# Patient Record
Sex: FEMALE | Race: WHITE
[De-identification: names, ages, dates, MRNs, and addresses within clinical notes are randomized per-mention and may not be internally consistent; named-entity substitution may affect disease eponyms.]

---

## 2017-04-12 ENCOUNTER — HOSPITAL ENCOUNTER (EMERGENCY)
Dept: HOSPITAL 83 - ED | Age: 22
Discharge: HOME | End: 2017-04-12
Payer: COMMERCIAL

## 2017-04-12 VITALS — WEIGHT: 207 LBS | HEIGHT: 65 IN | BODY MASS INDEX: 34.49 KG/M2

## 2017-04-12 DIAGNOSIS — D68.51: ICD-10-CM

## 2017-04-12 DIAGNOSIS — Z3A.30: ICD-10-CM

## 2017-04-12 DIAGNOSIS — O99.113: ICD-10-CM

## 2017-04-12 DIAGNOSIS — O23.43: Primary | ICD-10-CM

## 2017-04-12 LAB
ALBUMIN SERPL-MCNC: NEGATIVE G/DL
APPEARANCE UR: (no result)
BACTERIA #/AREA URNS HPF: (no result) /[HPF]
BILIRUB UR QL STRIP: NEGATIVE
COLOR UR: YELLOW
EPI CELLS #/AREA URNS HPF: (no result) /[HPF]
GLUCOSE UR QL: NEGATIVE
HGB UR QL STRIP: (no result)
KETONES UR QL STRIP: NEGATIVE
LEUKOCYTE ESTERASE UR QL STRIP: (no result)
NITRITE UR QL STRIP: NEGATIVE
PH UR STRIP: 8 [PH] (ref 5–9)
RBC #/AREA URNS HPF: (no result) RBC/HPF (ref 0–2)
SP GR UR: 1.01 (ref 1–1.03)
URINE REFLEX COMMENT: YES
UROBILINOGEN UR STRIP-MCNC: 0.2 E.U./DL (ref 0.2–1)
WBC #/AREA URNS HPF: (no result) WBC/HPF (ref 0–5)

## 2018-11-14 ENCOUNTER — HOSPITAL ENCOUNTER (EMERGENCY)
Dept: HOSPITAL 83 - ED | Age: 23
Discharge: HOME | End: 2018-11-14
Payer: COMMERCIAL

## 2018-11-14 VITALS — HEIGHT: 55 IN | WEIGHT: 220 LBS
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
## 2018-11-14 DIAGNOSIS — S93.401A: Primary | ICD-10-CM
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
## 2021-02-23 ENCOUNTER — ROUTINE PRENATAL (OUTPATIENT)
Dept: OBGYN CLINIC | Age: 26
End: 2021-02-23
Payer: COMMERCIAL

## 2021-02-23 VITALS
BODY MASS INDEX: 39.61 KG/M2 | TEMPERATURE: 97.9 F | DIASTOLIC BLOOD PRESSURE: 86 MMHG | HEART RATE: 101 BPM | SYSTOLIC BLOOD PRESSURE: 127 MMHG | HEIGHT: 64 IN | WEIGHT: 232 LBS

## 2021-02-23 DIAGNOSIS — D68.9 BLOOD COAGULATION DISORDER COMPLICATING PREGNANCY, FIRST TRIMESTER (HCC): ICD-10-CM

## 2021-02-23 DIAGNOSIS — Z3A.12 12 WEEKS GESTATION OF PREGNANCY: ICD-10-CM

## 2021-02-23 DIAGNOSIS — O99.111 BLOOD COAGULATION DISORDER COMPLICATING PREGNANCY, FIRST TRIMESTER (HCC): ICD-10-CM

## 2021-02-23 DIAGNOSIS — Z13.79 ENCOUNTER FOR OTHER SCREENING FOR GENETIC AND CHROMOSOMAL ANOMALIES: ICD-10-CM

## 2021-02-23 DIAGNOSIS — O30.041 TWIN PREGNANCY, DICHORIONIC/DIAMNIOTIC, FIRST TRIMESTER: Primary | ICD-10-CM

## 2021-02-23 DIAGNOSIS — O99.210 OBESITY AFFECTING PREGNANCY, ANTEPARTUM: ICD-10-CM

## 2021-02-23 DIAGNOSIS — O35.2XX0 HEREDITARY FAMILIAL DISEASE AFFECTING MANAGEMENT OF MOTHER AND POSSIBLY AFFECTING FETUS, ANTEPARTUM, SINGLE OR UNSPECIFIED FETUS: ICD-10-CM

## 2021-02-23 LAB
GLUCOSE URINE, POC: NORMAL
PROTEIN UA: NEGATIVE

## 2021-02-23 PROCEDURE — G8427 DOCREV CUR MEDS BY ELIG CLIN: HCPCS | Performed by: OBSTETRICS & GYNECOLOGY

## 2021-02-23 PROCEDURE — 99242 OFF/OP CONSLTJ NEW/EST SF 20: CPT | Performed by: OBSTETRICS & GYNECOLOGY

## 2021-02-23 PROCEDURE — G8419 CALC BMI OUT NRM PARAM NOF/U: HCPCS | Performed by: OBSTETRICS & GYNECOLOGY

## 2021-02-23 PROCEDURE — 99203 OFFICE O/P NEW LOW 30 MIN: CPT | Performed by: OBSTETRICS & GYNECOLOGY

## 2021-02-23 PROCEDURE — 81002 URINALYSIS NONAUTO W/O SCOPE: CPT | Performed by: OBSTETRICS & GYNECOLOGY

## 2021-02-23 PROCEDURE — G8484 FLU IMMUNIZE NO ADMIN: HCPCS | Performed by: OBSTETRICS & GYNECOLOGY

## 2021-02-23 NOTE — LETTER
tremors. No history of seizures    FAMILY MEDICAL HISTORY:   Negative for chromosomal anomalies and Mental retardation. Positive for:  ? Skeletal dysplasia (two of her siblings born at term  at birth). ? Thrombophilia (her mother had a DVT and pulmonary embolus following a  delivery. OB Genetic Screening    Patient's Age 35+ at Date of Delivery No     Thalassemia MCV<80 No     Neural Tube Defect No     Congenital Heart Defect No     Down Syndrome No     Tal-Sachs No     Sickle Cell Disease or Trait No     Hemophilia No     Muscular Dystrophy No     Cystic Fibrosis No     Freeman Chorea No     Mental Retardation/Autism No     Was Person Treated for Fragilex? No     Other Inherited Genetic Chromosomal Disorder? Yes MOB sibling was born with short-limbed dwarfism with bilateral pulmonary hypoplasia and renal medullary dysplasia.  Maternal Metabolic Disorder No     Patient or [de-identified] Father Had Other Defects? No     Recurrent Pregnancy Loss or Still Birth? No        OB Infection History    Blood Type A Postivie      High Risk Hepatitis B/Immunized? Yes     Live with Someone with or Exposed to TB? No     Patient or Partner has Hx of Genital Herpes? No     Rash or Viral Illness Since LMP? No     History of STD/GC/Chlamydia/HPV/Syphilis? No        Mrs Santi Fregoso had an uneventful course of pregnancy so far. When seen today in our office she had no complaints. PHYSICAL EXAMINATION:    General Appearance:  Healthy looking, alert, no acute distress. Eyes:     No pallor, no icterus, no photophobia. Ears:     No ear drainage. Nose:     No nasal drainage, no paranasal sinus tenderness. Throat:   Mucosa moist, no oral thrush, no exudate. Neck:     No nuchal rigidity. Back:     No CVA tenderness. Abdomen:    Soft nontender. Extremities:    No pretibial pitting edema, no calf muscle tenderness. Skin:     No rashes, no lesions.      BP: 127/86 Weight: 232 lb (105.2 kg) Height: 5' 4\" (162.6 cm) Pulse: 101     Body mass index is 39.82 kg/m². Urine dipstick:  Glucose : Negative   Albumin:  Negative       An ultrasound evaluation was done in our office today. Please refer to the enclosed copy of the ultrasound report for further information. Prenatal chart and Lab Work Review:                      IMPRESSION:    1. A 12w2d Dichorionic diamniotic intrauterine twin gestation. 2. Maternal obesity. 3. Family history of skeletal dysplasia (2 siblings delivered at term,  neonatally). 4. Negative Personal History of DVT. 5. 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 The benefits and limitations of the first trimester hormonal screening test along with a nuchal translucency measurement: This test can detect up to 90% of cases of fetal chromosomal anomalies. This means that 10% of the cases are going to be missed by this test.    2. The fact that the 1st trimester test does not screen for birth defects and neural tube defects. An anatomical survey of the baby by ultrasound will be necessary between 16 to 20 weeks to check for birth defects. 3. Screening for neural tube defects will involve maternal serum alpha-fetoprotein to be done in the 2nd trimester or a targeted ultrasound evaluation of the intracranial anatomy and spine. 4. The second trimester hormonal screening test (quad screen)  can detect up to 80% of fetal chromosomal anomalies. It carries however high false positive rate and requires confirmation with a genetic amniocentesis. 5. The fact that only a genetic amniocentesis can detect chromosomal anomalies. It carries, however, a risk of causing fetal loss. ( the risk of loss is quoted to be between 1:200 to 1:500).   6. She declined the genetic amniocentesis. I discussed the cell free DNA test ( [place])  with the patient. I advised her that this a screening test not a diagnostic test.The test screens only for trisomy 21, 18 and 13. She understands that the cell free DNA is  a screening test, it does not replace the diagnostic genetic amniocentesis. She declined the NIPT test today. I told her that if she changes her mind she can call your office or our office to request the test.  7. Twin gestation is likely to be delivered prematurely in 50% of the cases. The placentation is dichorionic diamniotic. There is no risk of fetal fetal transfusion. 8. She was already started on treatment with a thromboprophylaxis of Lovenox in your office. She is to continue the thromboprophylactic treatment and continue the treatment with calcium and vitamin D supplementation. 9. She gives history of having two siblings with skeletal dysplasia. The condition in this situation is genetically inherited (not a de Juanpablo mutation). I discussed with the patient the fact that the condition might be detected antenatally on genetic amniocentesis or on ultrasound done in the second trimester. I asked the patient if she is interested in prenatal genetic testing for the skelettal dysplasia. She declined it. She said that she wouldn't consider having termination of pregnancy, if 1 or both of her babies have skeletal dysplasia, chromosomal abnormalities birth defects mental or motor retardation. 10. Obesity is assosiated with an increased risk of developing gestational diabetes, and disturbance in the growth of her baby, such as Large for dates and small for Dates. Gestational diabetes should be ruled out with a two hour Glucose tolerance test. The test was ordered today. If negative it should be repeated at 26-28 weeks of gestation. 11. She is to continue to follow with you in your office for ongoing obstetric care.   12. I recommend a follow-up

## 2021-02-23 NOTE — PATIENT INSTRUCTIONS
Please arrive for your scheduled appointment at least 15 minutes early with your actual insurance card+ a photo ID. Also if you need any refills ordered or have questions, it may take up 48 hours to reply. Please allow ample time for your refills. Call me when you use last refill. Thank you for your cooperation. Any questions contact Julia at [phone]. If you are experiencing an emergency and need immediate help, call 911 or go to go emergency room or labor and delivery. if you are sick, not feeling well or have an infectious process going on please reschedule your appointment by calling [phone]. Also if any family members are not feeling well, please do not bring them to your appointment. We appreciate your cooperation. We are doing this in order to protect our pregnant mothers+ their babies. Call your primary obstetrician with bleeding, leaking of fluid, abdominal tenderness, headache, blurry vision, epigastric pain and increased urinary frequency. Patient Education        Weeks 10 to 14 of Your Pregnancy: Care Instructions  Your Care Instructions     By weeks 10 to 14 of your pregnancy, the placenta has formed inside your uterus. It is possible to hear your baby's heartbeat with a special ultrasound device. Your baby's eyes can and do move. The arms and legs can bend. This is a good time to think about testing for birth defects. There are two types of tests: screening and diagnostic. Screening tests show the chance that a baby has a certain birth defect. They can't tell you for sure that your baby has a problem. Diagnostic tests show if a baby has a certain birth defect. It's your choice whether to have these tests. You and your partner can talk to your doctor or midwife about birth defects tests. Follow-up care is a key part of your treatment and safety. Be sure to make and go to all appointments, and call your doctor if you are having problems.  It's also a good idea to know your test results and keep a list of the medicines you take. How can you care for yourself at home? Decide about tests  · You can have screening tests and diagnostic tests to check for birth defects. The decision to have a test for birth defects is personal. Think about your age, your chance of passing on a family disease, your need to know about any problems, and what you might do after you have the test results. ? Triple or quadruple (quad) blood tests. These screening tests can be done between 15 and 20 weeks of pregnancy. They check the amounts of three or four substances in your blood. The doctor looks at these test results, along with your age and other factors, to find out the chance that your baby may have certain problems. ? Amniocentesis. This diagnostic test is used to look for chromosomal problems in the baby's cells. It can be done between 15 and 20 weeks of pregnancy, usually around week 16.  ? Nuchal translucency test. This test uses ultrasound to measure the thickness of the area at the back of the baby's neck. An increase in the thickness can be an early sign of Down syndrome. ? Chorionic villus sampling (CVS). This is a test that looks for certain genetic problems with your baby. The same genes that are in your baby are in the placenta. A small piece of the placenta is taken out and tested. This test is done when you are 10 to 13 weeks pregnant. Ease discomfort  · Slow down and take naps when you feel tired. · If your emotions swing, talk to someone. Crying, anxiety, and concentration problems are common. · If your gums bleed, try a softer toothbrush. If your gums are puffy and bleed a lot, see your dentist.  · If you feel dizzy:  ? Get up slowly after sitting or lying down. ? Drink plenty of fluids. ? Eat small snacks to keep your blood sugar stable. ? Put your head between your legs as though you were tying your shoelaces. ? Lie down with your legs higher than your head.  Use pillows to prop up your feet.  · If you have a headache:  ? Lie down. ? Ask your partner or a good friend for a neck massage. ? Try cool cloths over your forehead or across the back of your neck. ? Use acetaminophen (Tylenol) for pain relief. Do not use nonsteroidal anti-inflammatory drugs (NSAIDs), such as ibuprofen (Advil, Motrin) or naproxen (Aleve), unless your doctor says it is okay. · If you have a nosebleed, pinch your nose gently, and hold it for a short while. To prevent nosebleeds, try massaging a small dab of petroleum jelly, such as Vaseline, in your nostrils. · If your nose is stuffed up, try saline (saltwater) nose sprays. Do not use decongestant sprays. Care for your breasts  · Wear a bra that gives you good support. · Know that changes in your breasts are normal.  ? Your breasts may get larger and more tender. Tenderness usually gets better by 12 weeks. ? Your nipples may get darker and larger, and small bumps around your nipples may show more. ? The veins in your chest and breasts may show more. · Don't worry about \"toughening'\" your nipples. Breastfeeding will naturally do this. Where can you learn more? Go to https://OncodesignpeWink.[employer]. org and sign in to your Spritz account. Enter C967 in the [facility] box to learn more about \"Weeks 10 to 14 of Your Pregnancy: Care Instructions. \"     If you do not have an account, please click on the \"Sign Up Now\" link. Current as of: February 11, 2020               Content Version: 12.6  © 8884-5764 [employer]. Care instructions adapted under license by [facility] ([facility]). If you have questions about a medical condition or this instruction, always ask your healthcare professional. [address]  any warranty or liability for your use of this information.          Patient Education        Learning About When to Call Your Doctor During Pregnancy (Up to 20 Weeks)  Your Care Instructions     It's common to have concerns about what might be a problem during pregnancy. Although most pregnant women don't have any serious problems, it's important to know when to call your doctor if you have certain symptoms. These are general suggestions. Your doctor may give you some more information about when to call. When to call your doctor (up to 20 weeks)  Call 911 anytime you think you may need emergency care. For example, call if:  · You passed out (lost consciousness). Call your doctor now or seek immediate medical care if:  · You have a fever. · You have vaginal bleeding. · You are dizzy or lightheaded, or you feel like you may faint. · You have symptoms of a urinary tract infection. These may include:  ? Pain or burning when you urinate. ? A frequent need to urinate without being able to pass much urine. ? Pain in the flank, which is just below the rib cage and above the waist on either side of the back. ? Blood in your urine. · You have belly pain. · You think you are having contractions. · You have a sudden release of fluid from your vagina. Watch closely for changes in your health, and be sure to contact your doctor if:  · You have vaginal discharge that smells bad. · You have other concerns about your pregnancy. Follow-up care is a key part of your treatment and safety. Be sure to make and go to all appointments, and call your doctor if you are having problems. It's also a good idea to know your test results and keep a list of the medicines you take. Where can you learn more? Go to https://ángela.[facility]. org and sign in to your Cloud[employer] account. Enter T213 in the [facility] box to learn more about \"Learning About When to Call Your Doctor During Pregnancy (Up to 20 Weeks). \"     If you do not have an account, please click on the \"Sign Up Now\" link. Current as of: February 11, 2020               Content Version: 12.6  © 9545-1937 [employer].    Care instructions adapted under license by [facility] ([facility]). If you have questions about a medical condition or this instruction, always ask your healthcare professional. [place] [address] any warranty or liability for your use of this information.

## 2021-02-23 NOTE — PROGRESS NOTES
tremors. No history of seizures    FAMILY MEDICAL HISTORY:   Negative for chromosomal anomalies and Mental retardation. Positive for:  ? Skeletal dysplasia (two of her siblings born at term  at birth). ? Thrombophilia (her mother had a DVT and pulmonary embolus following a  delivery. OB Genetic Screening    Patient's Age 35+ at Date of Delivery No     Thalassemia MCV<80 No     Neural Tube Defect No     Congenital Heart Defect No     Down Syndrome No     Tal-Sachs No     Sickle Cell Disease or Trait No     Hemophilia No     Muscular Dystrophy No     Cystic Fibrosis No     Thornton Chorea No     Mental Retardation/Autism No     Was Person Treated for Fragilex? No     Other Inherited Genetic Chromosomal Disorder? Yes MOB sibling was born with short-limbed dwarfism with bilateral pulmonary hypoplasia and renal medullary dysplasia.  Maternal Metabolic Disorder No     Patient or [de-identified] Father Had Other Defects? No     Recurrent Pregnancy Loss or Still Birth? No        OB Infection History    Blood Type A Postivie      High Risk Hepatitis B/Immunized? Yes     Live with Someone with or Exposed to TB? No     Patient or Partner has Hx of Genital Herpes? No     Rash or Viral Illness Since LMP? No     History of STD/GC/Chlamydia/HPV/Syphilis? No        Mrs Santa Cardoso had an uneventful course of pregnancy so far. When seen today in our office she had no complaints. PHYSICAL EXAMINATION:    General Appearance:  Healthy looking, alert, no acute distress. Eyes:     No pallor, no icterus, no photophobia. Ears:     No ear drainage. Nose:     No nasal drainage, no paranasal sinus tenderness. Throat:   Mucosa moist, no oral thrush, no exudate. Neck:     No nuchal rigidity. Back:     No CVA tenderness. Abdomen:    Soft nontender. Extremities:    No pretibial pitting edema, no calf muscle tenderness. Skin:     No rashes, no lesions.      BP: 127/86 Weight: 232 lb (105.2 kg) Height: 5' 4\" (162.6 cm) Pulse: 101     Body mass index is 39.82 kg/m². Urine dipstick:  Glucose : Negative   Albumin:  Negative       An ultrasound evaluation was done in our office today. Please refer to the enclosed copy of the ultrasound report for further information. Prenatal chart and Lab Work Review:                      IMPRESSION:    1. A 12w2d Dichorionic diamniotic intrauterine twin gestation. 2. Maternal obesity. 3. Family history of skeletal dysplasia (2 siblings delivered at term,  neonatally). 4. Negative Personal History of DVT. 5. 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 The benefits and limitations of the first trimester hormonal screening test along with a nuchal translucency measurement: This test can detect up to 90% of cases of fetal chromosomal anomalies. This means that 10% of the cases are going to be missed by this test.    2. The fact that the 1st trimester test does not screen for birth defects and neural tube defects. An anatomical survey of the baby by ultrasound will be necessary between 16 to 20 weeks to check for birth defects. 3. Screening for neural tube defects will involve maternal serum alpha-fetoprotein to be done in the 2nd trimester or a targeted ultrasound evaluation of the intracranial anatomy and spine. 4. The second trimester hormonal screening test (quad screen)  can detect up to 80% of fetal chromosomal anomalies. It carries however high false positive rate and requires confirmation with a genetic amniocentesis. 5. The fact that only a genetic amniocentesis can detect chromosomal anomalies. It carries, however, a risk of causing fetal loss.   ( the risk of loss is quoted to be between 1:200 to follow-up ultrasound evaluation in 4 weeks to check on cervical length and on fetal wellbeing and growth. Thank you again, doctor, for allowing us to be of service to your patient. If I can be of further assistance, please do not hesitate to call. Sincerely,        Lorri Yip M.D., [address] [facility]  Time spent on the encounter was over 30 minutes including counseling  coordination of care and direct face-to-face contact with the patient. Current encounter billing:  AR OFFICE CONSULTATION NEW/ESTAB PATIENT 30 MIN [[id number]]  US Fetal Nuchal Translucency [NRB7703 CPT(R)] x2 twins    **This report has been created using voice recognition software.  It may contain minor errors     which are inherent in voice recognition technology**

## 2021-03-03 ENCOUNTER — TELEPHONE (OUTPATIENT)
Dept: OBGYN CLINIC | Age: 26
End: 2021-03-03

## 2021-03-04 ENCOUNTER — TELEPHONE (OUTPATIENT)
Dept: OBGYN CLINIC | Age: 26
End: 2021-03-04

## 2021-03-30 ENCOUNTER — ANCILLARY PROCEDURE (OUTPATIENT)
Dept: OBGYN CLINIC | Age: 26
End: 2021-03-30
Payer: COMMERCIAL

## 2021-03-30 ENCOUNTER — ROUTINE PRENATAL (OUTPATIENT)
Dept: OBGYN CLINIC | Age: 26
End: 2021-03-30
Payer: COMMERCIAL

## 2021-03-30 VITALS
HEIGHT: 64 IN | DIASTOLIC BLOOD PRESSURE: 80 MMHG | SYSTOLIC BLOOD PRESSURE: 112 MMHG | WEIGHT: 232.8 LBS | TEMPERATURE: 97.5 F | HEART RATE: 88 BPM | BODY MASS INDEX: 39.75 KG/M2

## 2021-03-30 DIAGNOSIS — Z36.89 ENCOUNTER FOR FETAL ANATOMIC SURVEY: ICD-10-CM

## 2021-03-30 DIAGNOSIS — D68.9 BLOOD COAGULATION DISORDER COMPLICATING PREGNANCY, SECOND TRIMESTER (HCC): ICD-10-CM

## 2021-03-30 DIAGNOSIS — O30.042 DICHORIONIC DIAMNIOTIC TWIN PREGNANCY IN SECOND TRIMESTER: Primary | ICD-10-CM

## 2021-03-30 DIAGNOSIS — O99.212 MATERNAL OBESITY, ANTEPARTUM, SECOND TRIMESTER: ICD-10-CM

## 2021-03-30 DIAGNOSIS — Z79.01 LONG TERM CURRENT USE OF ANTICOAGULANT THERAPY: ICD-10-CM

## 2021-03-30 DIAGNOSIS — O35.2XX0 HEREDITARY FAMILIAL DISEASE AFFECTING MANAGEMENT OF MOTHER AND POSSIBLY AFFECTING FETUS, ANTEPARTUM, SINGLE OR UNSPECIFIED FETUS: ICD-10-CM

## 2021-03-30 DIAGNOSIS — Z13.79 ENCOUNTER FOR OTHER SCREENING FOR GENETIC AND CHROMOSOMAL ANOMALIES: ICD-10-CM

## 2021-03-30 DIAGNOSIS — Z3A.17 17 WEEKS GESTATION OF PREGNANCY: ICD-10-CM

## 2021-03-30 DIAGNOSIS — O99.112 BLOOD COAGULATION DISORDER COMPLICATING PREGNANCY, SECOND TRIMESTER (HCC): ICD-10-CM

## 2021-03-30 DIAGNOSIS — Z03.75 SUSPECTED SHORTENING OF CERVIX NOT FOUND: ICD-10-CM

## 2021-03-30 LAB
GLUCOSE URINE, POC: NEGATIVE
PROTEIN UA: NEGATIVE

## 2021-03-30 PROCEDURE — G8484 FLU IMMUNIZE NO ADMIN: HCPCS | Performed by: OBSTETRICS & GYNECOLOGY

## 2021-03-30 PROCEDURE — G8419 CALC BMI OUT NRM PARAM NOF/U: HCPCS | Performed by: OBSTETRICS & GYNECOLOGY

## 2021-03-30 PROCEDURE — 81002 URINALYSIS NONAUTO W/O SCOPE: CPT | Performed by: OBSTETRICS & GYNECOLOGY

## 2021-03-30 PROCEDURE — 76817 TRANSVAGINAL US OBSTETRIC: CPT | Performed by: OBSTETRICS & GYNECOLOGY

## 2021-03-30 PROCEDURE — 76811 OB US DETAILED SNGL FETUS: CPT | Performed by: OBSTETRICS & GYNECOLOGY

## 2021-03-30 PROCEDURE — 1036F TOBACCO NON-USER: CPT | Performed by: OBSTETRICS & GYNECOLOGY

## 2021-03-30 PROCEDURE — 99213 OFFICE O/P EST LOW 20 MIN: CPT | Performed by: OBSTETRICS & GYNECOLOGY

## 2021-03-30 PROCEDURE — G8427 DOCREV CUR MEDS BY ELIG CLIN: HCPCS | Performed by: OBSTETRICS & GYNECOLOGY

## 2021-03-30 PROCEDURE — 76812 OB US DETAILED ADDL FETUS: CPT | Performed by: OBSTETRICS & GYNECOLOGY

## 2021-03-30 RX ORDER — ASPIRIN 81 MG/1
81 TABLET, CHEWABLE ORAL DAILY
Qty: 30 TABLET | Refills: 3 | Status: SHIPPED | OUTPATIENT
Start: 2021-03-30

## 2021-03-30 NOTE — PROGRESS NOTES
3/30/21     RE:  Kayce Tate   : 1995   AGE: 31 y.o. REFERRING PHYSICIAN:                  Jimmy Baumann MD    Dear     Mrs. Kayce Tate a 31 y.o.  Silvia Khan  is seen today on follow up in our office. REASON FOR APPOINTMENT:  · Follow-up on a pregnant patient with dichorionic diamniotic twin gestation and thrombophilia. MEDICATIONS:    Prenatal Vitamins once per day   Lovenox 40 mg subcutaneous once per day. Vitamin D and calcium supplement (over-the-counter medication, does not know the dosage or name). INTERVAL HISTORY:  Mrs Kayce Tate had an uneventful course of pregnancy since her last visit to our office. When seen today in our office she had no complaints. PHYSICAL EXAMINATION:  General Appearance:  Healthy looking, alert, no acute distress. Eyes:     No pallor, no icterus, no photophobia. Ears:     No ear drainage. Nose:     No nasal drainage, no paranasal sinus tenderness. Throat:   Mucosa moist, no oral thrush, no exudate. Neck:     No nuchal rigidity. Back:     No CVA tenderness. Abdomen:    Soft nontender. Extremities:    No pretibial pitting edema, no calf muscle tenderness. Skin:     No rashes, no lesions. BP: 112/80 Weight: 232 lb 12.8 oz (105.6 kg) Height: 5' 4\" (162.6 cm) Pulse: 88     Body mass index is 39.96 kg/m². Urine dipstick:  Glucose : Negative   Albumin:  Negative       An ultrasound evaluation was done in our office today. Please refer to the enclosed copy of the ultrasound report for further information. Chart and Lab Work Review:  I reviewed with the patient the result of the:  · Two hour GTT collected on 3/10/2021 that ruled out gestational diabetes. IMPRESSION:  1. A  17w2d Dichorionic diamniotic intrauterine twin gestation. 2. Maternal obesity. 3. Family history of skeletal dysplasia (2 siblings delivered at term,  neonatally). 4. Negative Personal History of DVT.   5. Tested because of family history of DVT and transfusion. Twin gestation is associated with an increased risk of:  · Premature delivery. (Cervical length today was reassuring against risk of  delivery.)  · Disturbance in the growth of her babies (size is appropriate for gestational age on each baby and concordant growth is noted) . Premature delivery. (Cervical length today was reassuring against risk of  delivery.)  · Disturbance in the growth of her babies (size is appropriate for gestational age on each baby and concordant growth is noted). 7. Obesity is assosiated with an increased risk of developing gestational diabetes, and disturbance in the growth of her baby, such as Large for dates and small for Dates. Gestational diabetes should was ruled out with a negative two hour Glucose tolerance test, collected in your office on 3/10/2021. The test should be repeated at 26 to 28 weeks of gestation. 8. She is to continue the thromboprophylactic treatment and continue the treatment with calcium and vitamin D supplementation. 9. I also recommend initiation of treatment with baby aspirin 81 mg once per day to delay onset and decrease likelihood of developing preeclampsia. 10. She gives history of having two siblings with skeletal dysplasia. The condition in this situation is genetically inherited (not a de Juanpablo mutation). I discussed with the patient the fact that the condition might be detected antenatally on genetic amniocentesis or on ultrasound done in the second trimester. I asked the patient if she is interested in prenatal genetic testing for the skelettal dysplasia. She declined it. She said that she wouldn't consider having termination of pregnancy, if 1 or both of her babies have skeletal dysplasia, chromosomal abnormalities birth defects mental or motor retardation. 11. She is to continue to follow with you in your office for ongoing obstetric care.   12. I recommend a follow-up ultrasound evaluation in 3 weeks to check on cervical length and on fetal wellbeing and growth. Thank you again, doctor, for allowing us to be of service to your patient. If I can be of further assistance, please do not hesitate to call. Sincerely,        Nikki Melgar M.D., [address] [facility]    Time spent on the encounter was over 25 minutes including counseling  coordination of care and direct face-to-face contact with the patient. Current encounter billing:  [place] OFFICE OUTPATIENT VISIT LOW MDM 20-30 MINUTES [[id number]]  US OB Detail Fetal Anatomy Single or 1st [[id number] Custom]  US OB DETAIL FETAL ANATOMY EACH ADDITIONAL GESTATION   US OB Transvaginal [id number] Custom]    **This report has been created using voice recognition software.  It may contain minor errors     which are inherent in voice recognition technology**

## 2021-03-30 NOTE — PATIENT INSTRUCTIONS
Call your primary obstetrician with bleeding, leaking of fluid, abdominal tenderness, headache, blurry vision, epigastric pain and increased urinary frequency. Any questions contact Julia at [phone]. If you are experiencing an emergency and need immediate help, call 911 or go to go emergency room or labor and delivery. Please arrive for your scheduled appointment at least 15 minutes early with your actual insurance card+ a photo ID. Also if you need any refills ordered or have questions, it may take up 48 hours to reply. Please allow ample time for your refills. Call me when you use last refill. Thank you for your cooperation. if you are sick, not feeling well or have an infectious process going on please reschedule your appointment by calling [phone]. Also if any family members are not feeling well, please do not bring them to your appointment. We appreciate your cooperation. We are doing this in order to protect our pregnant mothers+ their babies. Patient Education        Weeks 14 to 25 of Your Pregnancy: Care Instructions  Your Care Instructions     During this time, you may start to \"show,\" so that you look pregnant to people around you. You may also notice some changes in your skin, such as itchy spots on your palms or acne on your face. Your baby is now able to pass urine, and your baby's first stool (meconium) is starting to collect in his or her intestines. Hair is also beginning to grow on your baby's head. At your next visit, between weeks 18 and 20, your doctor may do an ultrasound test. The test allows your doctor to check for certain problems. Your doctor can also tell the sex of your baby. This is a good time to think about whether you want to know whether your baby is a boy or a girl. Talk to your doctor about getting a flu shot to help keep you healthy during your pregnancy. As your pregnancy moves along, it is common to worry or feel anxious. Your body is changing a lot.  And you are thinking about giving birth, the health of your baby, and becoming a parent. You can learn to cope with any anxiety and stress you feel. Follow-up care is a key part of your treatment and safety. Be sure to make and go to all appointments, and call your doctor if you are having problems. It's also a good idea to know your test results and keep a list of the medicines you take. How can you care for yourself at home? Reduce stress    · Ask for help with cooking and housekeeping.     · Figure out who or what causes your stress. Avoid these people or situations as much as possible.     · Relax every day. Taking 10- to 15-minute breaks can make a big difference. Take a walk, listen to music, or take a warm bath.     · Learn relaxation techniques at prenatal or yoga class. Or buy a relaxation tape.     · List your fears about having a baby and becoming a parent. Share the list with someone you trust. Decide which worries are really small, and try to let them go. Exercise    · If you did not exercise much before pregnancy, start slowly. Walking is best. [employer], and do a little more every day.     · Brisk walking, easy jogging, low-impact aerobics, water aerobics, and yoga are good choices. Some sports, such as scuba diving, horseback riding, downhill skiing, gymnastics, and water skiing, are not a good idea.     · Try to do at least 2½ hours a week of moderate exercise, such as a fast walk. One way to do this is to be active 30 minutes a day, at least 5 days a week.     · Wear loose clothing. And wear shoes and a bra that provide good support.     · Warm up and cool down to start and finish your exercise.     · If you want to use weights, be sure to use light weights. They reduce stress on your joints.    Stay at the best weight for you    · Experts recommend that you gain about 1 pound a month during the first 3 months of your pregnancy.     · Experts recommend that you gain about 1 pound a week during your last 6 months of pregnancy, for a total weight gain of 25 to 35 pounds.     · If you are underweight, you will need to gain more weight (about 28 to 40 pounds).     · If you are overweight, you may not need to gain as much weight (about 15 to 25 pounds).     · If you are gaining weight too fast, use common sense. Exercise every day, and limit sweets, fast foods, and fats. Choose lean meats, fruits, and vegetables.     · If you are having twins or more, your doctor may refer you to a dietitian. Where can you learn more? Go to https://[email or web address] and sign in to your [employer] account. Enter M693 in the WebRadar box to learn more about \"Weeks 14 to 18 of Your Pregnancy: Care Instructions. \"     If you do not have an account, please click on the \"Sign Up Now\" link. Current as of: October 8, 2020               Content Version: 12.8  © 2006-2021 "[employer]". Care instructions adapted under license by [facility] ([facility]). If you have questions about a medical condition or this instruction, always ask your healthcare professional. [place] [address] any warranty or liability for your use of this information. Patient Education        Learning About When to Call Your Doctor During Pregnancy (Up to 20 Weeks)  Your Care Instructions     It's common to have concerns about what might be a problem during pregnancy. Although most pregnant women don't have any serious problems, it's important to know when to call your doctor if you have certain symptoms. These are general suggestions. Your doctor may give you some more information about when to call. When to call your doctor (up to 20 weeks)  Call 911 anytime you think you may need emergency care. For example, call if:  · You passed out (lost consciousness). Call your doctor now or seek immediate medical care if:  · You have a fever. · You have vaginal bleeding.   · You are dizzy or lightheaded, or you feel like you may faint. · You have symptoms of a urinary tract infection. These may include:  ? Pain or burning when you urinate. ? A frequent need to urinate without being able to pass much urine. ? Pain in the flank, which is just below the rib cage and above the waist on either side of the back. ? Blood in your urine. · You have belly pain. · You think you are having contractions. · You have a sudden release of fluid from your vagina. Watch closely for changes in your health, and be sure to contact your doctor if:  · You have vaginal discharge that smells bad. · You have other concerns about your pregnancy. Follow-up care is a key part of your treatment and safety. Be sure to make and go to all appointments, and call your doctor if you are having problems. It's also a good idea to know your test results and keep a list of the medicines you take. Where can you learn more? Go to [email or web address]://Vantia TherapeuticspeWorkstir.[email or web address] org and sign in to your [employer] medical account. Enter R772 in the Muchasa box to learn more about \"Learning About When to Call Your Doctor During Pregnancy (Up to 20 Weeks). \"     If you do not have an account, please click on the \"Sign Up Now\" link. Current as of: October 8, 2020               Content Version: 12.8  © 2006-2021 Healthwise, Incorporated. Care instructions adapted under license by [facility] ([facility]). If you have questions about a medical condition or this instruction, always ask your healthcare professional. [place] [address] any warranty or liability for your use of this information.

## 2021-03-30 NOTE — LETTER
3/30/21     RE:  Santi Fregoso   : 1995   AGE: 31 y.o. REFERRING PHYSICIAN:                  Darren Kamara MD    Dear     Mrs. Santi Fregoso a 31 y.o.  Hernandez Shivn  is seen today on follow up in our office. REASON FOR APPOINTMENT:  · Follow-up on a pregnant patient with dichorionic diamniotic twin gestation and thrombophilia. MEDICATIONS:    Prenatal Vitamins once per day   Lovenox 40 mg subcutaneous once per day. Vitamin D and calcium supplement (over-the-counter medication, does not know the dosage or name). INTERVAL HISTORY:  Mrs Santi Fregoso had an uneventful course of pregnancy since her last visit to our office. When seen today in our office she had no complaints. PHYSICAL EXAMINATION:  General Appearance:  Healthy looking, alert, no acute distress. Eyes:     No pallor, no icterus, no photophobia. Ears:     No ear drainage. Nose:     No nasal drainage, no paranasal sinus tenderness. Throat:   Mucosa moist, no oral thrush, no exudate. Neck:     No nuchal rigidity. Back:     No CVA tenderness. Abdomen:    Soft nontender. Extremities:    No pretibial pitting edema, no calf muscle tenderness. Skin:     No rashes, no lesions. BP: 112/80 Weight: 232 lb 12.8 oz (105.6 kg) Height: 5' 4\" (162.6 cm) Pulse: 88     Body mass index is 39.96 kg/m². Urine dipstick:  Glucose : Negative   Albumin:  Negative       An ultrasound evaluation was done in our office today. Please refer to the enclosed copy of the ultrasound report for further information. Chart and Lab Work Review:  I reviewed with the patient the result of the:  · Two hour GTT collected on 3/10/2021 that ruled out gestational diabetes. IMPRESSION:  1. A  17w2d Dichorionic diamniotic intrauterine twin gestation. 2. Maternal obesity. 3. Family history of skeletal dysplasia (2 siblings delivered at term,  neonatally). 4. Negative Personal History of DVT.   5. 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 The benefits and limitations of ultrasound in prenatal diagnosis and the fact that some defects might not always be seen by ultrasound. Estimated incidence of these defects in the general population is 2- 4%. 2. The size of each baby is appropriate for gestational age, adequate concordant growth is noted. No anomalies are noted. 3. Only genetic amniocentesis can rule out fetal chromosomal anomalies, normal ultrasound does not. Based on her age and the absence of chromosomal markers by ultrasound today, the risk of chromosomal anomalies is small and does not indicate a need for an amniocentesis, a procedure that might cause pregnancy loss. ( the risk of loss is quoted to be between 1:200 to 1:500). 4. An amniocentesis is indicated if fetal structural defects are seen or if the first Trimester or second trimester hormonal screening test (quad screen) show an increase risk for Chromosomal anomalies. 5. On her last visit to our office she declined prenatal genetic testing. She changed her mind and asked for the cell free DNA test today. It was ordered. · I reviewed with her the fact that NIPT is a screening test not a diagnostic test. It does not replace the diagnostic genetic amniocentesis. It screens only for trisomy 21, 18 and 13. · Also reviewed with her the limitations of the test in twin gestation. Abnormal result might indicate chromosome abnormality in one baby or in both. Genetic amniocentesis will be indicated to check on chromosome make-up of each baby. Finding of Y chromosome also might indicate that both babies are of the male sex or only one of them is an male. 6. She is carrying dichorionic diamniotic twin gestation.  There is no risk of fetal fetal transfusion. Twin gestation is associated with an increased risk of:  · Premature delivery. (Cervical length today was reassuring against risk of  delivery.)  · Disturbance in the growth of her babies (size is appropriate for gestational age on each baby and concordant growth is noted) . Premature delivery. (Cervical length today was reassuring against risk of  delivery.)  · Disturbance in the growth of her babies (size is appropriate for gestational age on each baby and concordant growth is noted). 7. Obesity is assosiated with an increased risk of developing gestational diabetes, and disturbance in the growth of her baby, such as Large for dates and small for Dates. Gestational diabetes should was ruled out with a negative two hour Glucose tolerance test, collected in your office on 3/10/2021. The test should be repeated at 26 to 28 weeks of gestation. 8. She is to continue the thromboprophylactic treatment and continue the treatment with calcium and vitamin D supplementation. 9. I also recommend initiation of treatment with baby aspirin 81 mg once per day to delay onset and decrease likelihood of developing preeclampsia. 10. She gives history of having two siblings with skeletal dysplasia. The condition in this situation is genetically inherited (not a de Juanpablo mutation). I discussed with the patient the fact that the condition might be detected antenatally on genetic amniocentesis or on ultrasound done in the second trimester. I asked the patient if she is interested in prenatal genetic testing for the skelettal dysplasia. She declined it. She said that she wouldn't consider having termination of pregnancy, if 1 or both of her babies have skeletal dysplasia, chromosomal abnormalities birth defects mental or motor retardation. 11. She is to continue to follow with you in your office for ongoing obstetric care.   12. I recommend a follow-up ultrasound evaluation in 3 weeks to check on cervical length and on fetal wellbeing and growth. Thank you again, doctor, for allowing us to be of service to your patient. If I can be of further assistance, please do not hesitate to call. Sincerely,        Nadine Dawson M.D., [facility]    Time spent on the encounter was over 25 minutes including counseling  coordination of care and direct face-to-face contact with the patient. Current encounter billing:  NE OFFICE OUTPATIENT VISIT LOW MDM 20-30 MINUTES [[id number]]  US OB Detail Fetal Anatomy Single or 1st [[id number] Custom]  US OB DETAIL FETAL ANATOMY EACH ADDITIONAL GESTATION   US OB Transvaginal [id number] Custom]    **This report has been created using voice recognition software.  It may contain minor errors     which are inherent in voice recognition technology**

## 2021-04-02 ENCOUNTER — TELEPHONE (OUTPATIENT)
Dept: OBGYN CLINIC | Age: 26
End: 2021-04-02

## 2021-04-02 NOTE — TELEPHONE ENCOUNTER
Gissell called regarding the harmony genetic testing that was drawn on patient. She stated that the testing that was ordered for her could not be done due to her twin pregnancy. The only test that can be run on twin pregnancy is:  [place] + fetal sex     Ordered was:  [place] + fetal sex + sex chromosome aneuploidy panel.     I will consult Dr. Kenyetta Hilliard on 04/05/20021 for his recommendations

## 2021-04-07 ENCOUNTER — TELEPHONE (OUTPATIENT)
Dept: OBGYN CLINIC | Age: 26
End: 2021-04-07

## 2021-04-07 NOTE — TELEPHONE ENCOUNTER
Patient's date of birth confirmed, Wayan results; trisomy 21,18 and 13 low probability(normal), fetal sex female

## 2021-04-21 ENCOUNTER — ROUTINE PRENATAL (OUTPATIENT)
Dept: OBGYN CLINIC | Age: 26
End: 2021-04-21
Payer: COMMERCIAL

## 2021-04-21 ENCOUNTER — ANCILLARY PROCEDURE (OUTPATIENT)
Dept: OBGYN CLINIC | Age: 26
End: 2021-04-21
Payer: COMMERCIAL

## 2021-04-21 VITALS
HEIGHT: 64 IN | WEIGHT: 235.8 LBS | HEART RATE: 95 BPM | SYSTOLIC BLOOD PRESSURE: 109 MMHG | DIASTOLIC BLOOD PRESSURE: 76 MMHG | BODY MASS INDEX: 40.26 KG/M2

## 2021-04-21 DIAGNOSIS — Z3A.20 20 WEEKS GESTATION OF PREGNANCY: ICD-10-CM

## 2021-04-21 DIAGNOSIS — O35.2XX0 HEREDITARY FAMILIAL DISEASE AFFECTING MANAGEMENT OF MOTHER AND POSSIBLY AFFECTING FETUS, ANTEPARTUM, SINGLE OR UNSPECIFIED FETUS: ICD-10-CM

## 2021-04-21 DIAGNOSIS — O30.042 DICHORIONIC DIAMNIOTIC TWIN PREGNANCY IN SECOND TRIMESTER: Primary | ICD-10-CM

## 2021-04-21 DIAGNOSIS — Z79.01 LONG TERM CURRENT USE OF ANTICOAGULANT THERAPY: ICD-10-CM

## 2021-04-21 DIAGNOSIS — D68.9 BLOOD COAGULATION DISORDER COMPLICATING PREGNANCY, SECOND TRIMESTER (HCC): ICD-10-CM

## 2021-04-21 DIAGNOSIS — O99.112 BLOOD COAGULATION DISORDER COMPLICATING PREGNANCY, SECOND TRIMESTER (HCC): ICD-10-CM

## 2021-04-21 DIAGNOSIS — Z03.75 SUSPECTED SHORTENING OF CERVIX NOT FOUND: ICD-10-CM

## 2021-04-21 DIAGNOSIS — O99.212 MATERNAL OBESITY, ANTEPARTUM, SECOND TRIMESTER: ICD-10-CM

## 2021-04-21 LAB
GLUCOSE URINE, POC: NEGATIVE
PROTEIN UA: NEGATIVE

## 2021-04-21 PROCEDURE — G8427 DOCREV CUR MEDS BY ELIG CLIN: HCPCS | Performed by: OBSTETRICS & GYNECOLOGY

## 2021-04-21 PROCEDURE — 99212 OFFICE O/P EST SF 10 MIN: CPT | Performed by: OBSTETRICS & GYNECOLOGY

## 2021-04-21 PROCEDURE — 81002 URINALYSIS NONAUTO W/O SCOPE: CPT | Performed by: OBSTETRICS & GYNECOLOGY

## 2021-04-21 PROCEDURE — 99213 OFFICE O/P EST LOW 20 MIN: CPT | Performed by: OBSTETRICS & GYNECOLOGY

## 2021-04-21 PROCEDURE — 76816 OB US FOLLOW-UP PER FETUS: CPT | Performed by: OBSTETRICS & GYNECOLOGY

## 2021-04-21 PROCEDURE — G8419 CALC BMI OUT NRM PARAM NOF/U: HCPCS | Performed by: OBSTETRICS & GYNECOLOGY

## 2021-04-21 PROCEDURE — 1036F TOBACCO NON-USER: CPT | Performed by: OBSTETRICS & GYNECOLOGY

## 2021-04-21 PROCEDURE — 76817 TRANSVAGINAL US OBSTETRIC: CPT | Performed by: OBSTETRICS & GYNECOLOGY

## 2021-04-21 NOTE — PROGRESS NOTES
21     RE:  Mart Arreguin   : 1995   AGE: 31 y.o. REFERRING PHYSICIAN:                  Cheryle Hamper MD    Dear     Mrs. Mart Arreguin a 31 y.o.  Sara Winkler  is seen today on follow up in our office. REASON FOR APPOINTMENT:  · Follow-up on a pregnant patient with dichorionic diamniotic twin gestation and thrombophilia. MEDICATIONS:    Prenatal Vitamins once per day   Lovenox 40 mg subcutaneous once per day. Vitamin D and calcium supplement. INTERVAL HISTORY:  Mrs Mart Arreguin had an uneventful course of pregnancy since her last visit to our office. When seen today in our office she had no complaints. PHYSICAL EXAMINATION:  General Appearance:  Healthy looking, alert, no acute distress. Eyes:     No pallor, no icterus, no photophobia. Ears:     No ear drainage. Nose:     No nasal drainage, no paranasal sinus tenderness. Throat:   Mucosa moist, no oral thrush, no exudate. Neck:     No nuchal rigidity. Back:     No CVA tenderness. Abdomen:    Soft nontender. Extremities:    No pretibial pitting edema, no calf muscle tenderness. Skin:     No rashes, no lesions. BP: 109/76 Weight: 235 lb 12.8 oz (107 kg) Height: 5' 4\" (162.6 cm) Pulse: 95     Body mass index is 40.47 kg/m². Urine dipstick:  Glucose : Negative   Albumin:  Negative       An ultrasound evaluation was done in our office today. Please refer to the enclosed copy of the ultrasound report for further information. Chart and Lab Work Review:  I reviewed with the patient the result of the:  · Cell free DNA test collected on 3/30/2021 that showed low probability of having baby with trisomy 24, 25 or 13. IMPRESSION:  1. A  20w3d  Dichorionic diamniotic intrauterine twin gestation. 2. Maternal obesity. 3. Family history of skeletal dysplasia (2 siblings delivered at term,  neonatally). 4. Negative Personal History of DVT.   5. 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 The benefits and limitations of ultrasound in prenatal diagnosis and the fact that some defects might not always be seen by ultrasound. Estimated incidence of these defects in the general population is 2- 4%. 2. The size of each baby is appropriate for gestational age, adequate concordant growth is noted. No anomalies are noted. 3. Only genetic amniocentesis can rule out fetal chromosomal anomalies, normal ultrasound does not. Based on her age and the absence of chromosomal markers by ultrasound today, the risk of chromosomal anomalies is small and does not indicate a need for an amniocentesis, a procedure that might cause pregnancy loss. ( the risk of loss is quoted to be between 1:200 to 1:500). 4. An amniocentesis is indicated if fetal structural defects are seen or if the first Trimester or second trimester hormonal screening test (quad screen) show an increase risk for Chromosomal anomalies. 5.  She was reassured by the result of the cell free DNA test (NIPT). I explained to her that this is not a diagnostic test, it can miss some chromosomal anomalies. .  6. She is carrying dichorionic diamniotic twin gestation. There is no risk of fetal fetal transfusion. Twin gestation is associated with an increased risk of:  · Premature delivery. (Cervical length today was reassuring against risk of  delivery.)  · Disturbance in the growth of her babies (size is appropriate for gestational age on each baby and concordant growth is noted)   7. Obesity is assosiated with an increased risk of developing gestational diabetes, and disturbance in the growth of her baby, such as Large for dates and small for Dates.   Gestational diabetes was ruled out with a negative two hour Glucose tolerance test, collected in your office on 3/10/2021. The test should be repeated at 26 to 28 weeks of gestation. 8. She is to continue the thromboprophylactic treatment and continue the treatment with calcium and vitamin D supplementation. 9. I also recommend initiation of treatment with baby aspirin 81 mg once per day to delay onset and decrease likelihood of developing preeclampsia. 10. She gives history of having two siblings with skeletal dysplasia. The condition in this situation is genetically inherited (not a de Juanpablo mutation). I discussed with the patient the fact that the condition might be detected antenatally on genetic amniocentesis or on ultrasound done in the second trimester. I asked the patient if she is interested in prenatal genetic testing for the skelettal dysplasia. She declined it. She said that she wouldn't consider having termination of pregnancy, if 1 or both of her babies have skeletal dysplasia, chromosomal abnormalities birth defects mental or motor retardation. 11. She is to continue to follow with you in your office for ongoing obstetric care. 12. I recommend a follow-up ultrasound evaluation in 3 weeks to check on cervical length and on fetal wellbeing and growth. Thank you again, doctor, for allowing us to be of service to your patient. If I can be of further assistance, please do not hesitate to call. Sincerely,        Maury Elkins M.D., [facility] [facility]    Time spent on the encounter was over 15 minutes including counseling  coordination of care and direct face-to-face contact with the patient. Current encounter billing:  [place] OFFICE OUTPATIENT VISIT 10-19 MINUTES [[id number]]  US OB Follow Up Transabdominal Approach [[id number] Custom] x2 twins  US OB Transvaginal [[id number] Custom]    **This report has been created using voice recognition software.  It may contain minor errors     which are inherent in voice recognition technology**

## 2021-04-21 NOTE — PATIENT INSTRUCTIONS
Patient Education        Weeks 18 to 22 of Your Pregnancy: Care Instructions  Overview     Your baby is continuing to develop quickly. Sometime between 18 and 22 weeks, you'll probably start to feel your baby move. At first, these small fetal movements feel like fluttering or \"butterflies. \" Some women say that they feel like gas bubbles. As your baby grows, these movements will become stronger. You may also notice that your baby hiccups. Babies at this stage can now suck their thumbs. You may find that your nausea and fatigue are gone. You may feel better overall and have more energy than you did in your first trimester. But you might now also have some new discomforts, like sleep problems or leg cramps. Talk to your doctor about things you can do at home to ease these problems. Follow-up care is a key part of your treatment and safety. Be sure to make and go to all appointments, and call your doctor if you are having problems. It's also a good idea to know your test results and keep a list of the medicines you take. How can you care for yourself at home? Ease sleep problems  · Avoid caffeine in drinks or chocolate late in the day. · Get some exercise every day. · Take a warm shower or bath before bed. · Have a light snack or glass of milk at bedtime. · Do relaxation exercises in bed to calm your mind and body. · Support your legs and back with extra pillows. Try a pillow between your legs if you sleep on your side. · Do not use sleeping pills or alcohol. They could harm your baby. Ease leg cramps  · Do not massage your calf during the cramp. · Sit on a firm bed or chair. Straighten your leg, and bend your foot (flex your ankle) slowly upward, toward your knee. Bend your toes up and down. · Stand on a cool, flat surface. Stretch your toes upward, and take small steps walking on your heels. · Use a heating pad or hot water bottle to help with muscle ache.   Prevent leg cramps  · Be sure to get enough back.  ? Blood in your urine. · You have belly pain. · You think you are having contractions. · You have a sudden release of fluid from your vagina. Watch closely for changes in your health, and be sure to contact your doctor if:  · You have vaginal discharge that smells bad. · You have other concerns about your pregnancy. Follow-up care is a key part of your treatment and safety. Be sure to make and go to all appointments, and call your doctor if you are having problems. It's also a good idea to know your test results and keep a list of the medicines you take. Where can you learn more? Go to https://[email or web address].[email or web address] org and sign in to your [employer] account. Enter U867 in the [employer] Entertainment box to learn more about \"Learning About When to Call Your Doctor During Pregnancy (Up to 20 Weeks). \"     If you do not have an account, please click on the \"Sign Up Now\" link. Current as of: October 8, 2020               Content Version: 12.8  © 2006-2021 Healthwise, Incorporated. Care instructions adapted under license by [facility] ([facility]). If you have questions about a medical condition or this instruction, always ask your healthcare professional. [place] [address] any warranty or liability for your use of this information.

## 2021-04-21 NOTE — LETTER
21     RE:  Trisha Manual   : 1995   AGE: 31 y.o. REFERRING PHYSICIAN:                  Rafita Torres MD    Dear     Mrs. Trisha Pak a 31 y.o.  Beryle Martini  is seen today on follow up in our office. REASON FOR APPOINTMENT:  · Follow-up on a pregnant patient with dichorionic diamniotic twin gestation and thrombophilia. MEDICATIONS:    Prenatal Vitamins once per day   Lovenox 40 mg subcutaneous once per day. Vitamin D and calcium supplement. INTERVAL HISTORY:  Mrs Trisha Pak had an uneventful course of pregnancy since her last visit to our office. When seen today in our office she had no complaints. PHYSICAL EXAMINATION:  General Appearance:  Healthy looking, alert, no acute distress. Eyes:     No pallor, no icterus, no photophobia. Ears:     No ear drainage. Nose:     No nasal drainage, no paranasal sinus tenderness. Throat:   Mucosa moist, no oral thrush, no exudate. Neck:     No nuchal rigidity. Back:     No CVA tenderness. Abdomen:    Soft nontender. Extremities:    No pretibial pitting edema, no calf muscle tenderness. Skin:     No rashes, no lesions. BP: 109/76 Weight: 235 lb 12.8 oz (107 kg) Height: 5' 4\" (162.6 cm) Pulse: 95     Body mass index is 40.47 kg/m². Urine dipstick:  Glucose : Negative   Albumin:  Negative       An ultrasound evaluation was done in our office today. Please refer to the enclosed copy of the ultrasound report for further information. Chart and Lab Work Review:  I reviewed with the patient the result of the:  · Cell free DNA test collected on 3/30/2021 that showed low probability of having baby with trisomy 24, 25 or 13. IMPRESSION:  1. A  20w3d  Dichorionic diamniotic intrauterine twin gestation. 2. Maternal obesity. 3. Family history of skeletal dysplasia (2 siblings delivered at term,  neonatally). 4. Negative Personal History of DVT.   5. 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 The benefits and limitations of ultrasound in prenatal diagnosis and the fact that some defects might not always be seen by ultrasound. Estimated incidence of these defects in the general population is 2- 4%. 2. The size of each baby is appropriate for gestational age, adequate concordant growth is noted. No anomalies are noted. 3. Only genetic amniocentesis can rule out fetal chromosomal anomalies, normal ultrasound does not. Based on her age and the absence of chromosomal markers by ultrasound today, the risk of chromosomal anomalies is small and does not indicate a need for an amniocentesis, a procedure that might cause pregnancy loss. ( the risk of loss is quoted to be between 1:200 to 1:500). 4. An amniocentesis is indicated if fetal structural defects are seen or if the first Trimester or second trimester hormonal screening test (quad screen) show an increase risk for Chromosomal anomalies. 5.  She was reassured by the result of the cell free DNA test (NIPT). I explained to her that this is not a diagnostic test, it can miss some chromosomal anomalies. .  6. She is carrying dichorionic diamniotic twin gestation. There is no risk of fetal fetal transfusion. Twin gestation is associated with an increased risk of:  · Premature delivery. (Cervical length today was reassuring against risk of  delivery.)  · Disturbance in the growth of her babies (size is appropriate for gestational age on each baby and concordant growth is noted)   7. Obesity is assosiated with an increased risk of developing gestational diabetes, and disturbance in the growth of her baby, such as Large for dates and small for Dates.   Gestational diabetes was ruled out with a negative two hour Glucose tolerance test, collected in your office on 3/10/2021. The test should be repeated at 26 to 28 weeks of gestation. 8. She is to continue the thromboprophylactic treatment and continue the treatment with calcium and vitamin D supplementation. 9. I also recommend initiation of treatment with baby aspirin 81 mg once per day to delay onset and decrease likelihood of developing preeclampsia. 10. She gives history of having two siblings with skeletal dysplasia. The condition in this situation is genetically inherited (not a de Juanpablo mutation). I discussed with the patient the fact that the condition might be detected antenatally on genetic amniocentesis or on ultrasound done in the second trimester. I asked the patient if she is interested in prenatal genetic testing for the skelettal dysplasia. She declined it. She said that she wouldn't consider having termination of pregnancy, if 1 or both of her babies have skeletal dysplasia, chromosomal abnormalities birth defects mental or motor retardation. 11. She is to continue to follow with you in your office for ongoing obstetric care. 12. I recommend a follow-up ultrasound evaluation in 3 weeks to check on cervical length and on fetal wellbeing and growth. Thank you again, doctor, for allowing us to be of service to your patient. If I can be of further assistance, please do not hesitate to call. Sincerely,        Cristina Montenegro M.D., [facility]    Time spent on the encounter was over 15 minutes including counseling  coordination of care and direct face-to-face contact with the patient. Current encounter billing:  [place] OFFICE OUTPATIENT VISIT 10-19 MINUTES [[id number]]  US OB Follow Up Transabdominal Approach [[id number] Custom] x2 twins  US OB Transvaginal [[id number] Custom]    **This report has been created using voice recognition software.  It may contain minor errors     which are inherent in voice recognition technology**

## 2021-05-12 ENCOUNTER — ROUTINE PRENATAL (OUTPATIENT)
Dept: OBGYN CLINIC | Age: 26
End: 2021-05-12
Payer: COMMERCIAL

## 2021-05-12 ENCOUNTER — ANCILLARY PROCEDURE (OUTPATIENT)
Dept: OBGYN CLINIC | Age: 26
End: 2021-05-12
Payer: COMMERCIAL

## 2021-05-12 VITALS
WEIGHT: 241.6 LBS | DIASTOLIC BLOOD PRESSURE: 74 MMHG | HEIGHT: 64 IN | BODY MASS INDEX: 41.25 KG/M2 | SYSTOLIC BLOOD PRESSURE: 109 MMHG | HEART RATE: 109 BPM

## 2021-05-12 DIAGNOSIS — O99.212 MATERNAL OBESITY, ANTEPARTUM, SECOND TRIMESTER: ICD-10-CM

## 2021-05-12 DIAGNOSIS — D68.9 BLOOD COAGULATION DISORDER COMPLICATING PREGNANCY, SECOND TRIMESTER (HCC): ICD-10-CM

## 2021-05-12 DIAGNOSIS — O99.112 BLOOD COAGULATION DISORDER COMPLICATING PREGNANCY, SECOND TRIMESTER (HCC): ICD-10-CM

## 2021-05-12 DIAGNOSIS — O30.042 DICHORIONIC DIAMNIOTIC TWIN PREGNANCY IN SECOND TRIMESTER: Primary | ICD-10-CM

## 2021-05-12 DIAGNOSIS — O35.2XX0 HEREDITARY FAMILIAL DISEASE AFFECTING MANAGEMENT OF MOTHER AND POSSIBLY AFFECTING FETUS, ANTEPARTUM, SINGLE OR UNSPECIFIED FETUS: ICD-10-CM

## 2021-05-12 DIAGNOSIS — Z79.01 LONG TERM CURRENT USE OF ANTICOAGULANT THERAPY: ICD-10-CM

## 2021-05-12 DIAGNOSIS — Z03.75 SUSPECTED SHORTENING OF CERVIX NOT FOUND: ICD-10-CM

## 2021-05-12 DIAGNOSIS — Z3A.23 23 WEEKS GESTATION OF PREGNANCY: ICD-10-CM

## 2021-05-12 LAB
GLUCOSE URINE, POC: NEGATIVE
PROTEIN UA: NEGATIVE

## 2021-05-12 PROCEDURE — 99212 OFFICE O/P EST SF 10 MIN: CPT | Performed by: OBSTETRICS & GYNECOLOGY

## 2021-05-12 PROCEDURE — 76819 FETAL BIOPHYS PROFIL W/O NST: CPT | Performed by: OBSTETRICS & GYNECOLOGY

## 2021-05-12 PROCEDURE — 81002 URINALYSIS NONAUTO W/O SCOPE: CPT | Performed by: OBSTETRICS & GYNECOLOGY

## 2021-05-12 PROCEDURE — 76816 OB US FOLLOW-UP PER FETUS: CPT | Performed by: OBSTETRICS & GYNECOLOGY

## 2021-05-12 PROCEDURE — 99213 OFFICE O/P EST LOW 20 MIN: CPT | Performed by: OBSTETRICS & GYNECOLOGY

## 2021-05-12 PROCEDURE — G8419 CALC BMI OUT NRM PARAM NOF/U: HCPCS | Performed by: OBSTETRICS & GYNECOLOGY

## 2021-05-12 PROCEDURE — 1036F TOBACCO NON-USER: CPT | Performed by: OBSTETRICS & GYNECOLOGY

## 2021-05-12 PROCEDURE — 76817 TRANSVAGINAL US OBSTETRIC: CPT | Performed by: OBSTETRICS & GYNECOLOGY

## 2021-05-12 PROCEDURE — G8427 DOCREV CUR MEDS BY ELIG CLIN: HCPCS | Performed by: OBSTETRICS & GYNECOLOGY

## 2021-05-12 NOTE — PROGRESS NOTES
antigen    RECOMMENDATIONS/PLAN:  I discussed with the patient the following points:    1. The benefits and limitations of ultrasound in prenatal diagnosis and the fact that some defects might not always be seen by ultrasound. Estimated incidence of these defects in the general population is 2- 4%. 2. The size of each baby is appropriate for gestational age, adequate concordant growth is noted. No anomalies are noted. 3. Only genetic amniocentesis can rule out fetal chromosomal anomalies, normal ultrasound does not. 4. She is carrying dichorionic diamniotic twin gestation. There is no risk of fetal fetal transfusion. Twin gestation is associated with an increased risk of:  · Premature delivery. (Cervical length today was reassuring against risk of  delivery.)  · Disturbance in the growth of her babies (size is appropriate for gestational age on each baby and concordant growth is noted)   5. Obesity is assosiated with an increased risk of developing gestational diabetes, and disturbance in the growth of her baby, such as Large for dates and small for Dates. Gestational diabetes was ruled out with a negative two hour Glucose tolerance test, collected in your office on 3/10/2021. The test should be repeated at 26 to 28 weeks of gestation. 6. She is to continue the thromboprophylactic treatment and continue the treatment with calcium and vitamin D supplementation. 7. I also recommend initiation of treatment with baby aspirin 81 mg once per day to delay onset and decrease likelihood of developing preeclampsia. 8. She gives history of having two siblings with skeletal dysplasia. The condition in this situation is genetically inherited (not a de Juanpablo mutation). I discussed with the patient the fact that the condition might be detected antenatally on genetic amniocentesis or on ultrasound done in the second trimester.  I asked the patient if she is interested in prenatal genetic testing for the skelettal dysplasia. She declined it. She said that she wouldn't consider having termination of pregnancy, if 1 or both of her babies have skeletal dysplasia, chromosomal abnormalities birth defects mental or motor retardation. 9. Fetal well-being was confirmed today. The amount of fluid around each baby is normal.  The Biophysical profile score of 8/8 on each baby  is reassuring, and the umbilical artery Doppler studies are normal.  10. She should monitor fetal well-being at home by counting movements after dinner. If she perceives any decrease in movements,  she should call your office immediately. She is also to call, if she develops any headaches, blurred vision, abdominal pain, bleeding, or spotting, which are signs of preeclampsia. 11. She is to continue to follow with you in your office for ongoing obstetric care. 12. I recommend a follow-up ultrasound evaluation in 4 weeks to check on cervical length and on fetal wellbeing and growth. Thank you again, doctor, for allowing us to be of service to your patient. If I can be of further assistance, please do not hesitate to call. Sincerely,        Ephraim Wallace M.D., [facility]    Time spent on the encounter was over 15 minutes including counseling  coordination of care and direct face-to-face contact with the patient. Current encounter billing:  [place] OFFICE OUTPATIENT VISIT 10-19 MINUTES [[id number]]  US OB Follow Up Transabdominal Approach [[id number] Custom] x2 twins  US Fetal Biophysical Profile WO Non Stress Testing [[id number] Custom] x2  US OB Transvaginal [[id number] Custom]    **This report has been created using voice recognition software.  It may contain minor errors     which are inherent in voice recognition technology**

## 2021-05-12 NOTE — LETTER
I discussed with the patient the following points:    1. The benefits and limitations of ultrasound in prenatal diagnosis and the fact that some defects might not always be seen by ultrasound. Estimated incidence of these defects in the general population is 2- 4%. 2. The size of each baby is appropriate for gestational age, adequate concordant growth is noted. No anomalies are noted. 3. Only genetic amniocentesis can rule out fetal chromosomal anomalies, normal ultrasound does not. 4. She is carrying dichorionic diamniotic twin gestation. There is no risk of fetal fetal transfusion. Twin gestation is associated with an increased risk of:  · Premature delivery. (Cervical length today was reassuring against risk of  delivery.)  · Disturbance in the growth of her babies (size is appropriate for gestational age on each baby and concordant growth is noted)   5. Obesity is assosiated with an increased risk of developing gestational diabetes, and disturbance in the growth of her baby, such as Large for dates and small for Dates. Gestational diabetes was ruled out with a negative two hour Glucose tolerance test, collected in your office on 3/10/2021. The test should be repeated at 26 to 28 weeks of gestation. 6. She is to continue the thromboprophylactic treatment and continue the treatment with calcium and vitamin D supplementation. 7. I also recommend initiation of treatment with baby aspirin 81 mg once per day to delay onset and decrease likelihood of developing preeclampsia. 8. She gives history of having two siblings with skeletal dysplasia. The condition in this situation is genetically inherited (not a de Juanpablo mutation). I discussed with the patient the fact that the condition might be detected antenatally on genetic amniocentesis or on ultrasound done in the second trimester. I asked the patient if she is interested in prenatal genetic testing for the skelettal dysplasia. She declined it.  She said that she wouldn't consider having termination of pregnancy, if 1 or both of her babies have skeletal dysplasia, chromosomal abnormalities birth defects mental or motor retardation. 9. Fetal well-being was confirmed today. The amount of fluid around each baby is normal.  The Biophysical profile score of 8/8 on each baby  is reassuring, and the umbilical artery Doppler studies are normal.  10. She should monitor fetal well-being at home by counting movements after dinner. If she perceives any decrease in movements,  she should call your office immediately. She is also to call, if she develops any headaches, blurred vision, abdominal pain, bleeding, or spotting, which are signs of preeclampsia. 11. She is to continue to follow with you in your office for ongoing obstetric care. 12. I recommend a follow-up ultrasound evaluation in 4 weeks to check on cervical length and on fetal wellbeing and growth. Thank you again, doctor, for allowing us to be of service to your patient. If I can be of further assistance, please do not hesitate to call. Sincerely,        Hunter West M.D., [address] [place] [address]    Time spent on the encounter was over 15 minutes including counseling  coordination of care and direct face-to-face contact with the patient. Current encounter billing:  [place] OFFICE OUTPATIENT VISIT 10-19 MINUTES [[id number]]  US OB Follow Up Transabdominal Approach [[id number] Custom] x2 twins  US Fetal Biophysical Profile WO Non Stress Testing [[id number] Custom] x2  US OB Transvaginal [[id number] Custom]    **This report has been created using voice recognition software.  It may contain minor errors     which are inherent in voice recognition technology**

## 2021-06-09 ENCOUNTER — ANCILLARY PROCEDURE (OUTPATIENT)
Dept: OBGYN CLINIC | Age: 26
End: 2021-06-09
Payer: COMMERCIAL

## 2021-06-09 ENCOUNTER — ROUTINE PRENATAL (OUTPATIENT)
Dept: OBGYN CLINIC | Age: 26
End: 2021-06-09
Payer: COMMERCIAL

## 2021-06-09 VITALS
WEIGHT: 250 LBS | BODY MASS INDEX: 42.68 KG/M2 | HEIGHT: 64 IN | SYSTOLIC BLOOD PRESSURE: 111 MMHG | DIASTOLIC BLOOD PRESSURE: 69 MMHG | HEART RATE: 107 BPM

## 2021-06-09 DIAGNOSIS — O35.2XX0 HEREDITARY FAMILIAL DISEASE AFFECTING MANAGEMENT OF MOTHER AND POSSIBLY AFFECTING FETUS, ANTEPARTUM, SINGLE OR UNSPECIFIED FETUS: ICD-10-CM

## 2021-06-09 DIAGNOSIS — D68.9 BLOOD COAGULATION DISORDER COMPLICATING PREGNANCY, SECOND TRIMESTER (HCC): ICD-10-CM

## 2021-06-09 DIAGNOSIS — Z79.01 LONG TERM CURRENT USE OF ANTICOAGULANT THERAPY: ICD-10-CM

## 2021-06-09 DIAGNOSIS — O30.042 DICHORIONIC DIAMNIOTIC TWIN PREGNANCY IN SECOND TRIMESTER: Primary | ICD-10-CM

## 2021-06-09 DIAGNOSIS — O99.212 MATERNAL OBESITY, ANTEPARTUM, SECOND TRIMESTER: ICD-10-CM

## 2021-06-09 DIAGNOSIS — O99.112 BLOOD COAGULATION DISORDER COMPLICATING PREGNANCY, SECOND TRIMESTER (HCC): ICD-10-CM

## 2021-06-09 DIAGNOSIS — Z3A.27 27 WEEKS GESTATION OF PREGNANCY: ICD-10-CM

## 2021-06-09 DIAGNOSIS — Z03.75 SUSPECTED SHORTENING OF CERVIX NOT FOUND: ICD-10-CM

## 2021-06-09 LAB
GLUCOSE URINE, POC: NEGATIVE
PROTEIN UA: NEGATIVE

## 2021-06-09 PROCEDURE — 76820 UMBILICAL ARTERY ECHO: CPT | Performed by: OBSTETRICS & GYNECOLOGY

## 2021-06-09 PROCEDURE — G8419 CALC BMI OUT NRM PARAM NOF/U: HCPCS | Performed by: OBSTETRICS & GYNECOLOGY

## 2021-06-09 PROCEDURE — G8427 DOCREV CUR MEDS BY ELIG CLIN: HCPCS | Performed by: OBSTETRICS & GYNECOLOGY

## 2021-06-09 PROCEDURE — 76816 OB US FOLLOW-UP PER FETUS: CPT | Performed by: OBSTETRICS & GYNECOLOGY

## 2021-06-09 PROCEDURE — 99212 OFFICE O/P EST SF 10 MIN: CPT | Performed by: OBSTETRICS & GYNECOLOGY

## 2021-06-09 PROCEDURE — 81002 URINALYSIS NONAUTO W/O SCOPE: CPT | Performed by: OBSTETRICS & GYNECOLOGY

## 2021-06-09 PROCEDURE — 1036F TOBACCO NON-USER: CPT | Performed by: OBSTETRICS & GYNECOLOGY

## 2021-06-09 PROCEDURE — 76817 TRANSVAGINAL US OBSTETRIC: CPT | Performed by: OBSTETRICS & GYNECOLOGY

## 2021-06-09 PROCEDURE — 76819 FETAL BIOPHYS PROFIL W/O NST: CPT | Performed by: OBSTETRICS & GYNECOLOGY

## 2021-06-09 PROCEDURE — 99213 OFFICE O/P EST LOW 20 MIN: CPT | Performed by: OBSTETRICS & GYNECOLOGY

## 2021-06-09 NOTE — PATIENT INSTRUCTIONS
Please arrive for your scheduled appointment at least 15 minutes early with your actual insurance card+ a photo ID. Also if you need any refills ordered or have questions, it may take up 48 hours to reply. Please allow ample time for your refills. Call me when you use last refill. Thank you for your cooperation. Any questions contact Julia at [phone]. If you are experiencing an emergency and need immediate help, call 911 or go to go emergency room or labor and delivery. if you are sick, not feeling well or have an infectious process going on please reschedule your appointment by calling [phone]. Also if any family members are not feeling well, please do not bring them to your appointment. We appreciate your cooperation. We are doing this in order to protect our pregnant mothers+ their babies. Call your primary obstetrician with bleeding, leaking of fluid, abdominal tenderness, headache, blurry vision, epigastric pain and increased urinary frequency. Patient Education        Weeks 26 to 30 of Your Pregnancy: Care Instructions  Overview     You are now entering your last trimester of pregnancy. Your baby is growing quickly. Nabil Stack probably feel your baby moving around more often. Your doctor may ask you to count your baby's kicks. Your back may ache as your body gets used to your baby's size and length. If you haven't already had the Tdap shot during this pregnancy, talk to your doctor about getting it. It will help protect your  against pertussis infection. During this time, it's important to take care of yourself and pay attention to what your body needs. If you feel sexual, you can explore ways to be close with your partner that match your comfort and desire. Follow-up care is a key part of your treatment and safety. Be sure to make and go to all appointments, and call your doctor if you are having problems.  It's also a good idea to know your test results and keep a list of the medicines you belly button to find the top of your uterus. Check to see if it is tight. ? Contractions can be weak or strong. Record your contractions for an hour. Time a contraction from the start of one contraction to the start of the next one.  ? Single or several strong contractions without a pattern are called McQueeney-Claros contractions. They are practice contractions but not the start of labor. They often stop if you change what you are doing. ? Call your doctor if you have regular contractions. Where can you learn more? Go to https://Lanyonpesauloeweb.RECOMY.COM. org and sign in to your Deliv account. Enter H039 in the Gladitood box to learn more about \"Weeks 26 to 30 of Your Pregnancy: Care Instructions. \"     If you do not have an account, please click on the \"Sign Up Now\" link. Current as of: October 8, 2020               Content Version: 12.8  © 2006-2021 [employer]. Care instructions adapted under license by [facility] ([facility]). If you have questions about a medical condition or this instruction, always ask your healthcare professional. [place] [address] any warranty or liability for your use of this information. Patient Education        Learning About When to Call Your Doctor During Pregnancy (After 20 Weeks)  Your Care Instructions  It's common to have concerns about what might be a problem during pregnancy. Although most pregnant women don't have any serious problems, it's important to know when to call your doctor if you have certain symptoms or signs of labor. These are general suggestions. Your doctor may give you some more information about when to call. When to call your doctor (after 20 weeks)  Call 911 anytime you think you may need emergency care. For example, call if:  · You have severe vaginal bleeding. · You have sudden, severe pain in your belly. · You passed out (lost consciousness). · You have a seizure.   · You see or feel the umbilical N531 in the [facility] box to learn more about \"Learning About When to Call Your Doctor During Pregnancy (After 20 Weeks). \"     If you do not have an account, please click on the \"Sign Up Now\" link. Current as of: October 8, 2020               Content Version: 12.8  © 2006-2021 [employer]. Care instructions adapted under license by [facility] ([facility]). If you have questions about a medical condition or this instruction, always ask your healthcare professional. [place] [address] any warranty or liability for your use of this information. Patient Education        Clotting Factor Deficiencies: Care Instructions  Your Care Instructions     Clotting factors are substances in the blood that help stop bleeding after a cut or injury. They also prevent sudden bleeding. In people who have clotting factor problems, the clotting factors don't work right or, in some cases, are missing. When blood does not clot well, even minor injuries can cause serious bleeding. This can lead to blood loss, injury to internal organs, or damage to muscles or joints. Several conditions, including hemophilia, can make it hard for the blood to clot. Your doctor can treat you by giving you replacement clotting factors. You also may take medicine to prevent bleeding. You may often have clotting factors transfused into a vein to prevent bleeding, or you may get them as needed. You may eventually learn to do this at home. You can also try to prevent injuries that can cause you to bleed. Follow-up care is a key part of your treatment and safety. Be sure to make and go to all appointments, and call your doctor if you are having problems. It's also a good idea to know your test results and keep a list of the medicines you take. How can you care for yourself at home? · Take your medicines exactly as prescribed. Call your doctor if you think you are having a problem with your medicine.  You will get more stop. This means it's still bleeding after pressure has been applied 3 times for 10 minutes each time (30 minutes total). ? Your stools are black and look like tar, or they have streaks of blood. ? You have blood in your urine. ? You have joint pain. ? You have bruises or blood spots under your skin. Watch closely for changes in your health, and be sure to contact your doctor if:    · You do not get better as expected. Where can you learn more? Go to https://NowForce.White Shoe Media. org and sign in to your ViaCube account. Enter Q279 in the Zenput box to learn more about \"Clotting Factor Deficiencies: Care Instructions. \"     If you do not have an account, please click on the \"Sign Up Now\" link. Current as of: September 23, 2020               Content Version: 12.8  © 1408-9127 [employer]. Care instructions adapted under license by [facility] ([facility]). If you have questions about a medical condition or this instruction, always ask your healthcare professional. [place] [address] any warranty or liability for your use of this information.

## 2021-06-09 NOTE — PROGRESS NOTES
discussed with the patient the following points:    1. The benefits and limitations of ultrasound in prenatal diagnosis and the fact that some defects might not always be seen by ultrasound. Estimated incidence of these defects in the general population is 2- 4%. 2. The size of each baby is appropriate for gestational age, adequate concordant growth is noted. No anomalies are noted. 3. Only genetic amniocentesis can rule out fetal chromosomal anomalies, normal ultrasound does not. 4. She is carrying dichorionic diamniotic twin gestation. There is no risk of fetal fetal transfusion. Twin gestation is associated with an increased risk of:  · Premature delivery. (Cervical length today was reassuring against risk of  delivery.)  · Disturbance in the growth of her babies (size is appropriate for gestational age on each baby and concordant growth is noted)   5. Obesity is assosiated with an increased risk of developing gestational diabetes, and disturbance in the growth of her baby, such as Large for dates and small for Dates. She said that gestational diabetes was ruled out with negative glucose tolerance test that was done in your office 2021. Result of the test is not available for review. 6. She is to continue the thromboprophylactic treatment and continue the treatment with calcium and vitamin D supplementation. 7. She should continue the treatment with baby aspirin 81 mg once per day to delay onset and decrease likelihood of developing preeclampsia. 8. She gives history of having two siblings with skeletal dysplasia. The condition in this situation is genetically inherited (not a de Juanpablo mutation). I discussed with the patient the fact that the condition might be detected antenatally on genetic amniocentesis or on ultrasound done in the second trimester. I asked the patient if she is interested in prenatal genetic testing for the skelettal dysplasia. She declined it.  She said that she wouldn't consider having termination of pregnancy, if 1 or both of her babies have skeletal dysplasia, chromosomal abnormalities birth defects mental or motor retardation. 9. Fetal well-being was confirmed today. The amount of fluid around each baby is normal.  The Biophysical profile score of 8/8 on each baby  is reassuring, and the umbilical artery Doppler studies are normal.  10. She should monitor fetal well-being at home by counting movements after dinner. If she perceives any decrease in movements,  she should call your office immediately. She is also to call, if she develops any headaches, blurred vision, abdominal pain, bleeding, or spotting, which are signs of preeclampsia. 11. She is to continue to follow with you in your office for ongoing obstetric care. 12. I recommend a follow-up ultrasound evaluation in 3 weeks to check on cervical length and on fetal wellbeing and growth. Thank you again, doctor, for allowing us to be of service to your patient. If I can be of further assistance, please do not hesitate to call. Sincerely,        Valentino Kenny M.D., [facility]    Time spent on the encounter was over 15 minutes including counseling  coordination of care and direct face-to-face contact with the patient. Current encounter billing:  [place] OFFICE OUTPATIENT VISIT 10-19 MINUTES [[id number]]  US OB Follow Up Transabdominal Approach [[id number] Custom] x2 twins  US Fetal Biophysical Profile WO Non Stress Testing [[id number] Custom] x2  US OB Transvaginal [[id number] Custom]      **This report has been created using voice recognition software.  It may contain minor errors     which are inherent in voice recognition technology**

## 2021-06-09 NOTE — LETTER
21     RE:  Suzette Byers   : 1995   AGE: 31 y.o. REFERRING PHYSICIAN:                  Stephanie Parisi MD    Dear     Mrs. Suzette Byers a 31 y.o.  Vikram Hay  is seen today on follow up in our office. REASON FOR APPOINTMENT:  · Follow-up on a pregnant patient with dichorionic diamniotic twin gestation and thrombophilia. MEDICATIONS:    · Prenatal Vitamins once per day   · Lovenox 40 mg subcutaneous once per day. · Vitamin D and calcium supplement. · Baby aspirin 81 mg once per day. INTERVAL HISTORY:  Mrs Suzette Byers had an uneventful course of pregnancy since her last visit to our office. When seen today in our office she had no complaints. PHYSICAL EXAMINATION:  General Appearance:  Healthy looking, alert, no acute distress. Eyes:     No pallor, no icterus, no photophobia. Ears:     No ear drainage. Nose:     No nasal drainage, no paranasal sinus tenderness. Throat:   Mucosa moist, no oral thrush, no exudate. Neck:     No nuchal rigidity. Back:     No CVA tenderness. Abdomen:    Soft nontender. Extremities:    No pretibial pitting edema, no calf muscle tenderness. Skin:     No rashes, no lesions. BP: 111/69 Weight: 250 lb (113.4 kg) Height: 5' 4\" (162.6 cm) Pulse: 107     Body mass index is 42.91 kg/m². Urine dipstick:  Glucose : Negative   Albumin:  Negative       An ultrasound evaluation was done in our office today. Please refer to the enclosed copy of the ultrasound report for further information. IMPRESSION:  1. A  27w3d  Dichorionic diamniotic intrauterine twin gestation. 2. Maternal obesity. 3. Family history of skeletal dysplasia (2 siblings delivered at term,  neonatally). 4. Negative Personal History of DVT. 5. 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 The benefits and limitations of ultrasound in prenatal diagnosis and the fact that some defects might not always be seen by ultrasound. Estimated incidence of these defects in the general population is 2- 4%. 2. The size of each baby is appropriate for gestational age, adequate concordant growth is noted. No anomalies are noted. 3. Only genetic amniocentesis can rule out fetal chromosomal anomalies, normal ultrasound does not. 4. She is carrying dichorionic diamniotic twin gestation. There is no risk of fetal fetal transfusion. Twin gestation is associated with an increased risk of:  · Premature delivery. (Cervical length today was reassuring against risk of  delivery.)  · Disturbance in the growth of her babies (size is appropriate for gestational age on each baby and concordant growth is noted)   5. Obesity is assosiated with an increased risk of developing gestational diabetes, and disturbance in the growth of her baby, such as Large for dates and small for Dates. She said that gestational diabetes was ruled out with negative glucose tolerance test that was done in your office 2021. Result of the test is not available for review. 6. She is to continue the thromboprophylactic treatment and continue the treatment with calcium and vitamin D supplementation. 7. She should continue the treatment with baby aspirin 81 mg once per day to delay onset and decrease likelihood of developing preeclampsia. 8. She gives history of having two siblings with skeletal dysplasia. The condition in this situation is genetically inherited (not a de Juanpablo mutation). I discussed with the patient the fact that the condition might be detected antenatally on genetic amniocentesis or on ultrasound done in the second trimester. I asked the patient if she is interested in prenatal genetic testing for the skelettal dysplasia. She declined it.  She said that she wouldn't consider having termination of pregnancy, if 1 or both of her babies have skeletal dysplasia, chromosomal abnormalities birth defects mental or motor retardation. 9. Fetal well-being was confirmed today. The amount of fluid around each baby is normal.  The Biophysical profile score of 8/8 on each baby  is reassuring, and the umbilical artery Doppler studies are normal.  10. She should monitor fetal well-being at home by counting movements after dinner. If she perceives any decrease in movements,  she should call your office immediately. She is also to call, if she develops any headaches, blurred vision, abdominal pain, bleeding, or spotting, which are signs of preeclampsia. 11. She is to continue to follow with you in your office for ongoing obstetric care. 12. I recommend a follow-up ultrasound evaluation in 3 weeks to check on cervical length and on fetal wellbeing and growth. Thank you again, doctor, for allowing us to be of service to your patient. If I can be of further assistance, please do not hesitate to call. Sincerely,        Bam Fine M.D., Darus Ohm    Time spent on the encounter was over 15 minutes including counseling  coordination of care and direct face-to-face contact with the patient. Current encounter billing:  OK OFFICE OUTPATIENT VISIT 10-19 MINUTES [[id number]]  US OB Follow Up Transabdominal Approach [WKX678 Custom] x2 twins  US Fetal Biophysical Profile WO Non Stress Testing [21187 Custom] x2  US OB Transvaginal [78885 Custom]      **This report has been created using voice recognition software.  It may contain minor errors     which are inherent in voice recognition technology**

## 2021-06-20 ENCOUNTER — ANCILLARY PROCEDURE (OUTPATIENT)
Dept: OBGYN CLINIC | Age: 26
DRG: 566 | End: 2021-06-20
Payer: COMMERCIAL

## 2021-06-20 ENCOUNTER — HOSPITAL ENCOUNTER (INPATIENT)
Age: 26
LOS: 1 days | Discharge: HOME | DRG: 566 | End: 2021-06-23
Attending: OBSTETRICS & GYNECOLOGY | Admitting: OBSTETRICS & GYNECOLOGY
Payer: COMMERCIAL

## 2021-06-20 PROBLEM — Z3A.29 29 WEEKS GESTATION OF PREGNANCY: Status: ACTIVE | Noted: 2021-06-20

## 2021-06-20 LAB
BACTERIA: ABNORMAL /HPF
BILIRUBIN URINE: NEGATIVE
BLOOD, URINE: NEGATIVE
CLARITY: CLEAR
COLOR: YELLOW
FETAL FIBRONECTIN: NEGATIVE
GLUCOSE URINE: NEGATIVE MG/DL
HCT VFR BLD CALC: 29.9 % (ref 34–48)
HEMOGLOBIN: 9.6 G/DL (ref 11.5–15.5)
KETONES, URINE: NEGATIVE MG/DL
LEUKOCYTE ESTERASE, URINE: ABNORMAL
MCH RBC QN AUTO: 28.6 PG (ref 26–35)
MCHC RBC AUTO-ENTMCNC: 32.1 % (ref 32–34.5)
MCV RBC AUTO: 89 FL (ref 80–99.9)
NITRITE, URINE: NEGATIVE
PDW BLD-RTO: 14.6 FL (ref 11.5–15)
PH UA: 7.5 (ref 5–9)
PLATELET # BLD: 258 E9/L (ref 130–450)
PMV BLD AUTO: 9.8 FL (ref 7–12)
PROTEIN UA: NEGATIVE MG/DL
RBC # BLD: 3.36 E12/L (ref 3.5–5.5)
RBC UA: ABNORMAL /HPF (ref 0–2)
SPECIFIC GRAVITY UA: 1.02 (ref 1–1.03)
UROBILINOGEN, URINE: 0.2 E.U./DL
WBC # BLD: 9.2 E9/L (ref 4.5–11.5)
WBC UA: ABNORMAL /HPF (ref 0–5)

## 2021-06-20 PROCEDURE — 99242 OFF/OP CONSLTJ NEW/EST SF 20: CPT | Performed by: OBSTETRICS & GYNECOLOGY

## 2021-06-20 PROCEDURE — 76820 UMBILICAL ARTERY ECHO: CPT | Performed by: OBSTETRICS & GYNECOLOGY

## 2021-06-20 PROCEDURE — 82731 ASSAY OF FETAL FIBRONECTIN: CPT

## 2021-06-20 PROCEDURE — 76821 MIDDLE CEREBRAL ARTERY ECHO: CPT | Performed by: OBSTETRICS & GYNECOLOGY

## 2021-06-20 PROCEDURE — 81001 URINALYSIS AUTO W/SCOPE: CPT

## 2021-06-20 PROCEDURE — 76819 FETAL BIOPHYS PROFIL W/O NST: CPT | Performed by: OBSTETRICS & GYNECOLOGY

## 2021-06-20 PROCEDURE — 76815 OB US LIMITED FETUS(S): CPT | Performed by: OBSTETRICS & GYNECOLOGY

## 2021-06-20 PROCEDURE — 6360000002 HC RX W HCPCS: Performed by: OBSTETRICS & GYNECOLOGY

## 2021-06-20 PROCEDURE — 85027 COMPLETE CBC AUTOMATED: CPT

## 2021-06-20 PROCEDURE — 2580000003 HC RX 258: Performed by: OBSTETRICS & GYNECOLOGY

## 2021-06-20 PROCEDURE — 36415 COLL VENOUS BLD VENIPUNCTURE: CPT

## 2021-06-20 RX ORDER — BETAMETHASONE SODIUM PHOSPHATE AND BETAMETHASONE ACETATE 3; 3 MG/ML; MG/ML
12 INJECTION, SUSPENSION INTRA-ARTICULAR; INTRALESIONAL; INTRAMUSCULAR; SOFT TISSUE EVERY 24 HOURS
Status: COMPLETED | OUTPATIENT
Start: 2021-06-20 | End: 2021-06-21

## 2021-06-20 RX ORDER — SODIUM CHLORIDE, SODIUM LACTATE, POTASSIUM CHLORIDE, CALCIUM CHLORIDE 600; 310; 30; 20 MG/100ML; MG/100ML; MG/100ML; MG/100ML
INJECTION, SOLUTION INTRAVENOUS CONTINUOUS
Status: DISCONTINUED | OUTPATIENT
Start: 2021-06-20 | End: 2021-06-23 | Stop reason: HOSPADM

## 2021-06-20 RX ORDER — SODIUM CHLORIDE, SODIUM LACTATE, POTASSIUM CHLORIDE, AND CALCIUM CHLORIDE .6; .31; .03; .02 G/100ML; G/100ML; G/100ML; G/100ML
500 INJECTION, SOLUTION INTRAVENOUS ONCE
Status: COMPLETED | OUTPATIENT
Start: 2021-06-20 | End: 2021-06-20

## 2021-06-20 RX ADMIN — BETAMETHASONE SODIUM PHOSPHATE AND BETAMETHASONE ACETATE 12 MG: 3; 3 INJECTION, SUSPENSION INTRA-ARTICULAR; INTRALESIONAL; INTRAMUSCULAR at 16:23

## 2021-06-20 RX ADMIN — SODIUM CHLORIDE, POTASSIUM CHLORIDE, SODIUM LACTATE AND CALCIUM CHLORIDE 500 ML: 600; 310; 30; 20 INJECTION, SOLUTION INTRAVENOUS at 12:50

## 2021-06-20 RX ADMIN — ENOXAPARIN SODIUM 40 MG: 40 INJECTION SUBCUTANEOUS at 21:24

## 2021-06-20 RX ADMIN — SODIUM CHLORIDE, POTASSIUM CHLORIDE, SODIUM LACTATE AND CALCIUM CHLORIDE: 600; 310; 30; 20 INJECTION, SOLUTION INTRAVENOUS at 15:00

## 2021-06-20 NOTE — H&P
Subjective:      Paco Nieves is an 31 y.o. female at 34 and 0/7 weeks gestation presenting with   Chief Complaint   Patient presents with    Abdominal Pain   Patient is a known dichorionic diamniotic twin gestation with thrombophilia on Lovenox and baby aspirin. She is also complaining of contractions every a few minutes lasting few seconds. Other associated symptoms include low back pain. Fetal Movement: normal.  Past Medical History:   Diagnosis Date    Thrombophilia ([address]        Review of Systems  Pertinent items are noted in HPI. Objective:      /73   Pulse 107   Temp 98.2 °F (36.8 °C)   Resp 16   LMP 11/23/2020 (Exact Date)   Blood pressure 116/73, pulse 107, temperature 98.2 °F (36.8 °C), resp. rate 16, last menstrual period 11/23/2020, unknown if currently breastfeeding. General:   alert, appears stated age and cooperative   Cervix:   Closed, thick and high.    FHT:   135 and 140 BPM     Lab Review    CBC:   Lab Results   Component Value Date    WBC 11.2 06/12/2017    RBC 4.02 06/12/2017    HGB 8.8 06/15/2017    HCT 34.9 06/12/2017    MCV 86.8 06/12/2017    MCH 29.4 06/12/2017    MCHC 33.8 06/12/2017    RDW 13.6 06/12/2017     06/12/2017    MPV 10.8 06/12/2017     CMP:  No results found for: NA, K, CL, CO2, BUN, CREATININE, GFRAA, AGRATIO, LABGLOM, GLUCOSE, PROT, LABALBU, CALCIUM, BILITOT, ALKPHOS, AST, ALT  U/A:    Lab Results   Component Value Date    NITRITE NEG 01/12/2021    COLORU Yellow 04/14/2017    PHUR 6.5 01/12/2021    PHUR 7.0 04/14/2017    WBCUA 5-10 04/14/2017    RBCUA 0-1 04/14/2017    BACTERIA RARE 04/14/2017    CLARITYU Clear 04/14/2017    SPECGRAV 1,000 01/12/2021    SPECGRAV 1.010 04/14/2017    LEUKOCYTESUR NEG 01/12/2021    LEUKOCYTESUR SMALL 04/14/2017    UROBILINOGEN 0.2 04/14/2017    BILIRUBINUR NEG 01/12/2021    BLOODU NEG 01/12/2021    BLOODU Negative 04/14/2017    GLUCOSEU negative 06/09/2021    GLUCOSEU Negative 04/14/2017          Assessment: Premature Labor    29 weeks gestation  Dichorionic diamniotic twins  Thrombophilia    Plan:      Monitored for contractions - findings: contractions every 3 minutes and reactive strip. Orders: IV LR started , FFN and MFM consult. Dr. Yo Stern and Dr. Mu Piedra notified.      May Travis MD,MD,6/20/2021 11:41 AM

## 2021-06-20 NOTE — PROGRESS NOTES
RN at bedside continuously [id number] in attempt to trace Baby B. FHTs audible 150s. Much fetal movement noted.  1917 tracing baby A and baby B

## 2021-06-20 NOTE — PROGRESS NOTES
Pt is a , twin pregnancy, that presents to l&d with complaints of abdominal pain. Pt states she experienced the pain last night continuously, but pain has since resolved since then. Pt states she just wanted to ensure the pain was not \"anything bad\". Pt deneis any ctx, vb, or lof.  States +fm. efm applied

## 2021-06-20 NOTE — PROGRESS NOTES
House officer and Dr. Noah Ahumada at [address] [place] [address]. Orders received. Pt to see mfm. MFM in house.  Physician to physician done regarding MFM referral.

## 2021-06-20 NOTE — PROGRESS NOTES
Dr. Raisa Benitez notified per dr. Malu Monroy, pt to be staying over night for 2nd dose of celestone. Pt may eat dinner. Notified pt take lovenox at home, lovenox to be administered as ordered for pt hx of thrombophilia.

## 2021-06-20 NOTE — CONSULTS
biophysical profiles are reassuring with a score of 8/8 for (A);  8/8 for (B);  Umbilical artery Doppler studies are reassuring with good end-diastolic flow for each twin. .  Middle Cerebral Artery flows are reassuring with appropriate impedance and adaptation. Cerebroplacental ratio is favorable ( >1.0)     She noted fetal movement, mild  cramping or contractions . ~Pt notes fetal movement, no cramping or contraction. No undue tenderness or distress during exam.   Overall favorable report today. RECOMMENDATIONS:  Continuous fetal heart rate and uterine contraction monitoring should be utilized for now. IV rehydrate- assess for continued contactions    FFN     IF she should continue to contract / cervical change    Celestone should be administered for acceleration of fetal organ maturity. Magnesium sulfate should be administered for fetal neural protection. The patient should be monitored for evidence of chorioamnionitis. DVT prophylaxis should be continued throughout her admission. Further evaluation and management will be dependent on the results of the patient's testing and her clinical presentation. Once again, thank you for allowing us to participate in the care of this patient and if we can be of any further assistance to you, please do not hesitate to contact us. Sincerely,  Sybil Rees MD    I was present during her visit , supervised the ultrasound examination and provided the patient evaluation and management. I spent 26 minutes with the patient of which greater than 50% of the time was used to  the patient, discuss complications and problems related to her pregnancy, or coordinating her care. I answered all of her questions to her satisfaction.

## 2021-06-21 PROCEDURE — 6360000002 HC RX W HCPCS

## 2021-06-21 PROCEDURE — 6360000002 HC RX W HCPCS: Performed by: OBSTETRICS & GYNECOLOGY

## 2021-06-21 RX ORDER — MAGNESIUM SULFATE IN WATER 40 MG/ML
2000 INJECTION, SOLUTION INTRAVENOUS ONCE
Status: COMPLETED | OUTPATIENT
Start: 2021-06-21 | End: 2021-06-21

## 2021-06-21 RX ADMIN — MAGNESIUM SULFATE IN WATER 2000 MG: 40 INJECTION, SOLUTION INTRAVENOUS at 11:18

## 2021-06-21 RX ADMIN — MAGNESIUM SULFATE HEPTAHYDRATE 2000 MG: 40 INJECTION, SOLUTION INTRAVENOUS at 11:18

## 2021-06-21 RX ADMIN — BETAMETHASONE SODIUM PHOSPHATE AND BETAMETHASONE ACETATE 12 MG: 3; 3 INJECTION, SUSPENSION INTRA-ARTICULAR; INTRALESIONAL; INTRAMUSCULAR at 16:23

## 2021-06-21 RX ADMIN — MAGNESIUM SULFATE HEPTAHYDRATE 1000 MG/HR: 40 INJECTION, SOLUTION INTRAVENOUS at 11:53

## 2021-06-21 RX ADMIN — ENOXAPARIN SODIUM 40 MG: 40 INJECTION SUBCUTANEOUS at 21:34

## 2021-06-21 ASSESSMENT — PAIN SCALES - GENERAL: PAINLEVEL_OUTOF10: 0

## 2021-06-21 NOTE — PROGRESS NOTES
Assumed pt care. Pt resting comfortably in bed. Pt states she is only feeling contractions occasionally and they are not uncomfortable. Pt denies LOF, VB. + FM. Call light within reach.  No complaints at this time

## 2021-06-21 NOTE — PROGRESS NOTES
Dr. Will Orr called back updated pt is wanting to eat.  Dr. Will Orr would like [facility] too see pt first.

## 2021-06-22 ENCOUNTER — ANCILLARY PROCEDURE (OUTPATIENT)
Dept: OBGYN CLINIC | Age: 26
DRG: 566 | End: 2021-06-22
Payer: COMMERCIAL

## 2021-06-22 PROBLEM — O47.00 PRETERM CONTRACTIONS: Status: ACTIVE | Noted: 2021-06-22

## 2021-06-22 PROCEDURE — 76821 MIDDLE CEREBRAL ARTERY ECHO: CPT | Performed by: OBSTETRICS & GYNECOLOGY

## 2021-06-22 PROCEDURE — 76819 FETAL BIOPHYS PROFIL W/O NST: CPT | Performed by: OBSTETRICS & GYNECOLOGY

## 2021-06-22 PROCEDURE — 76820 UMBILICAL ARTERY ECHO: CPT | Performed by: OBSTETRICS & GYNECOLOGY

## 2021-06-22 PROCEDURE — 6360000002 HC RX W HCPCS: Performed by: OBSTETRICS & GYNECOLOGY

## 2021-06-22 PROCEDURE — 76815 OB US LIMITED FETUS(S): CPT | Performed by: OBSTETRICS & GYNECOLOGY

## 2021-06-22 PROCEDURE — 1220000000 HC SEMI PRIVATE OB R&B

## 2021-06-22 PROCEDURE — 6370000000 HC RX 637 (ALT 250 FOR IP): Performed by: OBSTETRICS & GYNECOLOGY

## 2021-06-22 RX ORDER — POLYETHYLENE GLYCOL 3350 17 G/17G
17 POWDER, FOR SOLUTION ORAL ONCE
Status: COMPLETED | OUTPATIENT
Start: 2021-06-22 | End: 2021-06-22

## 2021-06-22 RX ADMIN — ENOXAPARIN SODIUM 40 MG: 40 INJECTION SUBCUTANEOUS at 22:10

## 2021-06-22 RX ADMIN — POLYETHYLENE GLYCOL 3350 17 G: 17 POWDER, FOR SOLUTION ORAL at 22:10

## 2021-06-22 NOTE — PROGRESS NOTES
[address] [place], [place]. [address]  Ph: [phone]    Fax: [phone]   June 22, 2021     RE: Simone Miguel   Dear Dr. Melchor Noyola covering  : Thank you for sending   Ms. Aubrie Gonzalez for consultation and ultrasound on  the [facility] [place], [place]    on 6/22/ 2021. · REASON FOR CONSULTATION: Twins- Diamnionic, Dichorionic @ 29w2d    · Active Friday , started mild cramping- didnt go away   ·   Feeling better post bedrest/ IVF    · Contractions o60.0 - resolved   · Family history of inheritable trait o35.2  · High risk pregnancy o09.89  · Obesity o99.21  · Twins - Di/Di o30.04  · Thrombophilia D68.62  ·    · Her chart was reviewed, her medical, surgical , and OBG history was examined along with any supporting documents available to me . · Her recent clinical visits and notes were reviewed. · Her recent laboratory and pathology  testing was reviewed  ·    · Review of Systems :   · CONSTITUTIONAL: No fever, no chills , no undue aches, pain   · HEENT: No headache, no visual changes, no sore throat   · PULM: No dyspnea, no cough  · CARDIO:  No chest pains, no palpitations  · GI: No N/V, no D/C, no diarrhea, no abdominal pain  -  ~NOTE NO BM SINCE SATURDAY   ·  : No dysuria, no vaginal bleeding or fluid leaking or discharge   · She reports good fetal movement   ·    · PHYSICAL EXAMINATION: VSS - Afebrile  BMI 43  · General Appearance: Healthy looking, alert , no acute distress. · Eyes: No pallor, no icterus, no photophobia. · Back: No CVA tenderness. · Abdomen: Soft, non-tender. · Extremities: No pretibial pitting edema  ·    · An ultrasound evaluation was doneagain   today. Please refer to the enclosed copy of the ultrasound report for further detailed information.   · ULTRASOUND IMPRESSION:  @30 w3d  · Twin A= Vertex Female fetus FHR A = 145;  · Twin B= Transverse Female fetus FHR B = 147   ·    · The twins are measuring appropriate for gestational age. a week ago   ·    · Active, responsive babies. The amniotic fluid volume appears normal for each twin. ·    · The biophysical profiles are reassuring with a score of 8/8 for (A);  8/8 for (B);  · Umbilical artery Doppler studies are reassuring with good end-diastolic flow for each twin. .  · Middle Cerebral Artery flows are reassuring with appropriate impedance and adaptation. on twin B   · Cerebroplacental ratio is favorable ( >1.0)   ·    · She noted fetal movement, mild  cramping or contractions . ·    · ~Pt notes fetal movement, no cramping or contraction. No undue tenderness or distress during exam.   · Overall favorable report today. FFN NEGATIVE   ·               Celestone 6/20; 6/21 for acceleration of fetal organ maturity. ·    ·             Magnesium sulfate 6/21 administered for fetal neural protection. · RECOMMENDATIONS:  ·  Change monitoring to 1 hr q shift   ~unless she starts to feel pains/ contractions- then resume continuous tokos. with FHR for at least 10 min q 2 hrs each baby   · IV rehydrate- heplock/   advance to PO   ·  ·    · DVT prophylaxis should be continued throughout her admission. ( Lovenox as scheduled )   ·    Recommend help with constipation; advance diet and activity as tolerated  Assess results for possible  outpt mgmt    ·        ·    ·  Once again, thank you for allowing us to participate in the care of this patient and if we can be of any further assistance to you, please do not hesitate to contact us. · Sincerely,  · Vandana Fairbanks MD  ·    · I was present during her visit , supervised the ultrasound examination and provided the patient evaluation and management. I spent 26 minutes with the patient of which greater than 50% of the time was used to  the patient, discuss complications and problems related to her pregnancy, or coordinating her care. I answered all of her questions to her satisfaction.

## 2021-06-23 VITALS
TEMPERATURE: 98.2 F | RESPIRATION RATE: 16 BRPM | DIASTOLIC BLOOD PRESSURE: 64 MMHG | BODY MASS INDEX: 43.02 KG/M2 | OXYGEN SATURATION: 98 % | HEART RATE: 101 BPM | HEIGHT: 64 IN | WEIGHT: 252 LBS | SYSTOLIC BLOOD PRESSURE: 118 MMHG

## 2021-06-23 NOTE — PROGRESS NOTES
Dr Lamin Serrato states patient may be off monitor overnight, monitor for 1 hour q shift starting in the a.m. may get up and saline lock IV. To be put on monitor if she reports feeling ctx. Patient may have miralax if needed.

## 2021-06-23 NOTE — PROGRESS NOTES
Dr. Baylee Padilla requested monitoring. /66   Pulse 104   Temp 97.8 °F (36.6 °C)   Resp 16   Ht 5' 4\" (1.626 m)   Wt 252 lb (114.3 kg)   LMP 11/23/2020 (Exact Date)   SpO2 98%   BMI 43.26 kg/m²   Fetal heart rate:  Baseline Heart Rate 140/150, accelerations: present  decelerations: absent  Contraction frequency:  0 minutes  Membranes:  Intact    Dr. Baylee Padilla notified.     Plan:   Twins  Ctx/ffn neg  Monitoring per mfm    Annika Douglass MD

## 2021-06-23 NOTE — PROGRESS NOTES
Reviewed and educated patient on discharge instructions. Patient verbalizes understanding and has no further questions at this time. Patient left the unit ambulatory by herself.

## 2021-06-23 NOTE — PROGRESS NOTES
Patient feeling good, no more contractions at this time. [id number] [address] for discharge per Dr. Laura Singh.

## 2021-06-30 ENCOUNTER — ROUTINE PRENATAL (OUTPATIENT)
Dept: OBGYN CLINIC | Age: 26
End: 2021-06-30
Payer: COMMERCIAL

## 2021-06-30 ENCOUNTER — ANCILLARY PROCEDURE (OUTPATIENT)
Dept: OBGYN CLINIC | Age: 26
End: 2021-06-30
Payer: COMMERCIAL

## 2021-06-30 VITALS
HEIGHT: 64 IN | DIASTOLIC BLOOD PRESSURE: 75 MMHG | WEIGHT: 249.6 LBS | HEART RATE: 116 BPM | SYSTOLIC BLOOD PRESSURE: 108 MMHG | BODY MASS INDEX: 42.61 KG/M2

## 2021-06-30 DIAGNOSIS — O30.043 DICHORIONIC DIAMNIOTIC TWIN PREGNANCY IN THIRD TRIMESTER: Primary | ICD-10-CM

## 2021-06-30 DIAGNOSIS — O99.113 BLOOD COAGULATION DISORDER COMPLICATING PREGNANCY, THIRD TRIMESTER (HCC): ICD-10-CM

## 2021-06-30 DIAGNOSIS — Z3A.30 30 WEEKS GESTATION OF PREGNANCY: ICD-10-CM

## 2021-06-30 DIAGNOSIS — E66.01 MATERNAL MORBID OBESITY IN THIRD TRIMESTER, ANTEPARTUM (HCC): ICD-10-CM

## 2021-06-30 DIAGNOSIS — Z03.75 SUSPECTED SHORTENING OF CERVIX NOT FOUND: ICD-10-CM

## 2021-06-30 DIAGNOSIS — O35.2XX0 HEREDITARY FAMILIAL DISEASE AFFECTING MANAGEMENT OF MOTHER AND POSSIBLY AFFECTING FETUS, ANTEPARTUM, SINGLE OR UNSPECIFIED FETUS: ICD-10-CM

## 2021-06-30 DIAGNOSIS — D68.9 BLOOD COAGULATION DISORDER COMPLICATING PREGNANCY, THIRD TRIMESTER (HCC): ICD-10-CM

## 2021-06-30 DIAGNOSIS — Z79.01 LONG TERM CURRENT USE OF ANTICOAGULANT THERAPY: ICD-10-CM

## 2021-06-30 DIAGNOSIS — O99.213 MATERNAL MORBID OBESITY IN THIRD TRIMESTER, ANTEPARTUM (HCC): ICD-10-CM

## 2021-06-30 LAB
GLUCOSE URINE, POC: NEGATIVE
PROTEIN UA: NEGATIVE

## 2021-06-30 PROCEDURE — G8427 DOCREV CUR MEDS BY ELIG CLIN: HCPCS | Performed by: OBSTETRICS & GYNECOLOGY

## 2021-06-30 PROCEDURE — G8419 CALC BMI OUT NRM PARAM NOF/U: HCPCS | Performed by: OBSTETRICS & GYNECOLOGY

## 2021-06-30 PROCEDURE — 81002 URINALYSIS NONAUTO W/O SCOPE: CPT | Performed by: OBSTETRICS & GYNECOLOGY

## 2021-06-30 PROCEDURE — 76815 OB US LIMITED FETUS(S): CPT | Performed by: OBSTETRICS & GYNECOLOGY

## 2021-06-30 PROCEDURE — 76819 FETAL BIOPHYS PROFIL W/O NST: CPT | Performed by: OBSTETRICS & GYNECOLOGY

## 2021-06-30 PROCEDURE — 99213 OFFICE O/P EST LOW 20 MIN: CPT | Performed by: OBSTETRICS & GYNECOLOGY

## 2021-06-30 PROCEDURE — 1111F DSCHRG MED/CURRENT MED MERGE: CPT | Performed by: OBSTETRICS & GYNECOLOGY

## 2021-06-30 PROCEDURE — 1036F TOBACCO NON-USER: CPT | Performed by: OBSTETRICS & GYNECOLOGY

## 2021-06-30 PROCEDURE — 76817 TRANSVAGINAL US OBSTETRIC: CPT | Performed by: OBSTETRICS & GYNECOLOGY

## 2021-06-30 NOTE — PATIENT INSTRUCTIONS
Please arrive for your scheduled appointment at least 15 minutes early with your actual insurance card+ a photo ID. Also if you need any refills ordered or have questions, it may take up 48 hours to reply. Please allow ample time for your refills. Call me when you use last refill. Thank you for your cooperation. Any questions contact Julia at [phone]. If you are experiencing an emergency and need immediate help, call 911 or go to go emergency room or labor and delivery. if you are sick, not feeling well or have an infectious process going on please reschedule your appointment by calling [phone]. Also if any family members are not feeling well, please do not bring them to your appointment. We appreciate your cooperation. We are doing this in order to protect our pregnant mothers+ their babies. Call your primary obstetrician with bleeding, leaking of fluid, abdominal tenderness, headache, blurry vision, epigastric pain and increased urinary frequency. Do kick counts after dinner. Call your primary obstetrician if less than 10 kicks in 2 hours after dinner. Call your primary obstetrician with bleeding, leaking of fluid, abdominal tenderness, headache, blurry vision, epigastric pain and increased urinary frequency. Patient Education        Weeks 30 to 28 of Your Pregnancy: Care Instructions  Overview     You've made it to the final months of your pregnancy! By now your baby is really starting to look like a baby, with hair and plump skin. As you enter the final weeks of pregnancy, the reality of having a baby may start to set in. This is a good time to set up a safe nursery and find quality  if needed. Doing this stuff ahead of time will allow you to focus on caring for and enjoying your new baby. You may also want to take a tour of your hospital's labor and delivery unit.  This will help you get a better idea of what to expect while you're in the hospital.  During these last months, be sure to blood during pregnancy. Your legs may ache or throb. Most varicose veins will go away after the birth. · Avoid standing for long periods of time. Sit with your legs crossed at the ankles, not the knees. · Sit with your feet propped up. · Avoid tight clothing or stockings. Wear support hose. · Exercise regularly. Try walking for at least 30 minutes a day. Where can you learn more? Go to https://CertifypesauloMeta Industrieseb.healthConstant Insight. org and sign in to your [employer]partners account. Enter D454 in the kites.io box to learn more about \"Weeks 30 to 32 of Your Pregnancy: Care Instructions. \"     If you do not have an account, please click on the \"Sign Up Now\" link. Current as of: October 8, 2020               Content Version: 12.9  © 5358-1024 Healthwise, [employer]. Care instructions adapted under license by [facility] ([facility]). If you have questions about a medical condition or this instruction, always ask your healthcare professional. [place] [address] any warranty or liability for your use of this information. Patient Education        Learning About When to Call Your Doctor During Pregnancy (After 20 Weeks)  Your Care Instructions  It's common to have concerns about what might be a problem during pregnancy. Although most pregnant women don't have any serious problems, it's important to know when to call your doctor if you have certain symptoms or signs of labor. These are general suggestions. Your doctor may give you some more information about when to call. When to call your doctor (after 20 weeks)  Call 911  anytime you think you may need emergency care. For example, call if:  · You have severe vaginal bleeding. · You have sudden, severe pain in your belly. · You passed out (lost consciousness). · You have a seizure. · You see or feel the umbilical cord.   · You think you are about to deliver your baby and can't make it safely to the hospital.  Call your doctor now or seek immediate medical care if:  · You have vaginal bleeding. · You have belly pain. · You have a fever. · You have symptoms of preeclampsia, such as:  ? Sudden swelling of your face, hands, or feet. ? New vision problems (such as dimness, blurring, or seeing spots). ? A severe headache. · You have a sudden release of fluid from your vagina. (You think your water broke.)  · You think that you may be in labor. This means that you've had at least 6 contractions in an hour. · You notice that your baby has stopped moving or is moving much less than normal.  · You have symptoms of a urinary tract infection. These may include:  ? Pain or burning when you urinate. ? A frequent need to urinate without being able to pass much urine. ? Pain in the flank, which is just below the rib cage and above the waist on either side of the back. ? Blood in your urine. Watch closely for changes in your health, and be sure to contact your doctor if:  · You have vaginal discharge that smells bad. · You have skin changes, such as:  ? A rash. ? Itching. ? Yellow color to your skin. · You have other concerns about your pregnancy. If you have labor signs at 37 weeks or more  If you have signs of labor at 37 weeks or more, your doctor may tell you to call when your labor becomes more active. Symptoms of active labor include:  · Contractions that are regular. · Contractions that are less than 5 minutes apart. · Contractions that are hard to talk through. Follow-up care is a key part of your treatment and safety. Be sure to make and go to all appointments, and call your doctor if you are having problems. It's also a good idea to know your test results and keep a list of the medicines you take. Where can you learn more? Go to https://chellie.healthAnimal Cell Therapies. org and sign in to your LingoLive account.  Enter  in the [employer] box to learn more about \"Learning About When to Call Your Doctor During Pregnancy (After 20 Weeks). \"     If you do not have an account, please click on the \"Sign Up Now\" link. Current as of: October 8, 2020               Content Version: 12.9  © 2006-2021 Healthwise, [employer]. Care instructions adapted under license by [facility] ([facility]). If you have questions about a medical condition or this instruction, always ask your healthcare professional. [address] any warranty or liability for your use of this information. Patient Education        Counting Your Baby's Kicks: Care Instructions  Your Care Instructions     Counting your baby's kicks is one way your doctor can tell that your baby is healthy. Most women--especially in a first pregnancy--feel their baby move for the first time between 16 and 22 weeks. The movement may feel like flutters rather than kicks. Your baby may move more at certain times of the day. When you are active, you may notice less kicking than when you are resting. At your prenatal visits, your doctor will ask whether the baby is active. In your last trimester, your doctor may ask you to count the number of times you feel your baby move. Follow-up care is a key part of your treatment and safety. Be sure to make and go to all appointments, and call your doctor if you are having problems. It's also a good idea to know your test results and keep a list of the medicines you take. How do you count fetal kicks? · A common method of checking your baby's movement is to count the number of kicks or moves you feel in 1 hour. Ten movements (such as kicks, flutters, or rolls) in 1 hour are normal. Some doctors suggest that you count in the morning until you get to 10 movements. Then you can quit for that day and start again the next day. · Pick your baby's most active time of day to count. This may be any time from morning to evening. · If you do not feel 10 movements in an hour, your baby may be sleeping. Wait for the next hour and count again.   When should you call for help? Call your doctor now or seek immediate medical care if:    · You noticed that your baby has stopped moving or is moving much less than normal.   Watch closely for changes in your health, and be sure to contact your doctor if you have any problems. Where can you learn more? Go to https://chpepiceweb.Haload. org and sign in to your Edumedics account. Enter P790 in the Medicine in Practice box to learn more about \"Counting Your Baby's Kicks: Care Instructions. \"     If you do not have an account, please click on the \"Sign Up Now\" link. Current as of: October 8, 2020               Content Version: 12.9  © 2006-2021 DraftMix. Care instructions adapted under license by [facility] ([facility]). If you have questions about a medical condition or this instruction, always ask your healthcare professional. [place] [address] any warranty or liability for your use of this information. Patient Education        Clotting Factor Deficiencies: Care Instructions  Your Care Instructions     Clotting factors are substances in the blood that help stop bleeding after a cut or injury. They also prevent sudden bleeding. In people who have clotting factor problems, the clotting factors don't work right or, in some cases, are missing. When blood does not clot well, even minor injuries can cause serious bleeding. This can lead to blood loss, injury to internal organs, or damage to muscles or joints. Several conditions, including hemophilia, can make it hard for the blood to clot. Your doctor can treat you by giving you replacement clotting factors. You also may take medicine to prevent bleeding. You may often have clotting factors transfused into a vein to prevent bleeding, or you may get them as needed. You may eventually learn to do this at home. You can also try to prevent injuries that can cause you to bleed. Follow-up care is a key part of your treatment and safety.  Be sure to make and go to all appointments, and call your doctor if you are having problems. It's also a good idea to know your test results and keep a list of the medicines you take. How can you care for yourself at home? · Take your medicines exactly as prescribed. Call your doctor if you think you are having a problem with your medicine. You will get more details on the specific medicines your doctor prescribes. · Stay at a healthy body weight. If you are overweight, the additional stress on joints can trigger bleeding. · Exercise safely. Avoid contact sports. Swim or walk to avoid excess pressure on your joints. Check with your doctor before doing activities that put you at high risk for falls, such as riding a bike. · Brush and floss your teeth daily. This may help you avoid problems that could lead to having a tooth pulled. · Avoid aspirin and nonsteroidal anti-inflammatory drugs (NSAIDs), such as ibuprofen (Advil, Motrin) or naproxen (Aleve). They can increase the chance of bleeding. · Take pain medicines exactly as directed. ? If the doctor gave you a prescription medicine for pain, take it as prescribed. ? If you are not taking a prescription pain medicine, ask your doctor if you can take an over-the-counter medicine. · Take care to prevent accidents at home:  ? Make sure rugs are tacked down so you do not slip. ? Keep furniture with sharp edges out of pathways. ? Use nonskid floor wax. ? Wipe up spills quickly. ? If you live in an area that gets snow and ice in the winter, sprinkle salt on steps and sidewalks. ? Avoid loose-fitting shoes. You might lose your balance and fall. · Wear medical alert jewelry that lists your clotting problem. You can buy this at most [place]. When should you call for help? Call 911 anytime you think you may need emergency care. For example, call if:    · You passed out (lost consciousness).     · You have signs of severe bleeding, which includes:  ?  You have a severe headache that is different from past headaches. ? You vomit blood or what looks like coffee grounds. ? Your stools are maroon or very bloody. Call your doctor now or seek immediate medical care if:    · You are dizzy or lightheaded, or you feel like you may faint.     · You have abnormal bleeding, such as:  ? A nosebleed that you can't easily stop. This means it's still bleeding after pressure has been applied 3 times for 10 minutes each time (30 minutes total). ? Your stools are black and look like tar, or they have streaks of blood. ? You have blood in your urine. ? You have joint pain. ? You have bruises or blood spots under your skin. Watch closely for changes in your health, and be sure to contact your doctor if:    · You do not get better as expected. Where can you learn more? Go to https://Search to Phonepesauloeweb.Dinero Limited. org and sign in to your [employer] account. Enter Z364 in the Online-OR box to learn more about \"Clotting Factor Deficiencies: Care Instructions. \"     If you do not have an account, please click on the \"Sign Up Now\" link. Current as of: September 23, 2020               Content Version: 12.9  © 2006-2021 Healthwise, Incorporated. Care instructions adapted under license by [facility] [employer] [facility] ([facility]). If you have questions about a medical condition or this instruction, always ask your healthcare professional. [place] [address] any warranty or liability for your use of this information.

## 2021-06-30 NOTE — PROGRESS NOTES
21     RE:  Madeleine Bardales   : 1995   AGE: 31 y.o. REFERRING PHYSICIAN:                  Katy Romero MD    Dear     Mrs. Madeleine Bardales a 31 y.o.  Kalin Rodarte  is seen today on follow up in our office. REASON FOR APPOINTMENT:  · Follow-up on a pregnant patient with dichorionic diamniotic twin gestation and thrombophilia. MEDICATIONS:    · Prenatal Vitamins once per day   · Lovenox 40 mg subcutaneous once per day. · Vitamin D and calcium supplement. · Baby aspirin 81 mg once per day. INTERVAL HISTORY:  Since her last visit to our office, Mrs Madeleine Bardales   · Presented to the labor unit of [facility] in [facility] 2021 with complaints of abdominal pain and cramping. She was evaluated by house officer Dr. Leon Worley. Consultation with perinatology Dr. Aruna Penn was obtained.  labor was ruled out she was reassured and sent home on 2021. When seen today in our office she had no complaints. PHYSICAL EXAMINATION:  General Appearance:  Healthy looking, alert, no acute distress. Eyes:     No pallor, no icterus, no photophobia. Ears:     No ear drainage. Nose:     No nasal drainage, no paranasal sinus tenderness. Throat:   Mucosa moist, no oral thrush, no exudate. Neck:     No nuchal rigidity. Back:     No CVA tenderness. Abdomen:    Soft nontender. Extremities:    No pretibial pitting edema, no calf muscle tenderness. Skin:     No rashes, no lesions. BP: 108/75 Weight: 249 lb 9.6 oz (113.2 kg) Height: 5' 4\" (162.6 cm) Pulse: 116     Body mass index is 42.84 kg/m². Urine dipstick:  Glucose : Negative   Albumin:  Negative       An ultrasound evaluation was done in our office today. Please refer to the enclosed copy of the ultrasound report for further information. IMPRESSION:  1. A  70g8gSsincfrhgac diamniotic intrauterine twin gestation. 2. Maternal obesity.   3. Family history of skeletal dysplasia (2 siblings delivered at term,  neonatally). 4. Negative Personal History of DVT. 5. 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 The size of each baby is appropriate for gestational age, adequate concordant growth is noted. No anomalies are noted. 2. Only genetic amniocentesis can rule out fetal chromosomal anomalies, normal ultrasound does not. 3. She is carrying dichorionic diamniotic twin gestation. There is no risk of fetal fetal transfusion. Twin gestation is associated with an increased risk of:  · Premature delivery. (Cervical length today was reassuring against risk of  delivery.)  · Disturbance in the growth of her babies (size is appropriate for gestational age on each baby and concordant growth is noted)   4. Obesity is assosiated with an increased risk of developing gestational diabetes, and disturbance in the growth of her baby, such as Large for dates and small for Dates. She said that gestational diabetes was ruled out with negative glucose tolerance test that was done in your office 2021. Result of the test is not available for review. 5. She is to continue the thromboprophylactic treatment and continue the treatment with calcium and vitamin D supplementation. 6. She should continue the treatment with baby aspirin 81 mg once per day to delay onset and decrease likelihood of developing preeclampsia. 7. She gives history of having two siblings with skeletal dysplasia. The condition in this situation is genetically inherited (not a de Juanpablo mutation). I discussed with the patient the fact that the condition might be detected antenatally on genetic amniocentesis or on ultrasound done in the second trimester.  I asked the patient if she is interested in prenatal genetic testing for the skelettal dysplasia. She declined it. She said that she wouldn't consider having termination of pregnancy, if 1 or both of her babies have skeletal dysplasia, chromosomal abnormalities birth defects mental or motor retardation. 8. Fetal well-being was confirmed today. The amount of fluid around each baby is normal.  The Biophysical profile score of 8/8 on each baby  is reassuring, and the umbilical artery Doppler studies are normal.  9. She should monitor fetal well-being at home by counting movements after dinner. If she perceives any decrease in movements,  she should call your office immediately. She is also to call, if she develops any headaches, blurred vision, abdominal pain, bleeding, or spotting, which are signs of preeclampsia. 10. She is to continue to follow with you in your office for ongoing obstetric care. 11. I recommend a follow-up ultrasound evaluation in 2 weeks to check on cervical length and on fetal wellbeing and growth, and be started on monitoring at that time with nonstress test every 3 to 4 days and biophysical profiles once a week for remainder of pregnancy. Thank you again, doctor, for allowing us to be of service to your patient. If I can be of further assistance, please do not hesitate to call. Sincerely,        Claudetta Standing, M.D., [address] [facility]    Time spent on the encounter was over 25 minutes including counseling  coordination of care and direct face-to-face contact with the patient. Current encounter billing:  MS OFFICE OUTPATIENT VISIT LOW MDM 20-30 MINUTES [[id number]]  US OB 1 or More Fetus Limited [05612 Custom]  US Fetal Biophysical Profile WO Non Stress Testing [26269 Custom] x2  US OB Transvaginal [49749 Custom]    **This report has been created using voice recognition software.  It may contain minor errors     which are inherent in voice recognition technology**

## 2021-06-30 NOTE — LETTER
21     RE:  Marleen Gallardo   : 1995   AGE: 31 y.o. REFERRING PHYSICIAN:                  Andrew Daniel MD    Dear     Mrs. Marleen Gallardo a 31 y.o.  Dewayne Muñiz  is seen today on follow up in our office. REASON FOR APPOINTMENT:  · Follow-up on a pregnant patient with dichorionic diamniotic twin gestation and thrombophilia. MEDICATIONS:    · Prenatal Vitamins once per day   · Lovenox 40 mg subcutaneous once per day. · Vitamin D and calcium supplement. · Baby aspirin 81 mg once per day. INTERVAL HISTORY:  Since her last visit to our office, Mrs Marleen Gallardo   · Presented to the labor unit of [facility] in [facility] 2021 with complaints of abdominal pain and cramping. She was evaluated by house officer Dr. Saintclair Poles. Consultation with perinatology Dr. Mel Treadwell was obtained.  labor was ruled out she was reassured and sent home on 2021. When seen today in our office she had no complaints. PHYSICAL EXAMINATION:  General Appearance:  Healthy looking, alert, no acute distress. Eyes:     No pallor, no icterus, no photophobia. Ears:     No ear drainage. Nose:     No nasal drainage, no paranasal sinus tenderness. Throat:   Mucosa moist, no oral thrush, no exudate. Neck:     No nuchal rigidity. Back:     No CVA tenderness. Abdomen:    Soft nontender. Extremities:    No pretibial pitting edema, no calf muscle tenderness. Skin:     No rashes, no lesions. BP: 108/75 Weight: 249 lb 9.6 oz (113.2 kg) Height: 5' 4\" (162.6 cm) Pulse: 116     Body mass index is 42.84 kg/m². Urine dipstick:  Glucose : Negative   Albumin:  Negative       An ultrasound evaluation was done in our office today. Please refer to the enclosed copy of the ultrasound report for further information. IMPRESSION:  1. A  11p8eFdffmlechdw diamniotic intrauterine twin gestation. 2. Maternal obesity.   3. Family history of skeletal dysplasia (2 siblings delivered at term,

## 2021-07-02 DIAGNOSIS — O99.119 COAGULATION DEFECTS IN PREGNANCY, CHILDBIRTH, PUERPERIUM, ANTEPARTUM (HCC): Primary | ICD-10-CM

## 2021-07-02 DIAGNOSIS — D68.9 COAGULATION DEFECTS IN PREGNANCY, CHILDBIRTH, PUERPERIUM, ANTEPARTUM (HCC): Primary | ICD-10-CM

## 2021-07-07 NOTE — DISCHARGE SUMMARY
[address] [address]                               DISCHARGE SUMMARY    PATIENT NAME: Hilary Clarke                    :        1995  MED REC NO:   [id number]                            ROOM:       [id number]  ACCOUNT NO:   [de-identified]                           ADMIT DATE: 2021  PROVIDER:     Abbey Savage MD                  [address] [place] [address] DATE:  2021    BRIEF HISTORY OF PRESENT ILLNESS AND HOSPITAL COURSE:  Admitted on   for cramping abdominal pain at 29 weeks with dichorionic-diamniotic  twins with thrombophilia. She was monitored. MFM was consulted. Recommended magnesium sulfate for neuroprotection and Celestone with DVT  prophylaxis. Twin A was vertex. Twin B was transverse, measuring  appropriate for gestational age. The following day, she had no  complaints and she was monitored. MFM continued to follow, recommended  to continue monitoring. She was hydrated with IV fluids. The following  day, she had no complaints. Fetal fibronectin noted to be negative and  she was discharged home later that day per MFM with precautions and  instructions to follow up in one week. PHYSICIANS FOLLOWING THE PATIENT:  Abbey Savage MD, and Dr. Gibson . LEVEL OF FUNCTION:  Stable. CAREGIVERS:  Self and family. SPECIALS PROBLEMS:  _____  labor. Negative fetal fibronectin. DISCHARGE INSTRUCTIONS:  To follow up with MFM. Precautions were given.         Jodi Montelongo MD    D: 2021 21:52:34       T: 2021 1:49:35     [email or web address]  Job#: [id number]     Doc#: [id number]    CC:

## 2021-07-13 ENCOUNTER — ANCILLARY PROCEDURE (OUTPATIENT)
Dept: OBGYN CLINIC | Age: 26
End: 2021-07-13
Payer: COMMERCIAL

## 2021-07-13 ENCOUNTER — ROUTINE PRENATAL (OUTPATIENT)
Dept: OBGYN CLINIC | Age: 26
End: 2021-07-13
Payer: COMMERCIAL

## 2021-07-13 VITALS
DIASTOLIC BLOOD PRESSURE: 67 MMHG | WEIGHT: 253.6 LBS | HEIGHT: 64 IN | HEART RATE: 99 BPM | SYSTOLIC BLOOD PRESSURE: 109 MMHG | BODY MASS INDEX: 43.29 KG/M2

## 2021-07-13 DIAGNOSIS — D68.9 BLOOD COAGULATION DISORDER COMPLICATING PREGNANCY, THIRD TRIMESTER (HCC): ICD-10-CM

## 2021-07-13 DIAGNOSIS — Z3A.32 32 WEEKS GESTATION OF PREGNANCY: ICD-10-CM

## 2021-07-13 DIAGNOSIS — O99.213 MATERNAL MORBID OBESITY IN THIRD TRIMESTER, ANTEPARTUM (HCC): ICD-10-CM

## 2021-07-13 DIAGNOSIS — Z79.01 LONG TERM CURRENT USE OF ANTICOAGULANT THERAPY: ICD-10-CM

## 2021-07-13 DIAGNOSIS — O99.113 BLOOD COAGULATION DISORDER COMPLICATING PREGNANCY, THIRD TRIMESTER (HCC): ICD-10-CM

## 2021-07-13 DIAGNOSIS — O35.2XX0 HEREDITARY FAMILIAL DISEASE AFFECTING MANAGEMENT OF MOTHER AND POSSIBLY AFFECTING FETUS, ANTEPARTUM, SINGLE OR UNSPECIFIED FETUS: ICD-10-CM

## 2021-07-13 DIAGNOSIS — O30.043 DICHORIONIC DIAMNIOTIC TWIN PREGNANCY IN THIRD TRIMESTER: Primary | ICD-10-CM

## 2021-07-13 DIAGNOSIS — O36.63X2: ICD-10-CM

## 2021-07-13 DIAGNOSIS — O40.3XX2 POLYHYDRAMNIOS, THIRD TRIMESTER, FETUS 2: ICD-10-CM

## 2021-07-13 DIAGNOSIS — E66.01 MATERNAL MORBID OBESITY IN THIRD TRIMESTER, ANTEPARTUM (HCC): ICD-10-CM

## 2021-07-13 LAB
GLUCOSE URINE, POC: NEGATIVE
PROTEIN UA: NEGATIVE

## 2021-07-13 PROCEDURE — 76818 FETAL BIOPHYS PROFILE W/NST: CPT | Performed by: OBSTETRICS & GYNECOLOGY

## 2021-07-13 PROCEDURE — G8427 DOCREV CUR MEDS BY ELIG CLIN: HCPCS | Performed by: OBSTETRICS & GYNECOLOGY

## 2021-07-13 PROCEDURE — 99212 OFFICE O/P EST SF 10 MIN: CPT | Performed by: OBSTETRICS & GYNECOLOGY

## 2021-07-13 PROCEDURE — 81002 URINALYSIS NONAUTO W/O SCOPE: CPT | Performed by: OBSTETRICS & GYNECOLOGY

## 2021-07-13 PROCEDURE — 76816 OB US FOLLOW-UP PER FETUS: CPT | Performed by: OBSTETRICS & GYNECOLOGY

## 2021-07-13 PROCEDURE — 1111F DSCHRG MED/CURRENT MED MERGE: CPT | Performed by: OBSTETRICS & GYNECOLOGY

## 2021-07-13 PROCEDURE — G8419 CALC BMI OUT NRM PARAM NOF/U: HCPCS | Performed by: OBSTETRICS & GYNECOLOGY

## 2021-07-13 PROCEDURE — 76820 UMBILICAL ARTERY ECHO: CPT | Performed by: OBSTETRICS & GYNECOLOGY

## 2021-07-13 PROCEDURE — 99213 OFFICE O/P EST LOW 20 MIN: CPT | Performed by: OBSTETRICS & GYNECOLOGY

## 2021-07-13 PROCEDURE — 1036F TOBACCO NON-USER: CPT | Performed by: OBSTETRICS & GYNECOLOGY

## 2021-07-13 NOTE — LETTER
NON STRESS TEST INTERPRETATION    21    RE:  Redge Camera  : 1995  AGE: 31 y.o. GESTATIONAL AGE:  32w2d       DIAGNOSIS:      Dichorionic diamniotic intrauterine twin gestation. Maternal obesity. Complex thrombophilia. INDICATION:        Twin gestation Dichorionic diamniotic.     TIME ON:  12:28 PM      TIME OFF:  12:51 PM      RESULT:   Twin A :    REACTIVE       Twin B :    REACTIVE       FHR Baseline Rate:   Twin A :     130 bpm       Twin B:     140 bpm    PERIODIC CHANGES:      Twin A :     Accelerations present, variability moderate, no decelerations noted  Twin B :          Accelerations present, variability moderate, no decelerations noted    COMMENTS:      She is to continue having NST's every 3-4 days, and BPP with umbilical artery doppler studies once per week        Annette Retana MD

## 2021-07-13 NOTE — PATIENT INSTRUCTIONS
if you are sick, not feeling well or have an infectious process going on please reschedule your appointment by calling [phone]. Also if any family members are not feeling well, please do not bring them to your appointment. We appreciate your cooperation. We are doing this in order to protect our pregnant mothers+ their babies. Call your primary obstetrician with bleeding, leaking of fluid, abdominal tenderness, headache, blurry vision, epigastric pain and increased urinary frequency. Any questions contact Julia at [phone]. If you are experiencing an emergency and need immediate help, call 911 or go to go emergency room or labor and delivery. Please arrive for your scheduled appointment at least 15 minutes early with your actual insurance card+ a photo ID. Also if you need any refills ordered or have questions, it may take up 48 hours to reply. Please allow ample time for your refills. Call me when you use last refill. Thank you for your cooperation. You might be having an NST at your next appt. Please eat a large snack or breakfast before coming to office. Thank youDo kick counts after dinner. Call your primary obstetrician if less than 10 kicks in 2 hours after dinner. Call your primary obstetrician with bleeding, leaking of fluid, abdominal tenderness, headache, blurry vision, epigastric pain and increased urinary frequency. Patient Education        Weeks 32 to 29 of Your Pregnancy: Care Instructions  Overview     During the last few weeks of your pregnancy, you may have more aches and pains. It's important to rest when you can. Your growing baby is putting more pressure on your bladder. So you may need to urinate more often. Hemorrhoids are also common. These are painful, itchy veins in the rectal area. You may want to talk with your doctor about banking your baby's umbilical cord blood. This is the blood left in the cord after birth.  If you want to save this blood, you must arrange it ahead of time. You can't decide at the last minute. If you haven't already had the Tdap shot during this pregnancy, talk to your doctor about getting it. It will help protect your  against pertussis infection. Follow-up care is a key part of your treatment and safety. Be sure to make and go to all appointments, and call your doctor if you are having problems. It's also a good idea to know your test results and keep a list of the medicines you take. How can you care for yourself at home? Ease hemorrhoids  · Get more liquids, fruits, vegetables, and fiber in your diet. This will help keep your stools soft. · Avoid sitting for too long. Lie on your left side several times a day. · Clean yourself with soft, moist toilet paper. Or you can use witch hazel pads or personal hygiene pads. · If you are uncomfortable, try ice packs. Or you can sit in a warm sitz bath. Do these for 20 minutes at a time, as needed. · Use hydrocortisone cream for pain and itching. Two examples are Anusol and Preparation H Hydrocortisone. · Ask your doctor about taking an over-the-counter stool softener. Consider breastfeeding  · Experts recommend that women breastfeed for 1 year or longer. · Breast milk may help protect your child from some health problems.  babies are less likely than formula-fed babies to:  ? Get ear infections, colds, diarrhea, and pneumonia. ? Be obese or get diabetes later in life. · Women who breastfeed have less bleeding after the birth. Their uteruses also shrink back faster. · Some women who breastfeed lose weight faster. Making milk burns calories. · Breastfeeding can lower your risk of breast cancer, ovarian cancer, and osteoporosis. Decide about circumcision for boys  · As you make this decision, it may help to think about your personal, [employer]al, and family traditions. You get to decide if you will keep your son's penis natural or if he will be circumcised.   · If you decide that you would like to have your baby circumcised, talk with your doctor. You can share your concerns about pain. And you can discuss your preferences for anesthesia. Where can you learn more? Go to https://chpestar.healthLOC&ALL. org and sign in to your [employer] account. Enter J710 in the [facility] box to learn more about \"Weeks 32 to 34 of Your Pregnancy: Care Instructions. \"     If you do not have an account, please click on the \"Sign Up Now\" link. Current as of: October 8, 2020               Content Version: 12.9  © 7232-9303 [employer]. Care instructions adapted under license by [facility] ([facility]). If you have questions about a medical condition or this instruction, always ask your healthcare professional. [place] [address] any warranty or liability for your use of this information. Patient Education        Learning About When to Call Your Doctor During Pregnancy (After 20 Weeks)  Your Care Instructions  It's common to have concerns about what might be a problem during pregnancy. Although most pregnant women don't have any serious problems, it's important to know when to call your doctor if you have certain symptoms or signs of labor. These are general suggestions. Your doctor may give you some more information about when to call. When to call your doctor (after 20 weeks)  Call 911  anytime you think you may need emergency care. For example, call if:  · You have severe vaginal bleeding. · You have sudden, severe pain in your belly. · You passed out (lost consciousness). · You have a seizure. · You see or feel the umbilical cord. · You think you are about to deliver your baby and can't make it safely to the hospital.  Call your doctor now or seek immediate medical care if:  · You have vaginal bleeding. · You have belly pain. · You have a fever. · You have symptoms of preeclampsia, such as:  ? Sudden swelling of your face, hands, or feet.   ? New vision problems (such as dimness, blurring, or seeing spots). ? A severe headache. · You have a sudden release of fluid from your vagina. (You think your water broke.)  · You think that you may be in labor. This means that you've had at least 6 contractions in an hour. · You notice that your baby has stopped moving or is moving much less than normal.  · You have symptoms of a urinary tract infection. These may include:  ? Pain or burning when you urinate. ? A frequent need to urinate without being able to pass much urine. ? Pain in the flank, which is just below the rib cage and above the waist on either side of the back. ? Blood in your urine. Watch closely for changes in your health, and be sure to contact your doctor if:  · You have vaginal discharge that smells bad. · You have skin changes, such as:  ? A rash. ? Itching. ? Yellow color to your skin. · You have other concerns about your pregnancy. If you have labor signs at 37 weeks or more  If you have signs of labor at 37 weeks or more, your doctor may tell you to call when your labor becomes more active. Symptoms of active labor include:  · Contractions that are regular. · Contractions that are less than 5 minutes apart. · Contractions that are hard to talk through. Follow-up care is a key part of your treatment and safety. Be sure to make and go to all appointments, and call your doctor if you are having problems. It's also a good idea to know your test results and keep a list of the medicines you take. Where can you learn more? Go to https://SAICellie.itravel. org and sign in to your [employer] account. Enter  in the [facility] box to learn more about \"Learning About When to Call Your Doctor During Pregnancy (After 20 Weeks). \"     If you do not have an account, please click on the \"Sign Up Now\" link. Current as of: October 8, 2020               Content Version: 12.9  © 2516-2208 Healthwise, Incorporated.    Care instructions adapted under license by [facility] ([facility]). If you have questions about a medical condition or this instruction, always ask your healthcare professional. [address] any warranty or liability for your use of this information. Patient Education        Counting Your Baby's Kicks: Care Instructions  Your Care Instructions     Counting your baby's kicks is one way your doctor can tell that your baby is healthy. Most women--especially in a first pregnancy--feel their baby move for the first time between 16 and 22 weeks. The movement may feel like flutters rather than kicks. Your baby may move more at certain times of the day. When you are active, you may notice less kicking than when you are resting. At your prenatal visits, your doctor will ask whether the baby is active. In your last trimester, your doctor may ask you to count the number of times you feel your baby move. Follow-up care is a key part of your treatment and safety. Be sure to make and go to all appointments, and call your doctor if you are having problems. It's also a good idea to know your test results and keep a list of the medicines you take. How do you count fetal kicks? · A common method of checking your baby's movement is to count the number of kicks or moves you feel in 1 hour. Ten movements (such as kicks, flutters, or rolls) in 1 hour are normal. Some doctors suggest that you count in the morning until you get to 10 movements. Then you can quit for that day and start again the next day. · Pick your baby's most active time of day to count. This may be any time from morning to evening. · If you do not feel 10 movements in an hour, your baby may be sleeping. Wait for the next hour and count again. When should you call for help?    Call your doctor now or seek immediate medical care if:    · You noticed that your baby has stopped moving or is moving much less than normal.   Watch closely for changes in your health, and be sure to contact your doctor if you have any problems. Where can you learn more? Go to https://chpepiceweb.[email or web address] org and sign in to your [employer] account. Enter B361 in the [employer] box to learn more about \"Counting Your Baby's Kicks: Care Instructions. \"     If you do not have an account, please click on the \"Sign Up Now\" link. Current as of: October 8, 2020               Content Version: 12.9  © 2006-2021 [employer]. Care instructions adapted under license by [facility]"[employer]" [facility] ([facility]). If you have questions about a medical condition or this instruction, always ask your healthcare professional. [place] [address] any warranty or liability for your use of this information. Patient Education        Learning About Twin Pregnancy  What is different about a twin pregnancy? In many ways, a twin pregnancy is like a single-baby pregnancy. Healthy twins develop like a single baby does until the last trimester, when their growth slows down. Twins are usually born before the usual 40-week due date. For the mother, carrying twins can be more difficult than carrying a single baby. And her risks are higher for pregnancy problems. That's why keeping up with prenatal checks and tests is especially important. How do twins grow? Twins develop from embryos to babies like a single baby does. · By weeks 10 to 14 of your pregnancy, one or two placentas have formed inside your uterus. It is possible to hear your babies' heartbeats with a special ultrasound device. Your babies' eyes can move. Their arms and legs can bend. · Around weeks 14 to 18, hair is beginning to grow on your babies' heads. · By 18 to 22 weeks, your babies can now suck their thumbs. Around this time, you may start to feel your babies move. At first, this might feel like fluttering or \"butterflies. \"  · Around week 26, your babies can open and close their eyelids.  You may notice that they respond to the sound of your or your partner's voice. You may also notice that they do less turning and twisting and more squirming. · Up to 32 weeks, twins grow rapidly. By this point, they really start to look like babies, with hair and plump skin. · Around 32 to 34 weeks, twins' pace of growth slows down. Their lungs become more ready for breathing. This marks a stage when babies born early are less likely to have breathing problems after birth. What can you expect during a twin pregnancy? With a twin pregnancy, your body makes high levels of pregnancy hormones. So morning sickness may come on earlier and stronger than if you were carrying a single baby. You may also have earlier and more intense symptoms from pregnancy, like swelling, heartburn, leg cramps, bladder discomfort, and sleep problems. Your belly may grow bigger, and you may gain more weight, sooner. Talk to your doctor about how much you might expect to gain. When you're carrying twins, you and your babies may be tested and checked more than you would for a single-baby pregnancy. · At about 10 weeks, an ultrasound can show if the babies are growing in the same amniotic sac. If they each have their own sac and their own placenta, twins have the best chances of both growing well. · Between weeks 18 and 20, an ultrasound may be able to show the sex of your babies. · Later in your pregnancy, you will start to have checkups more often. This will be sooner than for a single-baby pregnancy. Twins tend to be born early, but 37 weeks is a goal when mother and babies are doing well. As you get closer to delivery time, your medical team will help you know what to expect and what to do. As questions come to mind, keep a list so you can remember to ask your doctor. Follow-up care is a key part of your treatment and safety. Be sure to make and go to all appointments, and call your doctor if you are having problems.  It's also a good idea to know your test results and keep a list of the medicines you take. Where can you learn more? Go to https://chpepiceweb.healthLEAD [email or web address] org and sign in to your [email or web address] account. Enter O739 in the [employer] box to learn more about \"Learning About Twin Pregnancy. \"     If you do not have an account, please click on the \"Sign Up Now\" link. Current as of: October 8, 2020               Content Version: 12.9  © 2006-2021 Healthwise, Incorporated. Care instructions adapted under license by [facility] ([facility]). If you have questions about a medical condition or this instruction, always ask your healthcare professional. [address] any warranty or liability for your use of this information.

## 2021-07-13 NOTE — PROGRESS NOTES
21     RE:  Beth Pimentel   : 1995   AGE: 31 y.o. REFERRING PHYSICIAN:                  Nancy Almonte MD    Dear   Mrs. Beth Pimentel a 31 y.o.  Ezra Garcia  is seen today on follow up in our office. REASON FOR APPOINTMENT:  · Follow-up on a pregnant patient with dichorionic diamniotic twin gestation and thrombophilia. MEDICATIONS:    · Prenatal Vitamins once per day   · Lovenox 40 mg subcutaneous once per day. · Vitamin D and calcium supplement. · Baby aspirin 81 mg once per day. · Iron supplement. INTERVAL HISTORY:  Mrs Beth Pimentel had an uneventful course of pregnancy since her last visit to our office. When seen today in our office she had no complaints. PHYSICAL EXAMINATION:  General Appearance:  Healthy looking, alert, no acute distress. Eyes:     No pallor, no icterus, no photophobia. Ears:     No ear drainage. Nose:     No nasal drainage, no paranasal sinus tenderness. Throat:   Mucosa moist, no oral thrush, no exudate. Neck:     No nuchal rigidity. Back:     No CVA tenderness. Abdomen:    Soft nontender. Extremities:    No pretibial pitting edema, no calf muscle tenderness. Skin:     No rashes, no lesions. BP: 109/67 Weight: 253 lb 9.6 oz (115 kg) Height: 5' 4\" (162.6 cm) Pulse: 99     Body mass index is 43.53 kg/m². Urine dipstick:  Glucose : Negative   Albumin:  Negative       An ultrasound evaluation was done in our office today. Please refer to the enclosed copy of the ultrasound report for further information. IMPRESSION:  1. A  32w2d  Dichorionic diamniotic intrauterine twin gestation. 2. Maternal obesity. 3. Polyhydramnios noted around baby B.  4. Excessive fetal growth (EFW: twin A at the 88th centile, twin B at [address])  5. Family history of skeletal dysplasia (2 siblings delivered at term,  neonatally). 6. Negative Personal History of DVT. 7. Tested because of family history of DVT and pulmonary embolism (her mother)  6.  Received Celestone toxic fetal lung maturity (2021). 9. Received magnesium sulfate for fetal neural protection (2021). 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 The size of each baby is appropriate for gestational age, adequate concordant growth is noted. No anomalies are noted. 2. Only genetic amniocentesis can rule out fetal chromosomal anomalies, normal ultrasound does not. 3. She is carrying dichorionic diamniotic twin gestation. There is no risk of fetal fetal transfusion. Twin gestation is associated with an increased risk of:  · Premature delivery. (Cervical length today was reassuring against risk of  delivery.)  · Disturbance in the growth of her babies (Excessive fetal growth is noted with EFW: twin A at the 88th centile, twin B at 80nd. ). Growth is concordant. 4. Obesity is assosiated with an increased risk of developing gestational diabetes, and disturbance in the growth of her baby, such as Large for dates and small for Dates. Gestational diabetes was ruled out with negative glucose tolerance test that was done in your office 2021.   5. She is to continue the thromboprophylactic treatment and continue the treatment with calcium and vitamin D supplementation. 6. She should continue the treatment with baby aspirin 81 mg once per day to delay onset and decrease likelihood of developing preeclampsia. 7. She gives history of having two siblings with skeletal dysplasia. The condition in this situation is genetically inherited (not a de Juanpablo mutation). I discussed with the patient the fact that the condition might be detected antenatally on genetic amniocentesis or on ultrasound done in the second trimester. I asked the patient if she is interested in prenatal genetic testing for the skelettal dysplasia. She declined it.  She said that she wouldn't consider having termination of pregnancy, if 1 or both of her babies have skeletal dysplasia, chromosomal abnormalities birth defects mental or motor retardation. 8. Polyhydramnios is noted on twin B with a deep pocket measuring 10.9 cm. Normal fluid noted around baby A.  9. Fetal well-being was confirmed today. The Biophysical profile score of 10/10 on each baby  is reassuring, and the umbilical artery Doppler studies are normal.  10. She should monitor fetal well-being at home by counting movements after dinner. If she perceives any decrease in movements,  she should call your office immediately. She is also to call, if she develops any headaches, blurred vision, abdominal pain, bleeding, or spotting, which are signs of preeclampsia. 11. She is to continue to follow with you in your office for ongoing obstetric care, and should be followed up in your office with nonstress test every Friday for remainder of pregnancy. 12. She should continue to be followed up in our [facility] office with biophysical profile umbilical artery Doppler once a week every Tuesday for remainder of pregnancy. Thank you again, doctor, for allowing us to be of service to your patient. If I can be of further assistance, please do not hesitate to call. Sincerely,        Amna Morfin M.D., [address] [facility]    Time spent on the encounter was over 15 minutes including counseling  coordination of care and direct face-to-face contact with the patient. Current encounter billing:  VT OFFICE OUTPATIENT VISIT 10-19 MINUTES [[id number]]  US OB 1 or More Fetus Limited [98889 Custom]  Biophysical profile [OBO12 Custom] x2  US Doppler Fetal Umbilical Artery [IIS4150 Custom]  x2  US OB Transvaginal [38926 Custom]    **This report has been created using voice recognition software.  It may contain minor errors     which are inherent in voice recognition technology**                  NON STRESS TEST INTERPRETATION    7/13/21    RE:

## 2021-07-13 NOTE — PROGRESS NOTES
Placed on moniter for NST of both babies  @ [phone]. Off moniter @ 1251. Nst completed on both babies . Baseline baby A 130  with moderate variabilty+ accelelrations. Baseline baby B 140 with moderate variability+ accelerastions.  Both reactive per dr Cathleen Joy

## 2021-07-13 NOTE — LETTER
21     RE:  Georgina Naranjo   : 1995   AGE: 31 y.o. REFERRING PHYSICIAN:                  Shyam Osorio MD    Dear   Mrs. Georgina Naranjo a 31 y.o.  [facility]  is seen today on follow up in our office. REASON FOR APPOINTMENT:  · Follow-up on a pregnant patient with dichorionic diamniotic twin gestation and thrombophilia. MEDICATIONS:    · Prenatal Vitamins once per day   · Lovenox 40 mg subcutaneous once per day. · Vitamin D and calcium supplement. · Baby aspirin 81 mg once per day. · Iron supplement. INTERVAL HISTORY:  Mrs Georgina Naranjo had an uneventful course of pregnancy since her last visit to our office. When seen today in our office she had no complaints. PHYSICAL EXAMINATION:  General Appearance:  Healthy looking, alert, no acute distress. Eyes:     No pallor, no icterus, no photophobia. Ears:     No ear drainage. Nose:     No nasal drainage, no paranasal sinus tenderness. Throat:   Mucosa moist, no oral thrush, no exudate. Neck:     No nuchal rigidity. Back:     No CVA tenderness. Abdomen:    Soft nontender. Extremities:    No pretibial pitting edema, no calf muscle tenderness. Skin:     No rashes, no lesions. BP: 109/67 Weight: 253 lb 9.6 oz (115 kg) Height: 5' 4\" (162.6 cm) Pulse: 99     Body mass index is 43.53 kg/m². Urine dipstick:  Glucose : Negative   Albumin:  Negative       An ultrasound evaluation was done in our office today. Please refer to the enclosed copy of the ultrasound report for further information. IMPRESSION:  1. A  32w2d  Dichorionic diamniotic intrauterine twin gestation. 2. Maternal obesity. 3. Polyhydramnios noted around baby B.  4. Excessive fetal growth (EFW: twin A at the 88th centile, twin B at [address])  5. Family history of skeletal dysplasia (2 siblings delivered at term,  neonatally). 6. Negative Personal History of DVT. 7. Tested because of family history of DVT and pulmonary embolism (her mother)  6.  Received Celestone toxic fetal lung maturity (2021). 9. Received magnesium sulfate for fetal neural protection (2021). 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 The size of each baby is appropriate for gestational age, adequate concordant growth is noted. No anomalies are noted. 2. Only genetic amniocentesis can rule out fetal chromosomal anomalies, normal ultrasound does not. 3. She is carrying dichorionic diamniotic twin gestation. There is no risk of fetal fetal transfusion. Twin gestation is associated with an increased risk of:  · Premature delivery. (Cervical length today was reassuring against risk of  delivery.)  · Disturbance in the growth of her babies (Excessive fetal growth is noted with EFW: twin A at the 88th centile, twin B at 80nd. ). Growth is concordant. 4. Obesity is assosiated with an increased risk of developing gestational diabetes, and disturbance in the growth of her baby, such as Large for dates and small for Dates. Gestational diabetes was ruled out with negative glucose tolerance test that was done in your office 2021.   5. She is to continue the thromboprophylactic treatment and continue the treatment with calcium and vitamin D supplementation. 6. She should continue the treatment with baby aspirin 81 mg once per day to delay onset and decrease likelihood of developing preeclampsia. 7. She gives history of having two siblings with skeletal dysplasia. The condition in this situation is genetically inherited (not a de Juanpablo mutation). I discussed with the patient the fact that the condition might be detected antenatally on genetic amniocentesis or on ultrasound done in the second trimester. I asked the patient if she is interested in prenatal genetic testing for the skelettal dysplasia. She declined it.  She said that she wouldn't consider having termination of pregnancy, if 1 or both of her babies have skeletal dysplasia, chromosomal abnormalities birth defects mental or motor retardation. 8. Polyhydramnios is noted on twin B with a deep pocket measuring 10.9 cm. Normal fluid noted around baby A.  9. Fetal well-being was confirmed today. The Biophysical profile score of 10/10 on each baby  is reassuring, and the umbilical artery Doppler studies are normal.  10. She should monitor fetal well-being at home by counting movements after dinner. If she perceives any decrease in movements,  she should call your office immediately. She is also to call, if she develops any headaches, blurred vision, abdominal pain, bleeding, or spotting, which are signs of preeclampsia. 11. She is to continue to follow with you in your office for ongoing obstetric care, and should be followed up in your office with nonstress test every Friday for remainder of pregnancy. 12. She should continue to be followed up in our [facility] office with biophysical profile umbilical artery Doppler once a week every Tuesday for remainder of pregnancy. Thank you again, doctor, for allowing us to be of service to your patient. If I can be of further assistance, please do not hesitate to call. Sincerely,        Netat Harrison M.D., [address] [facility]    Time spent on the encounter was over 15 minutes including counseling  coordination of care and direct face-to-face contact with the patient. Current encounter billing:  ID OFFICE OUTPATIENT VISIT 10-19 MINUTES [[id number]]  US OB 1 or More Fetus Limited [12387 Custom]  Biophysical profile [OBO12 Custom] x2  US Doppler Fetal Umbilical Artery [[id number] Custom]  x2  US OB Transvaginal [23032 Custom]    **This report has been created using voice recognition software.  It may contain minor errors     which are inherent in voice recognition technology**

## 2021-07-16 ENCOUNTER — ROUTINE PRENATAL (OUTPATIENT)
Dept: OBGYN CLINIC | Age: 26
End: 2021-07-16
Payer: COMMERCIAL

## 2021-07-16 VITALS
BODY MASS INDEX: 44.16 KG/M2 | DIASTOLIC BLOOD PRESSURE: 65 MMHG | WEIGHT: 257.25 LBS | SYSTOLIC BLOOD PRESSURE: 96 MMHG | HEART RATE: 105 BPM

## 2021-07-16 DIAGNOSIS — Z3A.32 32 WEEKS GESTATION OF PREGNANCY: ICD-10-CM

## 2021-07-16 LAB
GLUCOSE URINE, POC: NORMAL
PROTEIN UA: POSITIVE

## 2021-07-16 PROCEDURE — 81002 URINALYSIS NONAUTO W/O SCOPE: CPT | Performed by: OBSTETRICS & GYNECOLOGY

## 2021-07-16 PROCEDURE — 99213 OFFICE O/P EST LOW 20 MIN: CPT | Performed by: OBSTETRICS & GYNECOLOGY

## 2021-07-16 PROCEDURE — 59025 FETAL NON-STRESS TEST: CPT | Performed by: OBSTETRICS & GYNECOLOGY

## 2021-07-16 PROCEDURE — 99999 PR OFFICE/OUTPT VISIT,PROCEDURE ONLY: CPT | Performed by: OBSTETRICS & GYNECOLOGY

## 2021-07-16 NOTE — PROGRESS NOTES
States babies are active. Denies bleeding leakage of fluid or contractions. Doing daily kick count  Feet and ankles are swollen drinking fluids, denies HA or blurred vision      Call your obstetrician for:    Bleeding, leakage of fluid, abdominal tenderness, headaches, burred vision or  epigastric pain or decreased fetal movement or increased in urinary frequency.    Call if any questions or concerns

## 2021-07-16 NOTE — PROGRESS NOTES
Placed on moniter for NST of both babies @ [phone]. Off moniter @ 1015. Nst completed on both babies . Baseline baby A 140 with moderate variabilty+ accelelrations. Baseline baby B 135 with moderate variability + accelelrations.  Both  reactive per dr Parth Ogden

## 2021-07-16 NOTE — PATIENT INSTRUCTIONS
Please arrive for your scheduled appointment at least 15 minutes early with your actual insurance card+ a photo ID. Also if you need any refills ordered or have questions, it may take up 48 hours to reply. Please allow ample time for your refills. Call me when you use last refill. Thank you for your cooperation. Any questions contact Julia at [phone]. If you are experiencing an emergency and need immediate help, call 911 or go to go emergency room or labor and delivery. if you are sick, not feeling well or have an infectious process going on please reschedule your appointment by calling [phone]. Also if any family members are not feeling well, please do not bring them to your appointment. We appreciate your cooperation. We are doing this in order to protect our pregnant mothers+ their babies. Call your primary obstetrician with bleeding, leaking of fluid, abdominal tenderness, headache, blurry vision, epigastric pain and increased urinary frequency. Do kick counts after dinner. Call your primary obstetrician if less than 10 kicks in 2 hours after dinner. Call your primary obstetrician with bleeding, leaking of fluid, abdominal tenderness, headache, blurry vision, epigastric pain and increased urinary frequency. You might be having an NST at your next appt. Please eat a large snack or breakfast before coming to office. Thank you  Patient Education        Weeks 32 to 29 of Your Pregnancy: Care Instructions  Overview     During the last few weeks of your pregnancy, you may have more aches and pains. It's important to rest when you can. Your growing baby is putting more pressure on your bladder. So you may need to urinate more often. Hemorrhoids are also common. These are painful, itchy veins in the rectal area. You may want to talk with your doctor about banking your baby's umbilical cord blood. This is the blood left in the cord after birth.  If you want to save this blood, you must arrange it ahead of time. You can't decide at the last minute. If you haven't already had the Tdap shot during this pregnancy, talk to your doctor about getting it. It will help protect your  against pertussis infection. Follow-up care is a key part of your treatment and safety. Be sure to make and go to all appointments, and call your doctor if you are having problems. It's also a good idea to know your test results and keep a list of the medicines you take. How can you care for yourself at home? Ease hemorrhoids  · Get more liquids, fruits, vegetables, and fiber in your diet. This will help keep your stools soft. · Avoid sitting for too long. Lie on your left side several times a day. · Clean yourself with soft, moist toilet paper. Or you can use witch hazel pads or personal hygiene pads. · If you are uncomfortable, try ice packs. Or you can sit in a warm sitz bath. Do these for 20 minutes at a time, as needed. · Use hydrocortisone cream for pain and itching. Two examples are Anusol and Preparation H Hydrocortisone. · Ask your doctor about taking an over-the-counter stool softener. Consider breastfeeding  · Experts recommend that women breastfeed for 1 year or longer. · Breast milk may help protect your child from some health problems.  babies are less likely than formula-fed babies to:  ? Get ear infections, colds, diarrhea, and pneumonia. ? Be obese or get diabetes later in life. · Women who breastfeed have less bleeding after the birth. Their uteruses also shrink back faster. · Some women who breastfeed lose weight faster. Making milk burns calories. · Breastfeeding can lower your risk of breast cancer, ovarian cancer, and osteoporosis. Decide about circumcision for boys  · As you make this decision, it may help to think about your personal, [employer]ism, and family traditions. You get to decide if you will keep your son's penis natural or if he will be circumcised.   · If you decide that you would like to have your baby circumcised, talk with your doctor. You can share your concerns about pain. And you can discuss your preferences for anesthesia. Where can you learn more? Go to https://chpestar.healththeAudience. org and sign in to your NiftyThrifty account. Enter C294 in the [facility] box to learn more about \"Weeks 32 to 34 of Your Pregnancy: Care Instructions. \"     If you do not have an account, please click on the \"Sign Up Now\" link. Current as of: October 8, 2020               Content Version: 12.9  © 5419-4222 [employer]. Care instructions adapted under license by [address] If you have questions about a medical condition or this instruction, always ask your healthcare professional. [address] any warranty or liability for your use of this information. Patient Education        Learning About When to Call Your Doctor During Pregnancy (After 20 Weeks)  Your Care Instructions  It's common to have concerns about what might be a problem during pregnancy. Although most pregnant women don't have any serious problems, it's important to know when to call your doctor if you have certain symptoms or signs of labor. These are general suggestions. Your doctor may give you some more information about when to call. When to call your doctor (after 20 weeks)  Call 911  anytime you think you may need emergency care. For example, call if:  · You have severe vaginal bleeding. · You have sudden, severe pain in your belly. · You passed out (lost consciousness). · You have a seizure. · You see or feel the umbilical cord. · You think you are about to deliver your baby and can't make it safely to the hospital.  Call your doctor now or seek immediate medical care if:  · You have vaginal bleeding. · You have belly pain. · You have a fever. · You have symptoms of preeclampsia, such as:  ? Sudden swelling of your face, hands, or feet.   ? New vision problems (such as dimness, blurring, or seeing spots). ? A severe headache. · You have a sudden release of fluid from your vagina. (You think your water broke.)  · You think that you may be in labor. This means that you've had at least 6 contractions in an hour. · You notice that your baby has stopped moving or is moving much less than normal.  · You have symptoms of a urinary tract infection. These may include:  ? Pain or burning when you urinate. ? A frequent need to urinate without being able to pass much urine. ? Pain in the flank, which is just below the rib cage and above the waist on either side of the back. ? Blood in your urine. Watch closely for changes in your health, and be sure to contact your doctor if:  · You have vaginal discharge that smells bad. · You have skin changes, such as:  ? A rash. ? Itching. ? Yellow color to your skin. · You have other concerns about your pregnancy. If you have labor signs at 37 weeks or more  If you have signs of labor at 37 weeks or more, your doctor may tell you to call when your labor becomes more active. Symptoms of active labor include:  · Contractions that are regular. · Contractions that are less than 5 minutes apart. · Contractions that are hard to talk through. Follow-up care is a key part of your treatment and safety. Be sure to make and go to all appointments, and call your doctor if you are having problems. It's also a good idea to know your test results and keep a list of the medicines you take. Where can you learn more? Go to https://RF-iT Solutionsellie.Tribi Embedded Technologies Private. org and sign in to your [employer] account. Enter  in the [facility] box to learn more about \"Learning About When to Call Your Doctor During Pregnancy (After 20 Weeks). \"     If you do not have an account, please click on the \"Sign Up Now\" link. Current as of: October 8, 2020               Content Version: 12.9  © 2041-0730 Healthwise, Incorporated.    Care instructions adapted under license by [facility] ([facility]). If you have questions about a medical condition or this instruction, always ask your healthcare professional. [place] [address] any warranty or liability for your use of this information. Patient Education        Learning About Twin Pregnancy  What is different about a twin pregnancy? In many ways, a twin pregnancy is like a single-baby pregnancy. Healthy twins develop like a single baby does until the last trimester, when their growth slows down. Twins are usually born before the usual 40-week due date. For the mother, carrying twins can be more difficult than carrying a single baby. And her risks are higher for pregnancy problems. That's why keeping up with prenatal checks and tests is especially important. How do twins grow? Twins develop from embryos to babies like a single baby does. · By weeks 10 to 14 of your pregnancy, one or two placentas have formed inside your uterus. It is possible to hear your babies' heartbeats with a special ultrasound device. Your babies' eyes can move. Their arms and legs can bend. · Around weeks 14 to 18, hair is beginning to grow on your babies' heads. · By 18 to 22 weeks, your babies can now suck their thumbs. Around this time, you may start to feel your babies move. At first, this might feel like fluttering or \"butterflies. \"  · Around week 26, your babies can open and close their eyelids. You may notice that they respond to the sound of your or your partner's voice. You may also notice that they do less turning and twisting and more squirming. · Up to 32 weeks, twins grow rapidly. By this point, they really start to look like babies, with hair and plump skin. · Around 32 to 34 weeks, twins' pace of growth slows down. Their lungs become more ready for breathing. This marks a stage when babies born early are less likely to have breathing problems after birth. What can you expect during a twin pregnancy?   With a twin pregnancy, your body makes high levels of pregnancy hormones. So morning sickness may come on earlier and stronger than if you were carrying a single baby. You may also have earlier and more intense symptoms from pregnancy, like swelling, heartburn, leg cramps, bladder discomfort, and sleep problems. Your belly may grow bigger, and you may gain more weight, sooner. Talk to your doctor about how much you might expect to gain. When you're carrying twins, you and your babies may be tested and checked more than you would for a single-baby pregnancy. · At about 10 weeks, an ultrasound can show if the babies are growing in the same amniotic sac. If they each have their own sac and their own placenta, twins have the best chances of both growing well. · Between weeks 18 and 20, an ultrasound may be able to show the sex of your babies. · Later in your pregnancy, you will start to have checkups more often. This will be sooner than for a single-baby pregnancy. Twins tend to be born early, but 37 weeks is a goal when mother and babies are doing well. As you get closer to delivery time, your medical team will help you know what to expect and what to do. As questions come to mind, keep a list so you can remember to ask your doctor. Follow-up care is a key part of your treatment and safety. Be sure to make and go to all appointments, and call your doctor if you are having problems. It's also a good idea to know your test results and keep a list of the medicines you take. Where can you learn more? Go to https://Eosceneellie.[employer] Associates. org and sign in to your WorkVoices account. Enter R587 in the [facility] box to learn more about \"Learning About Twin Pregnancy. \"     If you do not have an account, please click on the \"Sign Up Now\" link. Current as of: October 8, 2020               Content Version: 12.9  © 6201-5248 Healthwise, Incorporated. Care instructions adapted under license by [facility] ([place] [facility]).  If you have questions about a medical condition or this instruction, always ask your healthcare professional. [address] any warranty or liability for your use of this information. Patient Education        Counting Your Baby's Kicks: Care Instructions  Your Care Instructions     Counting your baby's kicks is one way your doctor can tell that your baby is healthy. Most women--especially in a first pregnancy--feel their baby move for the first time between 16 and 22 weeks. The movement may feel like flutters rather than kicks. Your baby may move more at certain times of the day. When you are active, you may notice less kicking than when you are resting. At your prenatal visits, your doctor will ask whether the baby is active. In your last trimester, your doctor may ask you to count the number of times you feel your baby move. Follow-up care is a key part of your treatment and safety. Be sure to make and go to all appointments, and call your doctor if you are having problems. It's also a good idea to know your test results and keep a list of the medicines you take. How do you count fetal kicks? · A common method of checking your baby's movement is to count the number of kicks or moves you feel in 1 hour. Ten movements (such as kicks, flutters, or rolls) in 1 hour are normal. Some doctors suggest that you count in the morning until you get to 10 movements. Then you can quit for that day and start again the next day. · Pick your baby's most active time of day to count. This may be any time from morning to evening. · If you do not feel 10 movements in an hour, your baby may be sleeping. Wait for the next hour and count again. When should you call for help?    Call your doctor now or seek immediate medical care if:    · You noticed that your baby has stopped moving or is moving much less than normal.   Watch closely for changes in your health, and be sure to contact your doctor if you have prescribed. Call your doctor if you think you are having a problem with your medicine. You will get more details on the specific medicines your doctor prescribes. · Stay at a healthy body weight. If you are overweight, the additional stress on joints can trigger bleeding. · Exercise safely. Avoid contact sports. Swim or walk to avoid excess pressure on your joints. Check with your doctor before doing activities that put you at high risk for falls, such as riding a bike. · Brush and floss your teeth daily. This may help you avoid problems that could lead to having a tooth pulled. · Avoid aspirin and nonsteroidal anti-inflammatory drugs (NSAIDs), such as ibuprofen (Advil, Motrin) or naproxen (Aleve). They can increase the chance of bleeding. · Take pain medicines exactly as directed. ? If the doctor gave you a prescription medicine for pain, take it as prescribed. ? If you are not taking a prescription pain medicine, ask your doctor if you can take an over-the-counter medicine. · Take care to prevent accidents at home:  ? Make sure rugs are tacked down so you do not slip. ? Keep furniture with sharp edges out of pathways. ? Use nonskid floor wax. ? Wipe up spills quickly. ? If you live in an area that gets snow and ice in the winter, sprinkle salt on steps and sidewalks. ? Avoid loose-fitting shoes. You might lose your balance and fall. · Wear medical alert jewelry that lists your clotting problem. You can buy this at most drugstores. When should you call for help? Call 911 anytime you think you may need emergency care. For example, call if:    · You passed out (lost consciousness).     · You have signs of severe bleeding, which includes:  ? You have a severe headache that is different from past headaches. ? You vomit blood or what looks like coffee grounds. ? Your stools are maroon or very bloody.    Call your doctor now or seek immediate medical care if:    · You are dizzy or lightheaded, or you feel like you may faint.     · You have abnormal bleeding, such as:  ? A nosebleed that you can't easily stop. This means it's still bleeding after pressure has been applied 3 times for 10 minutes each time (30 minutes total). ? Your stools are black and look like tar, or they have streaks of blood. ? You have blood in your urine. ? You have joint pain. ? You have bruises or blood spots under your skin. Watch closely for changes in your health, and be sure to contact your doctor if:    · You do not get better as expected. Where can you learn more? Go to https://Pudding MediapeOptiWi-fieweb.Nomad Mobile Guides. org and sign in to your [employer] account. Enter U491 in the [employer] box to learn more about \"Clotting Factor Deficiencies: Care Instructions. \"     If you do not have an account, please click on the \"Sign Up Now\" link. Current as of: September 23, 2020               Content Version: 12.9  © 2006-2021 Healthwise, Incorporated. Care instructions adapted under license by [facility] ([place] [facility]). If you have questions about a medical condition or this instruction, always ask your healthcare professional. [place] [address] any warranty or liability for your use of this information.

## 2021-07-20 ENCOUNTER — ANCILLARY PROCEDURE (OUTPATIENT)
Dept: OBGYN CLINIC | Age: 26
End: 2021-07-20
Payer: COMMERCIAL

## 2021-07-20 ENCOUNTER — ROUTINE PRENATAL (OUTPATIENT)
Dept: OBGYN CLINIC | Age: 26
End: 2021-07-20
Payer: COMMERCIAL

## 2021-07-20 VITALS
HEART RATE: 106 BPM | DIASTOLIC BLOOD PRESSURE: 65 MMHG | BODY MASS INDEX: 44.22 KG/M2 | WEIGHT: 259 LBS | HEIGHT: 64 IN | SYSTOLIC BLOOD PRESSURE: 102 MMHG

## 2021-07-20 DIAGNOSIS — O40.3XX2 POLYHYDRAMNIOS, THIRD TRIMESTER, FETUS 2: ICD-10-CM

## 2021-07-20 DIAGNOSIS — O35.2XX0 HEREDITARY FAMILIAL DISEASE AFFECTING MANAGEMENT OF MOTHER AND POSSIBLY AFFECTING FETUS, ANTEPARTUM, SINGLE OR UNSPECIFIED FETUS: ICD-10-CM

## 2021-07-20 DIAGNOSIS — O99.213 MATERNAL MORBID OBESITY IN THIRD TRIMESTER, ANTEPARTUM (HCC): ICD-10-CM

## 2021-07-20 DIAGNOSIS — O36.63X2: ICD-10-CM

## 2021-07-20 DIAGNOSIS — E66.01 MATERNAL MORBID OBESITY IN THIRD TRIMESTER, ANTEPARTUM (HCC): ICD-10-CM

## 2021-07-20 DIAGNOSIS — O30.043 DICHORIONIC DIAMNIOTIC TWIN PREGNANCY IN THIRD TRIMESTER: Primary | ICD-10-CM

## 2021-07-20 DIAGNOSIS — Z3A.33 33 WEEKS GESTATION OF PREGNANCY: ICD-10-CM

## 2021-07-20 DIAGNOSIS — Z79.01 LONG TERM CURRENT USE OF ANTICOAGULANT THERAPY: ICD-10-CM

## 2021-07-20 DIAGNOSIS — O99.113 BLOOD COAGULATION DISORDER COMPLICATING PREGNANCY, THIRD TRIMESTER (HCC): ICD-10-CM

## 2021-07-20 DIAGNOSIS — D68.9 BLOOD COAGULATION DISORDER COMPLICATING PREGNANCY, THIRD TRIMESTER (HCC): ICD-10-CM

## 2021-07-20 LAB
GLUCOSE URINE, POC: NEGATIVE
PROTEIN UA: NEGATIVE

## 2021-07-20 PROCEDURE — G8427 DOCREV CUR MEDS BY ELIG CLIN: HCPCS | Performed by: OBSTETRICS & GYNECOLOGY

## 2021-07-20 PROCEDURE — 99213 OFFICE O/P EST LOW 20 MIN: CPT | Performed by: OBSTETRICS & GYNECOLOGY

## 2021-07-20 PROCEDURE — G8419 CALC BMI OUT NRM PARAM NOF/U: HCPCS | Performed by: OBSTETRICS & GYNECOLOGY

## 2021-07-20 PROCEDURE — 1036F TOBACCO NON-USER: CPT | Performed by: OBSTETRICS & GYNECOLOGY

## 2021-07-20 PROCEDURE — 76815 OB US LIMITED FETUS(S): CPT | Performed by: OBSTETRICS & GYNECOLOGY

## 2021-07-20 PROCEDURE — 1111F DSCHRG MED/CURRENT MED MERGE: CPT | Performed by: OBSTETRICS & GYNECOLOGY

## 2021-07-20 PROCEDURE — 76820 UMBILICAL ARTERY ECHO: CPT | Performed by: OBSTETRICS & GYNECOLOGY

## 2021-07-20 PROCEDURE — 81002 URINALYSIS NONAUTO W/O SCOPE: CPT | Performed by: OBSTETRICS & GYNECOLOGY

## 2021-07-20 PROCEDURE — 76818 FETAL BIOPHYS PROFILE W/NST: CPT | Performed by: OBSTETRICS & GYNECOLOGY

## 2021-07-20 PROCEDURE — 99212 OFFICE O/P EST SF 10 MIN: CPT | Performed by: OBSTETRICS & GYNECOLOGY

## 2021-07-20 NOTE — LETTER
NON STRESS TEST INTERPRETATION    21    RE:  Jeffry Servin   : 1995   AGE: 31 y.o. GESTATIONAL AGE:  33w2d    DIAGNOSIS:      Dichorionic diamniotic intrauterine twin gestation. Maternal obesity. Complex thrombophilia. INDICATION:        Twin gestation Dichorionic diamniotic.     TIME ON:  9:12 AM      TIME OFF:  9:34 AM      RESULT:   Twin A :    REACTIVE       Twin B :    REACTIVE       FHR Baseline Rate:   Twin A :     130 bpm       Twin B:     140 bpm    PERIODIC CHANGES:      Twin A :     Accelerations present, variability moderate, no decelerations noted  Twin B :          Accelerations present, variability moderate, no decelerations noted    COMMENTS:      She is to continue having NST's every 3-4 days, and BPP with umbilical artery doppler studies once per week        Rianna Khanna MD

## 2021-07-20 NOTE — PROGRESS NOTES
Placed on moniter for NST of bothj babies. Off moniter @ [id number]. Nst completed on both babies . Baseline baby A 130  with moderate variabilty+ accelelrations. Baseline baby B 140 with moderate variability+ accelelrations.  both reactive per dr Radha Ray

## 2021-07-20 NOTE — PATIENT INSTRUCTIONS
if you are sick, not feeling well or have an infectious process going on please reschedule your appointment by calling [phone]. Also if any family members are not feeling well, please do not bring them to your appointment. We appreciate your cooperation. We are doing this in order to protect our pregnant mothers+ their babies. Call your primary obstetrician with bleeding, leaking of fluid, abdominal tenderness, headache, blurry vision, epigastric pain and increased urinary frequency. Any questions contact Neisha Mayes at [phone]. If you are experiencing an emergency and need immediate help, call 911 or go to go emergency room or labor and delivery. Please arrive for your scheduled appointment at least 15 minutes early with your actual insurance card+ a photo ID. Also if you need any refills ordered or have questions, it may take up 48 hours to reply. Please allow ample time for your refills. Call me when you use last refill. Thank you for your cooperation. You might be having an NST at your next appt. Please eat a large snack or breakfast before coming to office. Thank youDo kick counts after dinner. Call your primary obstetrician if less than 10 kicks in 2 hours after dinner. Call your primary obstetrician with bleeding, leaking of fluid, abdominal tenderness, headache, blurry vision, epigastric pain and increased urinary frequency. Patient Education        Weeks 32 to 29 of Your Pregnancy: Care Instructions  Overview     During the last few weeks of your pregnancy, you may have more aches and pains. It's important to rest when you can. Your growing baby is putting more pressure on your bladder. So you may need to urinate more often. Hemorrhoids are also common. These are painful, itchy veins in the rectal area. You may want to talk with your doctor about banking your baby's umbilical cord blood. This is the blood left in the cord after birth.  If you want to save this blood, you must arrange it ahead of time. You can't decide at the last minute. If you haven't already had the Tdap shot during this pregnancy, talk to your doctor about getting it. It will help protect your  against pertussis infection. Follow-up care is a key part of your treatment and safety. Be sure to make and go to all appointments, and call your doctor if you are having problems. It's also a good idea to know your test results and keep a list of the medicines you take. How can you care for yourself at home? Ease hemorrhoids  · Get more liquids, fruits, vegetables, and fiber in your diet. This will help keep your stools soft. · Avoid sitting for too long. Lie on your left side several times a day. · Clean yourself with soft, moist toilet paper. Or you can use witch hazel pads or personal hygiene pads. · If you are uncomfortable, try ice packs. Or you can sit in a warm sitz bath. Do these for 20 minutes at a time, as needed. · Use hydrocortisone cream for pain and itching. Two examples are Anusol and Preparation H Hydrocortisone. · Ask your doctor about taking an over-the-counter stool softener. Consider breastfeeding  · Experts recommend that women breastfeed for 1 year or longer. · Breast milk may help protect your child from some health problems.  babies are less likely than formula-fed babies to:  ? Get ear infections, colds, diarrhea, and pneumonia. ? Be obese or get diabetes later in life. · Women who breastfeed have less bleeding after the birth. Their uteruses also shrink back faster. · Some women who breastfeed lose weight faster. Making milk burns calories. · Breastfeeding can lower your risk of breast cancer, ovarian cancer, and osteoporosis. Decide about circumcision for boys  · As you make this decision, it may help to think about your personal, Shinto, and family traditions. You get to decide if you will keep your son's penis natural or if he will be circumcised.   · If you decide that you would like to have your baby circumcised, talk with your doctor. You can share your concerns about pain. And you can discuss your preferences for anesthesia. Where can you learn more? Go to https://Equities.compesummerActiveReplay.healthKilimanjaro Energy. org and sign in to your [employer] account. Enter N276 in the [facility] box to learn more about \"Weeks 32 to 34 of Your Pregnancy: Care Instructions. \"     If you do not have an account, please click on the \"Sign Up Now\" link. Current as of: October 8, 2020               Content Version: 12.9  © 2906-4363 [employer]. Care instructions adapted under license by [facility] ([facility]). If you have questions about a medical condition or this instruction, always ask your healthcare professional. [place] [address] any warranty or liability for your use of this information. Patient Education        Learning About When to Call Your Doctor During Pregnancy (After 20 Weeks)  Your Care Instructions  It's common to have concerns about what might be a problem during pregnancy. Although most pregnant women don't have any serious problems, it's important to know when to call your doctor if you have certain symptoms or signs of labor. These are general suggestions. Your doctor may give you some more information about when to call. When to call your doctor (after 20 weeks)  Call 911  anytime you think you may need emergency care. For example, call if:  · You have severe vaginal bleeding. · You have sudden, severe pain in your belly. · You passed out (lost consciousness). · You have a seizure. · You see or feel the umbilical cord. · You think you are about to deliver your baby and can't make it safely to the hospital.  Call your doctor now or seek immediate medical care if:  · You have vaginal bleeding. · You have belly pain. · You have a fever. · You have symptoms of preeclampsia, such as:  ? Sudden swelling of your face, hands, or feet.   ? New vision problems (such as dimness, blurring, or seeing spots). ? A severe headache. · You have a sudden release of fluid from your vagina. (You think your water broke.)  · You think that you may be in labor. This means that you've had at least 6 contractions in an hour. · You notice that your baby has stopped moving or is moving much less than normal.  · You have symptoms of a urinary tract infection. These may include:  ? Pain or burning when you urinate. ? A frequent need to urinate without being able to pass much urine. ? Pain in the flank, which is just below the rib cage and above the waist on either side of the back. ? Blood in your urine. Watch closely for changes in your health, and be sure to contact your doctor if:  · You have vaginal discharge that smells bad. · You have skin changes, such as:  ? A rash. ? Itching. ? Yellow color to your skin. · You have other concerns about your pregnancy. If you have labor signs at 37 weeks or more  If you have signs of labor at 37 weeks or more, your doctor may tell you to call when your labor becomes more active. Symptoms of active labor include:  · Contractions that are regular. · Contractions that are less than 5 minutes apart. · Contractions that are hard to talk through. Follow-up care is a key part of your treatment and safety. Be sure to make and go to all appointments, and call your doctor if you are having problems. It's also a good idea to know your test results and keep a list of the medicines you take. Where can you learn more? Go to https://Arava Power Companyellie.Mediaocean. org and sign in to your [employer] account. Enter  in the [facility] box to learn more about \"Learning About When to Call Your Doctor During Pregnancy (After 20 Weeks). \"     If you do not have an account, please click on the \"Sign Up Now\" link. Current as of: October 8, 2020               Content Version: 12.9  © 7497-9831 Healthwise, Incorporated.    Care instructions adapted under license by [facility] ([facility]). If you have questions about a medical condition or this instruction, always ask your healthcare professional. [address] any warranty or liability for your use of this information. Patient Education        Counting Your Baby's Kicks: Care Instructions  Your Care Instructions     Counting your baby's kicks is one way your doctor can tell that your baby is healthy. Most women--especially in a first pregnancy--feel their baby move for the first time between 16 and 22 weeks. The movement may feel like flutters rather than kicks. Your baby may move more at certain times of the day. When you are active, you may notice less kicking than when you are resting. At your prenatal visits, your doctor will ask whether the baby is active. In your last trimester, your doctor may ask you to count the number of times you feel your baby move. Follow-up care is a key part of your treatment and safety. Be sure to make and go to all appointments, and call your doctor if you are having problems. It's also a good idea to know your test results and keep a list of the medicines you take. How do you count fetal kicks? · A common method of checking your baby's movement is to count the number of kicks or moves you feel in 1 hour. Ten movements (such as kicks, flutters, or rolls) in 1 hour are normal. Some doctors suggest that you count in the morning until you get to 10 movements. Then you can quit for that day and start again the next day. · Pick your baby's most active time of day to count. This may be any time from morning to evening. · If you do not feel 10 movements in an hour, your baby may be sleeping. Wait for the next hour and count again. When should you call for help?    Call your doctor now or seek immediate medical care if:    · You noticed that your baby has stopped moving or is moving much less than normal.   Watch closely for changes in your health, and be sure to contact your doctor if you have any problems. Where can you learn more? Go to https://chpepiceweb.NeurogesX. org and sign in to your [employer] account. Enter T791 in the [employer] box to learn more about \"Counting Your Baby's Kicks: Care Instructions. \"     If you do not have an account, please click on the \"Sign Up Now\" link. Current as of: October 8, 2020               Content Version: 12.9  © 2006-2021 [employer]. Care instructions adapted under license by [facility] ([facility]). If you have questions about a medical condition or this instruction, always ask your healthcare professional. [place] [address] any warranty or liability for your use of this information. Patient Education        Learning About Twin Pregnancy  What is different about a twin pregnancy? In many ways, a twin pregnancy is like a single-baby pregnancy. Healthy twins develop like a single baby does until the last trimester, when their growth slows down. Twins are usually born before the usual 40-week due date. For the mother, carrying twins can be more difficult than carrying a single baby. And her risks are higher for pregnancy problems. That's why keeping up with prenatal checks and tests is especially important. How do twins grow? Twins develop from embryos to babies like a single baby does. · By weeks 10 to 14 of your pregnancy, one or two placentas have formed inside your uterus. It is possible to hear your babies' heartbeats with a special ultrasound device. Your babies' eyes can move. Their arms and legs can bend. · Around weeks 14 to 18, hair is beginning to grow on your babies' heads. · By 18 to 22 weeks, your babies can now suck their thumbs. Around this time, you may start to feel your babies move. At first, this might feel like fluttering or \"butterflies. \"  · Around week 26, your babies can open and close their eyelids.  You may notice that they respond to the sound of your or your partner's voice. You may also notice that they do less turning and twisting and more squirming. · Up to 32 weeks, twins grow rapidly. By this point, they really start to look like babies, with hair and plump skin. · Around 32 to 34 weeks, twins' pace of growth slows down. Their lungs become more ready for breathing. This marks a stage when babies born early are less likely to have breathing problems after birth. What can you expect during a twin pregnancy? With a twin pregnancy, your body makes high levels of pregnancy hormones. So morning sickness may come on earlier and stronger than if you were carrying a single baby. You may also have earlier and more intense symptoms from pregnancy, like swelling, heartburn, leg cramps, bladder discomfort, and sleep problems. Your belly may grow bigger, and you may gain more weight, sooner. Talk to your doctor about how much you might expect to gain. When you're carrying twins, you and your babies may be tested and checked more than you would for a single-baby pregnancy. · At about 10 weeks, an ultrasound can show if the babies are growing in the same amniotic sac. If they each have their own sac and their own placenta, twins have the best chances of both growing well. · Between weeks 18 and 20, an ultrasound may be able to show the sex of your babies. · Later in your pregnancy, you will start to have checkups more often. This will be sooner than for a single-baby pregnancy. Twins tend to be born early, but 37 weeks is a goal when mother and babies are doing well. As you get closer to delivery time, your medical team will help you know what to expect and what to do. As questions come to mind, keep a list so you can remember to ask your doctor. Follow-up care is a key part of your treatment and safety. Be sure to make and go to all appointments, and call your doctor if you are having problems.  It's also a good idea to know your test results and keep a list of the medicines you take. Where can you learn more? Go to https://chpepiceweb.[email or web address] org and sign in to your Dr[employer] account. Enter J839 in the [facility] box to learn more about \"Learning About Twin Pregnancy. \"     If you do not have an account, please click on the \"Sign Up Now\" link. Current as of: October 8, 2020               Content Version: 12.9  © 2006-2021 Healthwise, Incorporated. Care instructions adapted under license by [facility] ([facility]). If you have questions about a medical condition or this instruction, always ask your healthcare professional. [address] any warranty or liability for your use of this information.

## 2021-07-20 NOTE — PROGRESS NOTES
21     RE:  Chad Arzola   : 1995   AGE: 31 y.o. REFERRING PHYSICIAN:                  Anai Lewis MD    Dear   Mrs. Chad Arzola a 31 y.o.  Tony Guard  is seen today on follow up in our office. REASON FOR APPOINTMENT:  · Follow-up on a pregnant patient with dichorionic diamniotic twin gestation and thrombophilia. MEDICATIONS:    · Prenatal Vitamins once per day   · Lovenox 40 mg subcutaneous once per day. · Vitamin D and calcium supplement. · Baby aspirin 81 mg once per day. · Iron supplement. INTERVAL HISTORY:  Mrs Chad Arzola had an uneventful course of pregnancy since her last visit to our office. When seen today in our office she had no complaints. PHYSICAL EXAMINATION:  General Appearance:  Healthy looking, alert, no acute distress. Eyes:     No pallor, no icterus, no photophobia. Ears:     No ear drainage. Nose:     No nasal drainage, no paranasal sinus tenderness. Throat:   Mucosa moist, no oral thrush, no exudate. Neck:     No nuchal rigidity. Back:     No CVA tenderness. Abdomen:    Soft nontender. Extremities:    +1 pretibial pitting edema, no calf muscle tenderness. Skin:     No rashes, no lesions. BP: 102/65 Weight: 259 lb (117.5 kg) Height: 5' 4\" (162.6 cm) Pulse: 106     Body mass index is 44.46 kg/m². Urine dipstick:  Glucose : Negative   Albumin:  Negative       An ultrasound evaluation was done in our office today. Please refer to the enclosed copy of the ultrasound report for further information. IMPRESSION:  1. A  [id number] diamniotic intrauterine twin gestation. 2. Maternal obesity. 3. Polyhydramnios noted around baby B.  4. Excessive fetal growth (EFW: twin A at the 89th centile, twin B at [address])  5. Family history of skeletal dysplasia (2 siblings delivered at term,  neonatally). 6. Negative Personal History of DVT. 7. Tested because of family history of DVT and pulmonary embolism (her mother)  6.  Received Celestone toxic fetal lung maturity (2021). 9. Received magnesium sulfate for fetal neural protection (2021). 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 The size of each baby is appropriate for gestational age, adequate concordant growth is noted. No anomalies are noted. 2. Only genetic amniocentesis can rule out fetal chromosomal anomalies, normal ultrasound does not. 3. She is carrying dichorionic diamniotic twin gestation. There is no risk of fetal fetal transfusion. Twin gestation is associated with an increased risk of:  · Premature delivery. (Cervical length today was reassuring against risk of  delivery.)  · Disturbance in the growth of her babies (Excessive fetal growth is noted with EFW: twin A at the 89th centile, twin B at 80th). Growth is concordant. 4. Obesity is assosiated with an increased risk of developing gestational diabetes, and disturbance in the growth of her baby, such as Large for dates and small for Dates. Gestational diabetes was ruled out with negative glucose tolerance test that was done in your office 2021.   5. She is to continue the thromboprophylactic treatment and continue the treatment with calcium and vitamin D supplementation. 6. She should continue the treatment with baby aspirin 81 mg once per day to delay onset and decrease likelihood of developing preeclampsia. 7. She gives history of having two siblings with skeletal dysplasia. The condition in this situation is genetically inherited (not a de Juanpablo mutation). I discussed with the patient the fact that the condition might be detected antenatally on genetic amniocentesis or on ultrasound done in the second trimester. I asked the patient if she is interested in prenatal genetic testing for the skelettal dysplasia. She declined it.  She said that she wouldn't consider having termination of pregnancy, if 1 or both of her babies have skeletal dysplasia, chromosomal abnormalities birth defects mental or motor retardation. 8. Polyhydramnios is noted on twin B with a deep pocket measuring 7.2 cm. Normal fluid noted around baby A.  9. Fetal well-being was confirmed today. The Biophysical profile score of 10/10 on each baby  is reassuring, and the umbilical artery Doppler studies are normal.  10. She should monitor fetal well-being at home by counting movements after dinner. If she perceives any decrease in movements,  she should call your office immediately. She is also to call, if she develops any headaches, blurred vision, abdominal pain, bleeding, or spotting, which are signs of preeclampsia. 11. She is to continue to follow with you in your office for ongoing obstetric care, and should be followed up in your office with nonstress test every Friday for remainder of pregnancy. 12. She should continue to be followed up in our [facility] office with biophysical profile umbilical artery Doppler once a week every Tuesday for remainder of pregnancy. Thank you again, doctor, for allowing us to be of service to your patient. If I can be of further assistance, please do not hesitate to call. Sincerely,        Mireya Quintero M.D., [address] [facility]    Time spent on the encounter was over 15 minutes including counseling  coordination of care and direct face-to-face contact with the patient. Current encounter billing:  [place] OFFICE OUTPATIENT VISIT 10-19 MINUTES [[id number]]  US OB 1 or More Fetus Limited [18616 Custom]  Biophysical profile [OBO12 Custom] x2  US Doppler Fetal Umbilical Artery [[id number] Custom]  x2    **This report has been created using voice recognition software.  It may contain minor errors     which are inherent in voice recognition technology**                  NON STRESS TEST INTERPRETATION    21    RE:  Mis Lazo   : 1995 AGE: 31 y.o. GESTATIONAL AGE:  33w2d    DIAGNOSIS:      Dichorionic diamniotic intrauterine twin gestation. Maternal obesity. Complex thrombophilia. INDICATION:        Twin gestation Dichorionic diamniotic.     TIME ON:  9:12 AM      TIME OFF:  9:34 AM      RESULT:   Twin A :    REACTIVE       Twin B :    REACTIVE       FHR Baseline Rate:   Twin A :     130 bpm       Twin B:     140 bpm    PERIODIC CHANGES:      Twin A :     Accelerations present, variability moderate, no decelerations noted  Twin B :          Accelerations present, variability moderate, no decelerations noted    COMMENTS:      She is to continue having NST's every 3-4 days, and BPP with umbilical artery doppler studies once per week        Dalila Berger MD

## 2021-07-20 NOTE — LETTER
21     RE:  Sandy Vargas   : 1995   AGE: 31 y.o. REFERRING PHYSICIAN:                  Wesley Smith MD    Dear   Mrs. Sandy Vargas a 31 y.o.  Jaun Chavez  is seen today on follow up in our office. REASON FOR APPOINTMENT:  · Follow-up on a pregnant patient with dichorionic diamniotic twin gestation and thrombophilia. MEDICATIONS:    · Prenatal Vitamins once per day   · Lovenox 40 mg subcutaneous once per day. · Vitamin D and calcium supplement. · Baby aspirin 81 mg once per day. · Iron supplement. INTERVAL HISTORY:  Mrs Sandy Vargas had an uneventful course of pregnancy since her last visit to our office. When seen today in our office she had no complaints. PHYSICAL EXAMINATION:  General Appearance:  Healthy looking, alert, no acute distress. Eyes:     No pallor, no icterus, no photophobia. Ears:     No ear drainage. Nose:     No nasal drainage, no paranasal sinus tenderness. Throat:   Mucosa moist, no oral thrush, no exudate. Neck:     No nuchal rigidity. Back:     No CVA tenderness. Abdomen:    Soft nontender. Extremities:    +1 pretibial pitting edema, no calf muscle tenderness. Skin:     No rashes, no lesions. BP: 102/65 Weight: 259 lb (117.5 kg) Height: 5' 4\" (162.6 cm) Pulse: 106     Body mass index is 44.46 kg/m². Urine dipstick:  Glucose : Negative   Albumin:  Negative       An ultrasound evaluation was done in our office today. Please refer to the enclosed copy of the ultrasound report for further information. IMPRESSION:  1. A  [id number] diamniotic intrauterine twin gestation. 2. Maternal obesity. 3. Polyhydramnios noted around baby B.  4. Excessive fetal growth (EFW: twin A at the 89th centile, twin B at [address])  5. Family history of skeletal dysplasia (2 siblings delivered at term,  neonatally). 6. Negative Personal History of DVT. 7. Tested because of family history of DVT and pulmonary embolism (her mother)  6.  Received Celestone toxic fetal lung maturity (2021). 9. Received magnesium sulfate for fetal neural protection (2021). 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 The size of each baby is appropriate for gestational age, adequate concordant growth is noted. No anomalies are noted. 2. Only genetic amniocentesis can rule out fetal chromosomal anomalies, normal ultrasound does not. 3. She is carrying dichorionic diamniotic twin gestation. There is no risk of fetal fetal transfusion. Twin gestation is associated with an increased risk of:  · Premature delivery. (Cervical length today was reassuring against risk of  delivery.)  · Disturbance in the growth of her babies (Excessive fetal growth is noted with EFW: twin A at the 89th centile, twin B at 80th). Growth is concordant. 4. Obesity is assosiated with an increased risk of developing gestational diabetes, and disturbance in the growth of her baby, such as Large for dates and small for Dates. Gestational diabetes was ruled out with negative glucose tolerance test that was done in your office 2021.   5. She is to continue the thromboprophylactic treatment and continue the treatment with calcium and vitamin D supplementation. 6. She should continue the treatment with baby aspirin 81 mg once per day to delay onset and decrease likelihood of developing preeclampsia. 7. She gives history of having two siblings with skeletal dysplasia. The condition in this situation is genetically inherited (not a de Juanpablo mutation). I discussed with the patient the fact that the condition might be detected antenatally on genetic amniocentesis or on ultrasound done in the second trimester. I asked the patient if she is interested in prenatal genetic testing for the skelettal dysplasia. She declined it.  She said that she wouldn't consider having termination of pregnancy, if 1 or both of her babies have skeletal dysplasia, chromosomal abnormalities birth defects mental or motor retardation. 8. Polyhydramnios is noted on twin B with a deep pocket measuring 7.2 cm. Normal fluid noted around baby A.  9. Fetal well-being was confirmed today. The Biophysical profile score of 10/10 on each baby  is reassuring, and the umbilical artery Doppler studies are normal.  10. She should monitor fetal well-being at home by counting movements after dinner. If she perceives any decrease in movements,  she should call your office immediately. She is also to call, if she develops any headaches, blurred vision, abdominal pain, bleeding, or spotting, which are signs of preeclampsia. 11. She is to continue to follow with you in your office for ongoing obstetric care, and should be followed up in your office with nonstress test every Friday for remainder of pregnancy. 12. She should continue to be followed up in our [facility] office with biophysical profile umbilical artery Doppler once a week every Tuesday for remainder of pregnancy. Thank you again, doctor, for allowing us to be of service to your patient. If I can be of further assistance, please do not hesitate to call. Sincerely,        Manav Rose M.D., [address] [facility]    Time spent on the encounter was over 15 minutes including counseling  coordination of care and direct face-to-face contact with the patient. Current encounter billing:  [place] OFFICE OUTPATIENT VISIT 10-19 MINUTES [53501]  US OB 1 or More Fetus Limited [57510 Custom]  Biophysical profile [OBO12 Custom] x2  US Doppler Fetal Umbilical Artery [BQG4937 Custom]  x2    **This report has been created using voice recognition software.  It may contain minor errors     which are inherent in voice recognition technology**

## 2021-07-23 ENCOUNTER — ROUTINE PRENATAL (OUTPATIENT)
Dept: OBGYN CLINIC | Age: 26
End: 2021-07-23
Payer: COMMERCIAL

## 2021-07-23 VITALS
BODY MASS INDEX: 45.07 KG/M2 | SYSTOLIC BLOOD PRESSURE: 113 MMHG | HEIGHT: 64 IN | HEART RATE: 100 BPM | WEIGHT: 264 LBS | DIASTOLIC BLOOD PRESSURE: 72 MMHG

## 2021-07-23 DIAGNOSIS — Z3A.33 33 WEEKS GESTATION OF PREGNANCY: Primary | ICD-10-CM

## 2021-07-23 DIAGNOSIS — O30.043 DICHORIONIC DIAMNIOTIC TWIN PREGNANCY IN THIRD TRIMESTER: ICD-10-CM

## 2021-07-23 LAB
GLUCOSE URINE, POC: NEGATIVE
PROTEIN UA: NEGATIVE

## 2021-07-23 PROCEDURE — 81002 URINALYSIS NONAUTO W/O SCOPE: CPT | Performed by: OBSTETRICS & GYNECOLOGY

## 2021-07-23 PROCEDURE — 99213 OFFICE O/P EST LOW 20 MIN: CPT | Performed by: OBSTETRICS & GYNECOLOGY

## 2021-07-23 PROCEDURE — 99999 PR OFFICE/OUTPT VISIT,PROCEDURE ONLY: CPT | Performed by: OBSTETRICS & GYNECOLOGY

## 2021-07-23 PROCEDURE — 59025 FETAL NON-STRESS TEST: CPT | Performed by: OBSTETRICS & GYNECOLOGY

## 2021-07-23 NOTE — PATIENT INSTRUCTIONS
Patient Education        Weeks 32 to 29 of Your Pregnancy: Care Instructions  Overview     During the last few weeks of your pregnancy, you may have more aches and pains. It's important to rest when you can. Your growing baby is putting more pressure on your bladder. So you may need to urinate more often. Hemorrhoids are also common. These are painful, itchy veins in the rectal area. You may want to talk with your doctor about banking your baby's umbilical cord blood. This is the blood left in the cord after birth. If you want to save this blood, you must arrange it ahead of time. You can't decide at the last minute. If you haven't already had the Tdap shot during this pregnancy, talk to your doctor about getting it. It will help protect your  against pertussis infection. Follow-up care is a key part of your treatment and safety. Be sure to make and go to all appointments, and call your doctor if you are having problems. It's also a good idea to know your test results and keep a list of the medicines you take. How can you care for yourself at home? Ease hemorrhoids  · Get more liquids, fruits, vegetables, and fiber in your diet. This will help keep your stools soft. · Avoid sitting for too long. Lie on your left side several times a day. · Clean yourself with soft, moist toilet paper. Or you can use witch hazel pads or personal hygiene pads. · If you are uncomfortable, try ice packs. Or you can sit in a warm sitz bath. Do these for 20 minutes at a time, as needed. · Use hydrocortisone cream for pain and itching. Two examples are Anusol and Preparation H Hydrocortisone. · Ask your doctor about taking an over-the-counter stool softener. Consider breastfeeding  · Experts recommend that women breastfeed for 1 year or longer. · Breast milk may help protect your child from some health problems.   babies are less likely than formula-fed babies to:  ? Get ear infections, colds, diarrhea, and pneumonia. ? Be obese or get diabetes later in life. · Women who breastfeed have less bleeding after the birth. Their uteruses also shrink back faster. · Some women who breastfeed lose weight faster. Making milk burns calories. · Breastfeeding can lower your risk of breast cancer, ovarian cancer, and osteoporosis. Decide about circumcision for boys  · As you make this decision, it may help to think about your personal, Amish, and family traditions. You get to decide if you will keep your son's penis natural or if he will be circumcised. · If you decide that you would like to have your baby circumcised, talk with your doctor. You can share your concerns about pain. And you can discuss your preferences for anesthesia. Where can you learn more? Go to https://VCEpeOpSourceeb.Infinite Power Solutions. org and sign in to your Apothesource account. Enter M794 in the Affinegy box to learn more about \"Weeks 32 to 34 of Your Pregnancy: Care Instructions. \"     If you do not have an account, please click on the \"Sign Up Now\" link. Current as of: October 8, 2020               Content Version: 12.9  © 9925-9552 [employer]. Care instructions adapted under license by [facility] ([facility]). If you have questions about a medical condition or this instruction, always ask your healthcare professional. [place] [address] any warranty or liability for your use of this information. Patient Education        Learning About When to Call Your Doctor During Pregnancy (After 20 Weeks)  Your Care Instructions  It's common to have concerns about what might be a problem during pregnancy. Although most pregnant women don't have any serious problems, it's important to know when to call your doctor if you have certain symptoms or signs of labor. These are general suggestions. Your doctor may give you some more information about when to call.   When to call your doctor (after 20 weeks)  Call 911  anytime you think you may need emergency care. For example, call if:  · You have severe vaginal bleeding. · You have sudden, severe pain in your belly. · You passed out (lost consciousness). · You have a seizure. · You see or feel the umbilical cord. · You think you are about to deliver your baby and can't make it safely to the hospital.  Call your doctor now or seek immediate medical care if:  · You have vaginal bleeding. · You have belly pain. · You have a fever. · You have symptoms of preeclampsia, such as:  ? Sudden swelling of your face, hands, or feet. ? New vision problems (such as dimness, blurring, or seeing spots). ? A severe headache. · You have a sudden release of fluid from your vagina. (You think your water broke.)  · You think that you may be in labor. This means that you've had at least 6 contractions in an hour. · You notice that your baby has stopped moving or is moving much less than normal.  · You have symptoms of a urinary tract infection. These may include:  ? Pain or burning when you urinate. ? A frequent need to urinate without being able to pass much urine. ? Pain in the flank, which is just below the rib cage and above the waist on either side of the back. ? Blood in your urine. Watch closely for changes in your health, and be sure to contact your doctor if:  · You have vaginal discharge that smells bad. · You have skin changes, such as:  ? A rash. ? Itching. ? Yellow color to your skin. · You have other concerns about your pregnancy. If you have labor signs at 37 weeks or more  If you have signs of labor at 37 weeks or more, your doctor may tell you to call when your labor becomes more active. Symptoms of active labor include:  · Contractions that are regular. · Contractions that are less than 5 minutes apart. · Contractions that are hard to talk through. Follow-up care is a key part of your treatment and safety.  Be sure to make and go to all appointments, and call your doctor if you are having problems. It's also a good idea to know your test results and keep a list of the medicines you take. Where can you learn more? Go to https://chpepiceweb.Pixta. org and sign in to your Smart Voicemail account. Enter  in the CtraxChristianaCare box to learn more about \"Learning About When to Call Your Doctor During Pregnancy (After 20 Weeks). \"     If you do not have an account, please click on the \"Sign Up Now\" link. Current as of: October 8, 2020               Content Version: 12.9  © 1161-1071 [employer]. Care instructions adapted under license by [facility] ([facility]). If you have questions about a medical condition or this instruction, always ask your healthcare professional. [place] [address] any warranty or liability for your use of this information. Patient Education        Counting Your Baby's Kicks: Care Instructions  Your Care Instructions     Counting your baby's kicks is one way your doctor can tell that your baby is healthy. Most women--especially in a first pregnancy--feel their baby move for the first time between 16 and 22 weeks. The movement may feel like flutters rather than kicks. Your baby may move more at certain times of the day. When you are active, you may notice less kicking than when you are resting. At your prenatal visits, your doctor will ask whether the baby is active. In your last trimester, your doctor may ask you to count the number of times you feel your baby move. Follow-up care is a key part of your treatment and safety. Be sure to make and go to all appointments, and call your doctor if you are having problems. It's also a good idea to know your test results and keep a list of the medicines you take. How do you count fetal kicks? · A common method of checking your baby's movement is to count the number of kicks or moves you feel in 1 hour.  Ten movements (such as kicks, flutters, or rolls) in 1 hour are normal. Some doctors suggest that you count in the morning until you get to 10 movements. Then you can quit for that day and start again the next day. · Pick your baby's most active time of day to count. This may be any time from morning to evening. · If you do not feel 10 movements in an hour, your baby may be sleeping. Wait for the next hour and count again. When should you call for help? Call your doctor now or seek immediate medical care if:    · You noticed that your baby has stopped moving or is moving much less than normal.   Watch closely for changes in your health, and be sure to contact your doctor if you have any problems. Where can you learn more? Go to https://An Estuary.Advanced Inquiry Systems Inc.. org and sign in to your Ankeena Networks account. Enter I875 in the Intapp box to learn more about \"Counting Your Baby's Kicks: Care Instructions. \"     If you do not have an account, please click on the \"Sign Up Now\" link. Current as of: October 8, 2020               Content Version: 12.9  © 2006-2021 [employer]wise[employer]. Care instructions adapted under license by [facility] ([facility]). If you have questions about a medical condition or this instruction, always ask your healthcare professional. [address] any warranty or liability for your use of this information.

## 2021-07-23 NOTE — LETTER
[facility]aternal Fetal Medicine  [address] [place] [address]  [address] [address]  Phone: [phone]  Fax: [phone]           Almaz Gordon MD      July 23, 2021     Patient: Isaac Hancock   MR Number: [id number]   YOB: 1995   Date of Visit: 7/23/2021       Dear Dr. Cas Vergara: Thank you for referring Isaac Hancock to me for evaluation/treatment. Below are the relevant portions of my assessment and plan of care. If you have questions, please do not hesitate to call me. I look forward to following Karina Resendiz along with you.     Sincerely,    MD Almaz Canseco MD    CC providers:  Michael Jackson MD  [address] [place] [address]  [address] [facility]

## 2021-07-25 NOTE — PROGRESS NOTES
[facility]  [address] [place] [address].  [address] [facility] [place], [place]. [address]  Ph: [phone] Fax: [phone]  July 23, 2021    RE: Beth Camera   Dear Dr. Jose A Gonzalez       ? Thank you for sending  Ms. Naman Schaffer for Nonstress test  in our office on 7/ 23 /2021    ? [address] [place] [address] Street @ [address]   ? Augie Twins   ? She had a reactive, reassuring NST. ? Fetal Heart Rate Baseline 135 bpm (A); 140 (B)   ? Accelerations present , with no decelerations noted . ? Moderate variability  ? She noted fetal movement, no cramping or contractions . ? The patient is to continue to follow with you in your office for ongoing obstetric care. ? Followup visits as scheduled   ? Further evaluation and management will be dependent on her clinical presentation and the results of her testing. Once again, thank you for allowing us to participate in the care of this patient and if we can be of any further assistance to you, please do not hesitate to contact us.   Sincerely,      Lynder Soulier, MD  ?

## 2021-07-27 ENCOUNTER — ROUTINE PRENATAL (OUTPATIENT)
Dept: OBGYN CLINIC | Age: 26
End: 2021-07-27
Payer: COMMERCIAL

## 2021-07-27 ENCOUNTER — ANCILLARY PROCEDURE (OUTPATIENT)
Dept: OBGYN CLINIC | Age: 26
End: 2021-07-27
Payer: COMMERCIAL

## 2021-07-27 VITALS
DIASTOLIC BLOOD PRESSURE: 68 MMHG | HEIGHT: 64 IN | SYSTOLIC BLOOD PRESSURE: 105 MMHG | WEIGHT: 268.4 LBS | HEART RATE: 104 BPM | BODY MASS INDEX: 45.82 KG/M2

## 2021-07-27 DIAGNOSIS — D68.9 BLOOD COAGULATION DISORDER COMPLICATING PREGNANCY, THIRD TRIMESTER (HCC): ICD-10-CM

## 2021-07-27 DIAGNOSIS — E66.01 MATERNAL MORBID OBESITY IN THIRD TRIMESTER, ANTEPARTUM (HCC): ICD-10-CM

## 2021-07-27 DIAGNOSIS — O40.3XX2 POLYHYDRAMNIOS, THIRD TRIMESTER, FETUS 2: ICD-10-CM

## 2021-07-27 DIAGNOSIS — O99.213 MATERNAL MORBID OBESITY IN THIRD TRIMESTER, ANTEPARTUM (HCC): ICD-10-CM

## 2021-07-27 DIAGNOSIS — Z79.01 LONG TERM CURRENT USE OF ANTICOAGULANT THERAPY: ICD-10-CM

## 2021-07-27 DIAGNOSIS — O35.2XX0 HEREDITARY FAMILIAL DISEASE AFFECTING MANAGEMENT OF MOTHER AND POSSIBLY AFFECTING FETUS, ANTEPARTUM, SINGLE OR UNSPECIFIED FETUS: ICD-10-CM

## 2021-07-27 DIAGNOSIS — O36.63X2: ICD-10-CM

## 2021-07-27 DIAGNOSIS — Z3A.34 34 WEEKS GESTATION OF PREGNANCY: ICD-10-CM

## 2021-07-27 DIAGNOSIS — O30.043 DICHORIONIC DIAMNIOTIC TWIN PREGNANCY IN THIRD TRIMESTER: Primary | ICD-10-CM

## 2021-07-27 DIAGNOSIS — O99.113 BLOOD COAGULATION DISORDER COMPLICATING PREGNANCY, THIRD TRIMESTER (HCC): ICD-10-CM

## 2021-07-27 LAB
GLUCOSE URINE, POC: NEGATIVE
PROTEIN UA: NEGATIVE

## 2021-07-27 PROCEDURE — 76815 OB US LIMITED FETUS(S): CPT | Performed by: OBSTETRICS & GYNECOLOGY

## 2021-07-27 PROCEDURE — 76818 FETAL BIOPHYS PROFILE W/NST: CPT | Performed by: OBSTETRICS & GYNECOLOGY

## 2021-07-27 PROCEDURE — 99213 OFFICE O/P EST LOW 20 MIN: CPT | Performed by: OBSTETRICS & GYNECOLOGY

## 2021-07-27 PROCEDURE — 99212 OFFICE O/P EST SF 10 MIN: CPT | Performed by: OBSTETRICS & GYNECOLOGY

## 2021-07-27 PROCEDURE — 81002 URINALYSIS NONAUTO W/O SCOPE: CPT | Performed by: OBSTETRICS & GYNECOLOGY

## 2021-07-27 PROCEDURE — G8419 CALC BMI OUT NRM PARAM NOF/U: HCPCS | Performed by: OBSTETRICS & GYNECOLOGY

## 2021-07-27 PROCEDURE — G8427 DOCREV CUR MEDS BY ELIG CLIN: HCPCS | Performed by: OBSTETRICS & GYNECOLOGY

## 2021-07-27 PROCEDURE — 76820 UMBILICAL ARTERY ECHO: CPT | Performed by: OBSTETRICS & GYNECOLOGY

## 2021-07-27 PROCEDURE — 1036F TOBACCO NON-USER: CPT | Performed by: OBSTETRICS & GYNECOLOGY

## 2021-07-27 NOTE — PATIENT INSTRUCTIONS
is different, there's no way to know exactly what childbirth will be like for you. Talk to your doctor or midwife about any concerns you have. If you haven't already had the Tdap shot during this pregnancy, talk to your doctor about getting it. It will help protect your  against pertussis infection. In the 36th week, most women have a test for group B streptococcus (GBS). GBS is a common bacteria that can live in the vagina and rectum. It can make your baby sick after birth. If you test positive, you will get antibiotics during labor. The medicine will help keep your baby from getting the bacteria. Follow-up care is a key part of your treatment and safety. Be sure to make and go to all appointments, and call your doctor if you are having problems. It's also a good idea to know your test results and keep a list of the medicines you take. How can you care for yourself at home? Learn about pain relief choices  · Pain is different for every woman. Talk with your doctor about your feelings about pain. · You can choose from several types of pain relief. These include medicine or breathing techniques, as well as comfort measures. You can use more than one option. · If you choose to have pain medicine during labor, talk to your doctor about your options. Some medicines lower anxiety and help with some of the pain. Others make your lower body numb so that you won't feel pain. · Be sure to tell your doctor about your pain medicine choice before you start labor or very early in your labor. You may be able to change your mind as labor progresses. · Rarely, a woman is put to sleep by medicine given through a mask or an IV. Labor and delivery  · The first stage of labor has three parts: early, active, and transition. ? Most women have early labor at home. You can stay busy or rest, eat light snacks, drink clear fluids, and start counting contractions. ?  When talking during a contraction gets hard, you may be moving to active labor. During active labor, you should head for the hospital if you are not there already. ? You are in active labor when contractions come every 3 to 4 minutes and last about 60 seconds. Your cervix is opening more rapidly. ? If your water breaks, contractions will come faster and stronger. ? During transition, your cervix is stretching, and contractions are coming more rapidly. ? You may want to push, but your cervix might not be ready. Your doctor will tell you when to push. · The second stage starts when your cervix is completely opened and you are ready to push. ? Contractions are very strong to push the baby down the birth canal.  ? You will feel the urge to push. You may feel like you need to have a bowel movement. ? You may be coached to push with contractions. These contractions will be very strong, but you will not have them as often. You can get a little rest between contractions. ? You may be emotional and irritable. You may not be aware of what is going on around you.  ? One last push, and your baby is born. · The third stage is when a few more contractions push out the placenta. This may take 30 minutes or less. · The fourth stage is the welcome recovery. You may feel overwhelmed with emotions and exhausted but alert. This is a good time to start breastfeeding. Where can you learn more? Go to https://InnogeneticspeLazarus Effect.[employer]. org and sign in to your Pantry account. Enter W302 in the [facility] box to learn more about \"Weeks 34 to 36 of Your Pregnancy: Care Instructions. \"     If you do not have an account, please click on the \"Sign Up Now\" link. Current as of: October 8, 2020               Content Version: 12.9  © 7155-8239 Healthwise, Incorporated. Care instructions adapted under license by [facility] [facility] ([facility]).  If you have questions about a medical condition or this instruction, always ask your healthcare professional. [address] any warranty or liability for your use of this information. Patient Education        Learning About When to Call Your Doctor During Pregnancy (After 20 Weeks)  Your Care Instructions  It's common to have concerns about what might be a problem during pregnancy. Although most pregnant women don't have any serious problems, it's important to know when to call your doctor if you have certain symptoms or signs of labor. These are general suggestions. Your doctor may give you some more information about when to call. When to call your doctor (after 20 weeks)  Call 911  anytime you think you may need emergency care. For example, call if:  · You have severe vaginal bleeding. · You have sudden, severe pain in your belly. · You passed out (lost consciousness). · You have a seizure. · You see or feel the umbilical cord. · You think you are about to deliver your baby and can't make it safely to the hospital.  Call your doctor now or seek immediate medical care if:  · You have vaginal bleeding. · You have belly pain. · You have a fever. · You have symptoms of preeclampsia, such as:  ? Sudden swelling of your face, hands, or feet. ? New vision problems (such as dimness, blurring, or seeing spots). ? A severe headache. · You have a sudden release of fluid from your vagina. (You think your water broke.)  · You think that you may be in labor. This means that you've had at least 6 contractions in an hour. · You notice that your baby has stopped moving or is moving much less than normal.  · You have symptoms of a urinary tract infection. These may include:  ? Pain or burning when you urinate. ? A frequent need to urinate without being able to pass much urine. ? Pain in the flank, which is just below the rib cage and above the waist on either side of the back. ? Blood in your urine.   Watch closely for changes in your health, and be sure to contact your doctor if:  · You have vaginal discharge that smells bad.  · You have skin changes, such as:  ? A rash. ? Itching. ? Yellow color to your skin. · You have other concerns about your pregnancy. If you have labor signs at 37 weeks or more  If you have signs of labor at 37 weeks or more, your doctor may tell you to call when your labor becomes more active. Symptoms of active labor include:  · Contractions that are regular. · Contractions that are less than 5 minutes apart. · Contractions that are hard to talk through. Follow-up care is a key part of your treatment and safety. Be sure to make and go to all appointments, and call your doctor if you are having problems. It's also a good idea to know your test results and keep a list of the medicines you take. Where can you learn more? Go to https://SureVisitpeBuyHappy.[employer]. org and sign in to your Qubell account. Enter  in the [facility] box to learn more about \"Learning About When to Call Your Doctor During Pregnancy (After 20 Weeks). \"     If you do not have an account, please click on the \"Sign Up Now\" link. Current as of: October 8, 2020               Content Version: 12.9  © 6565-8261 [employer]ik. Care instructions adapted under license by [facility] ([facility]). If you have questions about a medical condition or this instruction, always ask your healthcare professional. [address] any warranty or liability for your use of this information. Patient Education        Counting Your Baby's Kicks: Care Instructions  Your Care Instructions     Counting your baby's kicks is one way your doctor can tell that your baby is healthy. Most women--especially in a first pregnancy--feel their baby move for the first time between 16 and 22 weeks. The movement may feel like flutters rather than kicks. Your baby may move more at certain times of the day. When you are active, you may notice less kicking than when you are resting.  At your prenatal visits, your doctor will About Twin Pregnancy  What is different about a twin pregnancy? In many ways, a twin pregnancy is like a single-baby pregnancy. Healthy twins develop like a single baby does until the last trimester, when their growth slows down. Twins are usually born before the usual 40-week due date. For the mother, carrying twins can be more difficult than carrying a single baby. And her risks are higher for pregnancy problems. That's why keeping up with prenatal checks and tests is especially important. How do twins grow? Twins develop from embryos to babies like a single baby does. · By weeks 10 to 14 of your pregnancy, one or two placentas have formed inside your uterus. It is possible to hear your babies' heartbeats with a special ultrasound device. Your babies' eyes can move. Their arms and legs can bend. · Around weeks 14 to 18, hair is beginning to grow on your babies' heads. · By 18 to 22 weeks, your babies can now suck their thumbs. Around this time, you may start to feel your babies move. At first, this might feel like fluttering or \"butterflies. \"  · Around week 26, your babies can open and close their eyelids. You may notice that they respond to the sound of your or your partner's voice. You may also notice that they do less turning and twisting and more squirming. · Up to 32 weeks, twins grow rapidly. By this point, they really start to look like babies, with hair and plump skin. · Around 32 to 34 weeks, twins' pace of growth slows down. Their lungs become more ready for breathing. This marks a stage when babies born early are less likely to have breathing problems after birth. What can you expect during a twin pregnancy? With a twin pregnancy, your body makes high levels of pregnancy hormones. So morning sickness may come on earlier and stronger than if you were carrying a single baby.  You may also have earlier and more intense symptoms from pregnancy, like swelling, heartburn, leg cramps, bladder discomfort, and sleep problems. Your belly may grow bigger, and you may gain more weight, sooner. Talk to your doctor about how much you might expect to gain. When you're carrying twins, you and your babies may be tested and checked more than you would for a single-baby pregnancy. · At about 10 weeks, an ultrasound can show if the babies are growing in the same amniotic sac. If they each have their own sac and their own placenta, twins have the best chances of both growing well. · Between weeks 18 and 20, an ultrasound may be able to show the sex of your babies. · Later in your pregnancy, you will start to have checkups more often. This will be sooner than for a single-baby pregnancy. Twins tend to be born early, but 37 weeks is a goal when mother and babies are doing well. As you get closer to delivery time, your medical team will help you know what to expect and what to do. As questions come to mind, keep a list so you can remember to ask your doctor. Follow-up care is a key part of your treatment and safety. Be sure to make and go to all appointments, and call your doctor if you are having problems. It's also a good idea to know your test results and keep a list of the medicines you take. Where can you learn more? Go to https://Run[employer] Games.[employer]. org and sign in to your Colorescience account. Enter J733 in the [facility] box to learn more about \"Learning About Twin Pregnancy. \"     If you do not have an account, please click on the \"Sign Up Now\" link. Current as of: October 8, 2020               Content Version: 12.9  © 7405-0548 Healthwise, Incorporated. Care instructions adapted under license by [facility] ([facility]). If you have questions about a medical condition or this instruction, always ask your healthcare professional. [place] [address] any warranty or liability for your use of this information.

## 2021-07-27 NOTE — LETTER
21     RE:  Rosalina Jaimes   : 1995   AGE: 31 y.o. REFERRING PHYSICIAN:                  Janeen Mcmahon MD    Dear   Mrs. Rosalina Jaimes a 31 y.o.  Hodan Toscano  is seen today on follow up in our office. REASON FOR APPOINTMENT:  · Follow-up on a pregnant patient with dichorionic diamniotic twin gestation and thrombophilia. MEDICATIONS:    · Prenatal Vitamins once per day   · Lovenox 40 mg subcutaneous once per day. · Vitamin D and calcium supplement. · Baby aspirin 81 mg once per day. · Iron supplement. INTERVAL HISTORY:  Mrs Rosalina Jaimes had an uneventful course of pregnancy since her last visit to our office. When seen today in our office she had no complaints. PHYSICAL EXAMINATION:  General Appearance:  Healthy looking, alert, no acute distress. Eyes:     No pallor, no icterus, no photophobia. Ears:     No ear drainage. Nose:     No nasal drainage, no paranasal sinus tenderness. Throat:   Mucosa moist, no oral thrush, no exudate. Neck:     No nuchal rigidity. Back:     No CVA tenderness. Abdomen:    Soft nontender. Extremities:    No pretibial pitting edema, no calf muscle tenderness. Skin:     No rashes, no lesions. BP: 105/68 Weight: 268 lb 6.4 oz (121.7 kg) Height: 5' 4\" (162.6 cm) Pulse: 104     Body mass index is 46.07 kg/m². Urine dipstick:  Glucose : Negative   Albumin:  Negative       An ultrasound evaluation was done in our office today. Please refer to the enclosed copy of the ultrasound report for further information. IMPRESSION:  1. A  34w2d Dichorionic diamniotic intrauterine twin gestation. 2. Maternal obesity. 3. Polyhydramnios noted around baby B.  4. Excessive fetal growth (EFW: twin A at the 89th centile, twin B at [address])  5. Family history of skeletal dysplasia (2 siblings delivered at term,  neonatally). 6. Negative Personal History of DVT. 7. Tested because of family history of DVT and pulmonary embolism (her mother)  6.  Received Celestone to accelerate fetal lung maturity (2021). 9. Received magnesium sulfate for fetal neural protection (2021). 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 The size of each baby is appropriate for gestational age, adequate concordant growth is noted. No anomalies are noted. 2. Only genetic amniocentesis can rule out fetal chromosomal anomalies, normal ultrasound does not. 3. She is carrying dichorionic diamniotic twin gestation. There is no risk of fetal fetal transfusion. Twin gestation is associated with an increased risk of:  · Premature delivery. (Cervical length today was reassuring against risk of  delivery.)  · Disturbance in the growth of her babies (Excessive fetal growth is noted with EFW: twin A at the 93rd centile, twin B at 96th). Growth is concordant. 4. Obesity is assosiated with an increased risk of developing gestational diabetes, and disturbance in the growth of her baby, such as Large for dates and small for Dates. Gestational diabetes was ruled out with negative glucose tolerance test that was done in your office 2021.   5. She is to continue the thromboprophylactic treatment and continue the treatment with calcium and vitamin D supplementation. 6. She should continue the treatment with baby aspirin 81 mg once per day to delay onset and decrease likelihood of developing preeclampsia. 7. She gives history of having two siblings with skeletal dysplasia. The condition in this situation is genetically inherited (not a de Juanpablo mutation). I discussed with the patient the fact that the condition might be detected antenatally on genetic amniocentesis or on ultrasound done in the second trimester. I asked the patient if she is interested in prenatal genetic testing for the skelettal dysplasia. She declined it. She said that she wouldn't consider having termination of pregnancy, if 1 or both of her babies have skeletal dysplasia, chromosomal abnormalities birth defects mental or motor retardation. 8. Polyhydramnios is noted on twin B with a deep pocket measuring 12.2 cm. Normal fluid noted around baby A.  9. Fetal well-being was confirmed today. The Biophysical profile score of 10/10 on each baby  is reassuring, and the umbilical artery Doppler studies are normal.  10. She should monitor fetal well-being at home by counting movements after dinner. If she perceives any decrease in movements,  she should call your office immediately. She is also to call, if she develops any headaches, blurred vision, abdominal pain, bleeding, or spotting, which are signs of preeclampsia. 11. She is to continue to follow with you in your office for ongoing obstetric care, and should be followed up in your office with nonstress test every Friday for remainder of pregnancy. 12. She should continue to be followed up in our [facility] office with biophysical profile umbilical artery Doppler once a week every Tuesday for remainder of pregnancy. Thank you again, doctor, for allowing us to be of service to your patient. If I can be of further assistance, please do not hesitate to call. Sincerely,        Cathy Fu M.D., [address] [facility]    Time spent on the encounter was over 15 minutes including counseling  coordination of care and direct face-to-face contact with the patient. Current encounter billing:  DE OFFICE OUTPATIENT VISIT 10-19 MINUTES [[id number]]  US OB 1 or More Fetus Limited [11940 Custom]  Biophysical profile [OBO12 Custom] x2  US Doppler Fetal Umbilical Artery [[id number] Custom]  x2    **This report has been created using voice recognition software.  It may contain minor errors     which are inherent in voice recognition technology**

## 2021-07-27 NOTE — PROGRESS NOTES
Good fetal movement  Good kick counts  Swelling in legs,feet, ankles and under abdomin  Pain in groin area

## 2021-07-27 NOTE — PROGRESS NOTES
Placed on moniter for NST of both babies @ 6078. Off moniter @ [id number]. Nst completed on both babies . Baseline baby A 120 with moderate variabilty+ accelelrations. Baseline baby B 130 with moderate variability + accelerations.  Both reactive per dr Irvin Sutton

## 2021-07-27 NOTE — LETTER
NON STRESS TEST INTERPRETATION    21    RE:  Priscilla Fleming   : 1995   AGE: 31 y.o. GESTATIONAL AGE:  34w2d      DIAGNOSIS:      Dichorionic diamniotic intrauterine twin gestation. Maternal obesity. Complex thrombophilia. INDICATION:        Twin gestation Dichorionic diamniotic.     TIME ON:  9:27 AM    TIME OFF:  9:48 AM      RESULT:   Twin A :    REACTIVE       Twin B :    REACTIVE       FHR Baseline Rate:   Twin A :     120 bpm       Twin B:     130 bpm    PERIODIC CHANGES:      Twin A :     Accelerations present, variability moderate, no decelerations noted  Twin B :          Accelerations present, variability moderate, no decelerations noted    COMMENTS:      She is to continue having NST's every 3-4 days, and BPP with umbilical artery doppler studies once per week        Rica Mae MD

## 2021-07-30 ENCOUNTER — ROUTINE PRENATAL (OUTPATIENT)
Dept: OBGYN CLINIC | Age: 26
End: 2021-07-30
Payer: COMMERCIAL

## 2021-07-30 VITALS
WEIGHT: 266.6 LBS | HEART RATE: 110 BPM | DIASTOLIC BLOOD PRESSURE: 68 MMHG | SYSTOLIC BLOOD PRESSURE: 101 MMHG | BODY MASS INDEX: 45.52 KG/M2 | HEIGHT: 64 IN

## 2021-07-30 DIAGNOSIS — O30.043 DICHORIONIC DIAMNIOTIC TWIN PREGNANCY IN THIRD TRIMESTER: Primary | ICD-10-CM

## 2021-07-30 DIAGNOSIS — Z3A.34 34 WEEKS GESTATION OF PREGNANCY: ICD-10-CM

## 2021-07-30 LAB
GLUCOSE URINE, POC: NEGATIVE
PROTEIN UA: NEGATIVE

## 2021-07-30 PROCEDURE — 99213 OFFICE O/P EST LOW 20 MIN: CPT

## 2021-07-30 PROCEDURE — 59025 FETAL NON-STRESS TEST: CPT | Performed by: OBSTETRICS & GYNECOLOGY

## 2021-07-30 PROCEDURE — 81002 URINALYSIS NONAUTO W/O SCOPE: CPT | Performed by: OBSTETRICS & GYNECOLOGY

## 2021-07-30 NOTE — PROGRESS NOTES
Placed on moniter for NST of both babies @ 46 off moniter @ 26 Nst completed on both babies  Baseline baby a 150 with moderate variabilty+ accelelrations.  Baseline baby B 145 with moderate variability+ acce;lerations both  reactive per dr Maurice Gonzalez

## 2021-07-30 NOTE — PATIENT INSTRUCTIONS
Patient Education        Weeks 34 to 39 of Your Pregnancy: Care Instructions  Overview     By now, your baby and your belly have grown quite large. It's almost time to give birth! Your baby's lungs are almost ready to breathe air. The skull bones are firm enough to protect your baby's head, but soft enough to move down through the birth canal.  You may be feeling excited and happy at times--but also anxious or scared. You might wonder how you'll know if you're in labor or what to expect during labor. Try to be open and flexible in your expectations of the birth. Because each birth is different, there's no way to know exactly what childbirth will be like for you. Talk to your doctor or midwife about any concerns you have. If you haven't already had the Tdap shot during this pregnancy, talk to your doctor about getting it. It will help protect your  against pertussis infection. In the 36th week, most women have a test for group B streptococcus (GBS). GBS is a common bacteria that can live in the vagina and rectum. It can make your baby sick after birth. If you test positive, you will get antibiotics during labor. The medicine will help keep your baby from getting the bacteria. Follow-up care is a key part of your treatment and safety. Be sure to make and go to all appointments, and call your doctor if you are having problems. It's also a good idea to know your test results and keep a list of the medicines you take. How can you care for yourself at home? Learn about pain relief choices  · Pain is different for every woman. Talk with your doctor about your feelings about pain. · You can choose from several types of pain relief. These include medicine or breathing techniques, as well as comfort measures. You can use more than one option. · If you choose to have pain medicine during labor, talk to your doctor about your options. Some medicines lower anxiety and help with some of the pain.  Others make your lower body numb so that you won't feel pain. · Be sure to tell your doctor about your pain medicine choice before you start labor or very early in your labor. You may be able to change your mind as labor progresses. · Rarely, a woman is put to sleep by medicine given through a mask or an IV. Labor and delivery  · The first stage of labor has three parts: early, active, and transition. ? Most women have early labor at home. You can stay busy or rest, eat light snacks, drink clear fluids, and start counting contractions. ? When talking during a contraction gets hard, you may be moving to active labor. During active labor, you should head for the hospital if you are not there already. ? You are in active labor when contractions come every 3 to 4 minutes and last about 60 seconds. Your cervix is opening more rapidly. ? If your water breaks, contractions will come faster and stronger. ? During transition, your cervix is stretching, and contractions are coming more rapidly. ? You may want to push, but your cervix might not be ready. Your doctor will tell you when to push. · The second stage starts when your cervix is completely opened and you are ready to push. ? Contractions are very strong to push the baby down the birth canal.  ? You will feel the urge to push. You may feel like you need to have a bowel movement. ? You may be coached to push with contractions. These contractions will be very strong, but you will not have them as often. You can get a little rest between contractions. ? You may be emotional and irritable. You may not be aware of what is going on around you.  ? One last push, and your baby is born. · The third stage is when a few more contractions push out the placenta. This may take 30 minutes or less. · The fourth stage is the welcome recovery. You may feel overwhelmed with emotions and exhausted but alert. This is a good time to start breastfeeding. Where can you learn more?   Go to https://chpepiceweb.[email or web address] org and sign in to your [facility] account. Enter F785 in the [employer] box to learn more about \"Weeks 34 to 36 of Your Pregnancy: Care Instructions. \"     If you do not have an account, please click on the \"Sign Up Now\" link. Current as of: October 8, 2020               Content Version: 12.9  © 2006-2021 [employer]. Care instructions adapted under license by [email or web address] ([facility]). If you have questions about a medical condition or this instruction, always ask your healthcare professional. [place] [address] any warranty or liability for your use of this information. Patient Education        Learning About When to Call Your Doctor During Pregnancy (After 20 Weeks)  Your Care Instructions  It's common to have concerns about what might be a problem during pregnancy. Although most pregnant women don't have any serious problems, it's important to know when to call your doctor if you have certain symptoms or signs of labor. These are general suggestions. Your doctor may give you some more information about when to call. When to call your doctor (after 20 weeks)  Call 911  anytime you think you may need emergency care. For example, call if:  · You have severe vaginal bleeding. · You have sudden, severe pain in your belly. · You passed out (lost consciousness). · You have a seizure. · You see or feel the umbilical cord. · You think you are about to deliver your baby and can't make it safely to the hospital.  Call your doctor now or seek immediate medical care if:  · You have vaginal bleeding. · You have belly pain. · You have a fever. · You have symptoms of preeclampsia, such as:  ? Sudden swelling of your face, hands, or feet. ? New vision problems (such as dimness, blurring, or seeing spots). ? A severe headache. · You have a sudden release of fluid from your vagina.  (You think your water broke.)  · You think that you may be in labor. This means that you've had at least 6 contractions in an hour. · You notice that your baby has stopped moving or is moving much less than normal.  · You have symptoms of a urinary tract infection. These may include:  ? Pain or burning when you urinate. ? A frequent need to urinate without being able to pass much urine. ? Pain in the flank, which is just below the rib cage and above the waist on either side of the back. ? Blood in your urine. Watch closely for changes in your health, and be sure to contact your doctor if:  · You have vaginal discharge that smells bad. · You have skin changes, such as:  ? A rash. ? Itching. ? Yellow color to your skin. · You have other concerns about your pregnancy. If you have labor signs at 37 weeks or more  If you have signs of labor at 37 weeks or more, your doctor may tell you to call when your labor becomes more active. Symptoms of active labor include:  · Contractions that are regular. · Contractions that are less than 5 minutes apart. · Contractions that are hard to talk through. Follow-up care is a key part of your treatment and safety. Be sure to make and go to all appointments, and call your doctor if you are having problems. It's also a good idea to know your test results and keep a list of the medicines you take. Where can you learn more? Go to https://chellie.[email or web address] org and sign in to your [employer] account. Enter  in the [facility] box to learn more about \"Learning About When to Call Your Doctor During Pregnancy (After 20 Weeks). \"     If you do not have an account, please click on the \"Sign Up Now\" link. Current as of: October 8, 2020               Content Version: 12.9  © 8351-6216 Healthwise, [employer]. Care instructions adapted under license by [facility] [place] ([facility]).  If you have questions about a medical condition or this instruction, always ask your healthcare professional. [address] any warranty or liability for your use of this information. Patient Education        Counting Your Baby's Kicks: Care Instructions  Your Care Instructions     Counting your baby's kicks is one way your doctor can tell that your baby is healthy. Most women--especially in a first pregnancy--feel their baby move for the first time between 16 and 22 weeks. The movement may feel like flutters rather than kicks. Your baby may move more at certain times of the day. When you are active, you may notice less kicking than when you are resting. At your prenatal visits, your doctor will ask whether the baby is active. In your last trimester, your doctor may ask you to count the number of times you feel your baby move. Follow-up care is a key part of your treatment and safety. Be sure to make and go to all appointments, and call your doctor if you are having problems. It's also a good idea to know your test results and keep a list of the medicines you take. How do you count fetal kicks? · A common method of checking your baby's movement is to count the number of kicks or moves you feel in 1 hour. Ten movements (such as kicks, flutters, or rolls) in 1 hour are normal. Some doctors suggest that you count in the morning until you get to 10 movements. Then you can quit for that day and start again the next day. · Pick your baby's most active time of day to count. This may be any time from morning to evening. · If you do not feel 10 movements in an hour, your baby may be sleeping. Wait for the next hour and count again. When should you call for help? Call your doctor now or seek immediate medical care if:    · You noticed that your baby has stopped moving or is moving much less than normal.   Watch closely for changes in your health, and be sure to contact your doctor if you have any problems. Where can you learn more? Go to https://ángela.[employer]. org and sign in to your [employer] account.  Enter C037 in the Search Health Information box to learn more about \"Counting Your Baby's Kicks: Care Instructions. \"     If you do not have an account, please click on the \"Sign Up Now\" link. Current as of: October 8, 2020               Content Version: 12.9  © 6044-3631 Healthwise, [employer]. Care instructions adapted under license by [facility] ([facility]). If you have questions about a medical condition or this instruction, always ask your healthcare professional. [address] any warranty or liability for your use of this information.

## 2021-08-01 NOTE — PROGRESS NOTES
[address] [address] [address] [facility] [place], [place]. [address]  Ph: [phone] Fax: [phone]  2021     RE: Elisa Firero   Dear Dr. Mel Handy       · Thank you for sending  Ms. Kevin Trujillo for Nonstress test  in our office on     · 25yo Wf  @ 34w5d   · Augie Twins   · She had a reactive, reassuring NST. ? Fetal Heart Rate Baseline 150bpm (A); 140 (B)   ? Accelerations present , with no decelerations noted . ? Moderate variability  · She noted fetal movement, no cramping or contractions . · The patient is to continue to follow with you in your office for ongoing obstetric care. · Followup visits as scheduled   · Further evaluation and management will be dependent on her clinical presentation and the results of her testing. Once again, thank you for allowing us to participate in the care of this patient and if we can be of any further assistance to you, please do not hesitate to contact us.   Sincerely,        Kandis Colvin MD

## 2021-08-03 ENCOUNTER — ANCILLARY PROCEDURE (OUTPATIENT)
Dept: OBGYN CLINIC | Age: 26
End: 2021-08-03
Payer: COMMERCIAL

## 2021-08-03 ENCOUNTER — ROUTINE PRENATAL (OUTPATIENT)
Dept: OBGYN CLINIC | Age: 26
End: 2021-08-03
Payer: COMMERCIAL

## 2021-08-03 VITALS
BODY MASS INDEX: 45.75 KG/M2 | DIASTOLIC BLOOD PRESSURE: 63 MMHG | WEIGHT: 268 LBS | HEIGHT: 64 IN | HEART RATE: 102 BPM | SYSTOLIC BLOOD PRESSURE: 98 MMHG

## 2021-08-03 DIAGNOSIS — O36.63X2: ICD-10-CM

## 2021-08-03 DIAGNOSIS — O99.213 MATERNAL MORBID OBESITY IN THIRD TRIMESTER, ANTEPARTUM (HCC): ICD-10-CM

## 2021-08-03 DIAGNOSIS — O40.3XX2 POLYHYDRAMNIOS, THIRD TRIMESTER, FETUS 2: ICD-10-CM

## 2021-08-03 DIAGNOSIS — O30.043 DICHORIONIC DIAMNIOTIC TWIN PREGNANCY IN THIRD TRIMESTER: Primary | ICD-10-CM

## 2021-08-03 DIAGNOSIS — E66.01 MATERNAL MORBID OBESITY IN THIRD TRIMESTER, ANTEPARTUM (HCC): ICD-10-CM

## 2021-08-03 DIAGNOSIS — O99.113 BLOOD COAGULATION DISORDER COMPLICATING PREGNANCY, THIRD TRIMESTER (HCC): ICD-10-CM

## 2021-08-03 DIAGNOSIS — O35.2XX0 HEREDITARY FAMILIAL DISEASE AFFECTING MANAGEMENT OF MOTHER AND POSSIBLY AFFECTING FETUS, ANTEPARTUM, SINGLE OR UNSPECIFIED FETUS: ICD-10-CM

## 2021-08-03 DIAGNOSIS — Z79.01 LONG TERM CURRENT USE OF ANTICOAGULANT THERAPY: ICD-10-CM

## 2021-08-03 DIAGNOSIS — D68.9 BLOOD COAGULATION DISORDER COMPLICATING PREGNANCY, THIRD TRIMESTER (HCC): ICD-10-CM

## 2021-08-03 DIAGNOSIS — Z3A.35 35 WEEKS GESTATION OF PREGNANCY: ICD-10-CM

## 2021-08-03 LAB
GLUCOSE URINE, POC: NEGATIVE
PROTEIN UA: POSITIVE

## 2021-08-03 PROCEDURE — 81002 URINALYSIS NONAUTO W/O SCOPE: CPT | Performed by: OBSTETRICS & GYNECOLOGY

## 2021-08-03 PROCEDURE — 76820 UMBILICAL ARTERY ECHO: CPT | Performed by: OBSTETRICS & GYNECOLOGY

## 2021-08-03 PROCEDURE — 99212 OFFICE O/P EST SF 10 MIN: CPT | Performed by: OBSTETRICS & GYNECOLOGY

## 2021-08-03 PROCEDURE — 76815 OB US LIMITED FETUS(S): CPT | Performed by: OBSTETRICS & GYNECOLOGY

## 2021-08-03 PROCEDURE — G8419 CALC BMI OUT NRM PARAM NOF/U: HCPCS | Performed by: OBSTETRICS & GYNECOLOGY

## 2021-08-03 PROCEDURE — 99213 OFFICE O/P EST LOW 20 MIN: CPT | Performed by: OBSTETRICS & GYNECOLOGY

## 2021-08-03 PROCEDURE — 1036F TOBACCO NON-USER: CPT | Performed by: OBSTETRICS & GYNECOLOGY

## 2021-08-03 PROCEDURE — 76818 FETAL BIOPHYS PROFILE W/NST: CPT | Performed by: OBSTETRICS & GYNECOLOGY

## 2021-08-03 PROCEDURE — G8427 DOCREV CUR MEDS BY ELIG CLIN: HCPCS | Performed by: OBSTETRICS & GYNECOLOGY

## 2021-08-03 NOTE — PROGRESS NOTES
Patient here for pregnancy ultrasound. Denies any bleeding, cramping or LOF  States did see some lights floating in left eye a few days ago Denies any headache.   Does complain of both legs and feet being swollen  +FM noted

## 2021-08-03 NOTE — PATIENT INSTRUCTIONS
if you are sick, not feeling well or have an infectious process going on please reschedule your appointment by calling [phone]. Also if any family members are not feeling well, please do not bring them to your appointment. We appreciate your cooperation. We are doing this in order to protect our pregnant mothers+ their babies. Call your primary obstetrician with bleeding, leaking of fluid, abdominal tenderness, headache, blurry vision, epigastric pain and increased urinary frequency. Any questions contact Julia at [phone]. If you are experiencing an emergency and need immediate help, call 911 or go to go emergency room or labor and delivery. Please arrive for your scheduled appointment at least 15 minutes early with your actual insurance card+ a photo ID. Also if you need any refills ordered or have questions, it may take up 48 hours to reply. Please allow ample time for your refills. Call me when you use last refill. Thank you for your cooperation. You might be having an NST at your next appt. Please eat a large snack or breakfast before coming to office. Thank youDo kick counts after dinner. Call your primary obstetrician if less than 10 kicks in 2 hours after dinner. Call your primary obstetrician with bleeding, leaking of fluid, abdominal tenderness, headache, blurry vision, epigastric pain and increased urinary frequency. Patient Education        Weeks 34 to 39 of Your Pregnancy: Care Instructions  Overview     By now, your baby and your belly have grown quite large. It's almost time to give birth! Your baby's lungs are almost ready to breathe air. The skull bones are firm enough to protect your baby's head, but soft enough to move down through the birth canal.  You may be feeling excited and happy at times--but also anxious or scared. You might wonder how you'll know if you're in labor or what to expect during labor. Try to be open and flexible in your expectations of the birth.  Because each birth is different, there's no way to know exactly what childbirth will be like for you. Talk to your doctor or midwife about any concerns you have. If you haven't already had the Tdap shot during this pregnancy, talk to your doctor about getting it. It will help protect your  against pertussis infection. In the 36th week, most women have a test for group B streptococcus (GBS). GBS is a common bacteria that can live in the vagina and rectum. It can make your baby sick after birth. If you test positive, you will get antibiotics during labor. The medicine will help keep your baby from getting the bacteria. Follow-up care is a key part of your treatment and safety. Be sure to make and go to all appointments, and call your doctor if you are having problems. It's also a good idea to know your test results and keep a list of the medicines you take. How can you care for yourself at home? Learn about pain relief choices  · Pain is different for every woman. Talk with your doctor about your feelings about pain. · You can choose from several types of pain relief. These include medicine or breathing techniques, as well as comfort measures. You can use more than one option. · If you choose to have pain medicine during labor, talk to your doctor about your options. Some medicines lower anxiety and help with some of the pain. Others make your lower body numb so that you won't feel pain. · Be sure to tell your doctor about your pain medicine choice before you start labor or very early in your labor. You may be able to change your mind as labor progresses. · Rarely, a woman is put to sleep by medicine given through a mask or an IV. Labor and delivery  · The first stage of labor has three parts: early, active, and transition. ? Most women have early labor at home. You can stay busy or rest, eat light snacks, drink clear fluids, and start counting contractions. ?  When talking during a contraction gets hard, you may be moving to active labor. During active labor, you should head for the hospital if you are not there already. ? You are in active labor when contractions come every 3 to 4 minutes and last about 60 seconds. Your cervix is opening more rapidly. ? If your water breaks, contractions will come faster and stronger. ? During transition, your cervix is stretching, and contractions are coming more rapidly. ? You may want to push, but your cervix might not be ready. Your doctor will tell you when to push. · The second stage starts when your cervix is completely opened and you are ready to push. ? Contractions are very strong to push the baby down the birth canal.  ? You will feel the urge to push. You may feel like you need to have a bowel movement. ? You may be coached to push with contractions. These contractions will be very strong, but you will not have them as often. You can get a little rest between contractions. ? You may be emotional and irritable. You may not be aware of what is going on around you.  ? One last push, and your baby is born. · The third stage is when a few more contractions push out the placenta. This may take 30 minutes or less. · The fourth stage is the welcome recovery. You may feel overwhelmed with emotions and exhausted but alert. This is a good time to start breastfeeding. Where can you learn more? Go to https://O'ol BluepeMedPlexus.StepsAway. org and sign in to your Xdynia account. Enter X975 in the [facility] box to learn more about \"Weeks 34 to 36 of Your Pregnancy: Care Instructions. \"     If you do not have an account, please click on the \"Sign Up Now\" link. Current as of: October 8, 2020               Content Version: 12.9  © 5825-2529 Healthwise, Incorporated. Care instructions adapted under license by [facility] [employer] [facility] ([facility]).  If you have questions about a medical condition or this instruction, always ask your healthcare professional. [address] any warranty or liability for your use of this information. Patient Education        Learning About When to Call Your Doctor During Pregnancy (After 20 Weeks)  Your Care Instructions  It's common to have concerns about what might be a problem during pregnancy. Although most pregnant women don't have any serious problems, it's important to know when to call your doctor if you have certain symptoms or signs of labor. These are general suggestions. Your doctor may give you some more information about when to call. When to call your doctor (after 20 weeks)  Call 911  anytime you think you may need emergency care. For example, call if:  · You have severe vaginal bleeding. · You have sudden, severe pain in your belly. · You passed out (lost consciousness). · You have a seizure. · You see or feel the umbilical cord. · You think you are about to deliver your baby and can't make it safely to the hospital.  Call your doctor now or seek immediate medical care if:  · You have vaginal bleeding. · You have belly pain. · You have a fever. · You have symptoms of preeclampsia, such as:  ? Sudden swelling of your face, hands, or feet. ? New vision problems (such as dimness, blurring, or seeing spots). ? A severe headache. · You have a sudden release of fluid from your vagina. (You think your water broke.)  · You think that you may be in labor. This means that you've had at least 6 contractions in an hour. · You notice that your baby has stopped moving or is moving much less than normal.  · You have symptoms of a urinary tract infection. These may include:  ? Pain or burning when you urinate. ? A frequent need to urinate without being able to pass much urine. ? Pain in the flank, which is just below the rib cage and above the waist on either side of the back. ? Blood in your urine.   Watch closely for changes in your health, and be sure to contact your doctor if:  · You have vaginal discharge that smells bad.  · You have skin changes, such as:  ? A rash. ? Itching. ? Yellow color to your skin. · You have other concerns about your pregnancy. If you have labor signs at 37 weeks or more  If you have signs of labor at 37 weeks or more, your doctor may tell you to call when your labor becomes more active. Symptoms of active labor include:  · Contractions that are regular. · Contractions that are less than 5 minutes apart. · Contractions that are hard to talk through. Follow-up care is a key part of your treatment and safety. Be sure to make and go to all appointments, and call your doctor if you are having problems. It's also a good idea to know your test results and keep a list of the medicines you take. Where can you learn more? Go to https://VideoflowpePlinga.Here@ Networks. org and sign in to your Wealth Access account. Enter  in the [facility] box to learn more about \"Learning About When to Call Your Doctor During Pregnancy (After 20 Weeks). \"     If you do not have an account, please click on the \"Sign Up Now\" link. Current as of: October 8, 2020               Content Version: 12.9  © 9166-9301 [employer]is[employer]. Care instructions adapted under license by [facility] ([facility]). If you have questions about a medical condition or this instruction, always ask your healthcare professional. [address] any warranty or liability for your use of this information. Patient Education        Counting Your Baby's Kicks: Care Instructions  Your Care Instructions     Counting your baby's kicks is one way your doctor can tell that your baby is healthy. Most women--especially in a first pregnancy--feel their baby move for the first time between 16 and 22 weeks. The movement may feel like flutters rather than kicks. Your baby may move more at certain times of the day. When you are active, you may notice less kicking than when you are resting.  At your prenatal visits, your doctor will ask whether the baby is active. In your last trimester, your doctor may ask you to count the number of times you feel your baby move. Follow-up care is a key part of your treatment and safety. Be sure to make and go to all appointments, and call your doctor if you are having problems. It's also a good idea to know your test results and keep a list of the medicines you take. How do you count fetal kicks? · A common method of checking your baby's movement is to count the number of kicks or moves you feel in 1 hour. Ten movements (such as kicks, flutters, or rolls) in 1 hour are normal. Some doctors suggest that you count in the morning until you get to 10 movements. Then you can quit for that day and start again the next day. · Pick your baby's most active time of day to count. This may be any time from morning to evening. · If you do not feel 10 movements in an hour, your baby may be sleeping. Wait for the next hour and count again. When should you call for help? Call your doctor now or seek immediate medical care if:    · You noticed that your baby has stopped moving or is moving much less than normal.   Watch closely for changes in your health, and be sure to contact your doctor if you have any problems. Where can you learn more? Go to https://Piggybackr.Kash. org and sign in to your I Like My Waitress account. Enter N726 in the [facility] box to learn more about \"Counting Your Baby's Kicks: Care Instructions. \"     If you do not have an account, please click on the \"Sign Up Now\" link. Current as of: October 8, 2020               Content Version: 12.9  © 2006-2021 Healthwise, [employer]. Care instructions adapted under license by [facility] ([facility]). If you have questions about a medical condition or this instruction, always ask your healthcare professional. [place] [address] any warranty or liability for your use of this information.          Patient Education        Learning About Twin Pregnancy  What is different about a twin pregnancy? In many ways, a twin pregnancy is like a single-baby pregnancy. Healthy twins develop like a single baby does until the last trimester, when their growth slows down. Twins are usually born before the usual 40-week due date. For the mother, carrying twins can be more difficult than carrying a single baby. And her risks are higher for pregnancy problems. That's why keeping up with prenatal checks and tests is especially important. How do twins grow? Twins develop from embryos to babies like a single baby does. · By weeks 10 to 14 of your pregnancy, one or two placentas have formed inside your uterus. It is possible to hear your babies' heartbeats with a special ultrasound device. Your babies' eyes can move. Their arms and legs can bend. · Around weeks 14 to 18, hair is beginning to grow on your babies' heads. · By 18 to 22 weeks, your babies can now suck their thumbs. Around this time, you may start to feel your babies move. At first, this might feel like fluttering or \"butterflies. \"  · Around week 26, your babies can open and close their eyelids. You may notice that they respond to the sound of your or your partner's voice. You may also notice that they do less turning and twisting and more squirming. · Up to 32 weeks, twins grow rapidly. By this point, they really start to look like babies, with hair and plump skin. · Around 32 to 34 weeks, twins' pace of growth slows down. Their lungs become more ready for breathing. This marks a stage when babies born early are less likely to have breathing problems after birth. What can you expect during a twin pregnancy? With a twin pregnancy, your body makes high levels of pregnancy hormones. So morning sickness may come on earlier and stronger than if you were carrying a single baby.  You may also have earlier and more intense symptoms from pregnancy, like swelling, heartburn, leg cramps, bladder discomfort, and sleep problems. Your belly may grow bigger, and you may gain more weight, sooner. Talk to your doctor about how much you might expect to gain. When you're carrying twins, you and your babies may be tested and checked more than you would for a single-baby pregnancy. · At about 10 weeks, an ultrasound can show if the babies are growing in the same amniotic sac. If they each have their own sac and their own placenta, twins have the best chances of both growing well. · Between weeks 18 and 20, an ultrasound may be able to show the sex of your babies. · Later in your pregnancy, you will start to have checkups more often. This will be sooner than for a single-baby pregnancy. Twins tend to be born early, but 37 weeks is a goal when mother and babies are doing well. As you get closer to delivery time, your medical team will help you know what to expect and what to do. As questions come to mind, keep a list so you can remember to ask your doctor. Follow-up care is a key part of your treatment and safety. Be sure to make and go to all appointments, and call your doctor if you are having problems. It's also a good idea to know your test results and keep a list of the medicines you take. Where can you learn more? Go to https://Versa.Lab7 Systems. org and sign in to your HundredApples account. Enter U409 in the [employer] box to learn more about \"Learning About Twin Pregnancy. \"     If you do not have an account, please click on the \"Sign Up Now\" link. Current as of: October 8, 2020               Content Version: 12.9  © 3416-6152 Healthwise, Incorporated. Care instructions adapted under license by [address] If you have questions about a medical condition or this instruction, always ask your healthcare professional. [place] [address] any warranty or liability for your use of this information.

## 2021-08-03 NOTE — PROGRESS NOTES
Placed on moniter for NST of both babies. Nst completed on both babies . Baseline baby A 130 with moderate variabilty+ accelelrations. Baseline baby B 140 with moderate variability+ accelelrations.  both Reactive per dr Ja Go

## 2021-08-03 NOTE — PROGRESS NOTES
8/3/21     RE:  Jose Pepe   : 1995   AGE: 31 y.o. REFERRING PHYSICIAN:                  Sanjiv Celestin MD    Dear   Mrs. Jose Pepe a 31 y.o.  Torres Castro  is seen today on follow up in our office. REASON FOR APPOINTMENT:  · Follow-up on a pregnant patient with dichorionic diamniotic twin gestation and thrombophilia. MEDICATIONS:    · Prenatal Vitamins once per day   · Lovenox 40 mg subcutaneous once per day. · Vitamin D and calcium supplement. · Baby aspirin 81 mg once per day. · Iron supplement. INTERVAL HISTORY:  Mrs Jose Pepe had an uneventful course of pregnancy since her last visit to our office. When seen today in our office she had no complaints. PHYSICAL EXAMINATION:  General Appearance:  Healthy looking, alert, no acute distress. Eyes:     No pallor, no icterus, no photophobia. Ears:     No ear drainage. Nose:     No nasal drainage, no paranasal sinus tenderness. Throat:   Mucosa moist, no oral thrush, no exudate. Neck:     No nuchal rigidity. Back:     No CVA tenderness. Abdomen:    Soft nontender. Extremities:    No pretibial pitting edema, no calf muscle tenderness. Skin:     No rashes, no lesions. BP: 98/63 Weight: 268 lb (121.6 kg) Height: 5' 4\" (162.6 cm) Pulse: 102     Body mass index is 46 kg/m². Urine dipstick:  Glucose : Negative   Albumin:  Trace       An ultrasound evaluation was done in our office today. Please refer to the enclosed copy of the ultrasound report for further information. IMPRESSION:  1. A  35w2d  intrauterine gestation. Dichorionic diamniotic intrauterine twin gestation. 2. Maternal obesity. 3. Polyhydramnios noted around baby B.  4. Excessive fetal growth (EFW: twin A at the 96th centile, twin B above 99th)  5. Family history of skeletal dysplasia (2 siblings delivered at term,  neonatally). 6. Negative Personal History of DVT.   7. Tested because of family history of DVT and pulmonary embolism (her mother)  6. Received Celestone to accelerate fetal lung maturity (2021). 9. Received magnesium sulfate for fetal neural protection (2021). 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 The size of each baby is appropriate for gestational age, adequate concordant growth is noted. No anomalies are noted. 2. Only genetic amniocentesis can rule out fetal chromosomal anomalies, normal ultrasound does not. 3. She is carrying dichorionic diamniotic twin gestation. There is no risk of fetal fetal transfusion. Twin gestation is associated with an increased risk of:  · Premature delivery. (Cervical length today was reassuring against risk of  delivery.)  · Disturbance in the growth of her babies (Excessive fetal growth is noted with EFW: twin A at the 96th centile, twin B above 99th). Growth is concordant. 4. Obesity is assosiated with an increased risk of developing gestational diabetes, and disturbance in the growth of her baby, such as Large for dates and small for Dates. Gestational diabetes was ruled out with negative glucose tolerance test that was done in your office 2021.   5. She is to continue the thromboprophylactic treatment and continue the treatment with calcium and vitamin D supplementation. 6. She should continue the treatment with baby aspirin 81 mg once per day to delay onset and decrease likelihood of developing preeclampsia. 7. She gives history of having two siblings with skeletal dysplasia. The condition in this situation is genetically inherited (not a de Juanpablo mutation). I discussed with the patient the fact that the condition might be detected antenatally on genetic amniocentesis or on ultrasound done in the second trimester.  I asked the patient if she is interested in prenatal genetic testing for the skelettal dysplasia. She declined it. She said that she wouldn't consider having termination of pregnancy, if 1 or both of her babies have skeletal dysplasia, chromosomal abnormalities birth defects mental or motor retardation. 8. Polyhydramnios is noted on twin B with a deep pocket measuring 10.8 cm. Normal fluid noted around baby A.  9. Fetal well-being was confirmed today. The Biophysical profile score of 10/10 on each baby  is reassuring, and the umbilical artery Doppler studies are normal.  10. She should monitor fetal well-being at home by counting movements after dinner. If she perceives any decrease in movements,  she should call your office immediately. She is also to call, if she develops any headaches, blurred vision, abdominal pain, bleeding, or spotting, which are signs of preeclampsia. 11. Due to the multiple comorbidities, I recommend delivery anytime between 37 and 38 weeks of gestation,( or earlier if there is evidence of fetal jeopardy) after receiving 2 doses of Celestone to accelerate fetal lung maturity. 12. She is to continue to follow with you in your office for ongoing obstetric care, and should be followed up in your office with nonstress test every Friday for remainder of pregnancy. 13. She should continue to be followed up in our [facility] office with biophysical profile umbilical artery Doppler once a week every Tuesday for remainder of pregnancy. Thank you again, doctor, for allowing us to be of service to your patient. If I can be of further assistance, please do not hesitate to call. Sincerely,        Vikas Dejesus M.D., [address] [facility]    Time spent on the encounter was over 15 minutes including counseling  coordination of care and direct face-to-face contact with the patient.     Current encounter billing:  [place] OFFICE OUTPATIENT VISIT 10-19 MINUTES [[id number]]  US OB 1 or More Fetus Limited [15764 Custom]  Biophysical profile [OBO12 Custom] x2  US Doppler Fetal Umbilical Artery [SKW4803 Custom]  x2    **This report has been created using voice recognition software. It may contain minor errors     which are inherent in voice recognition technology**                  NON STRESS TEST INTERPRETATION    8/3/21    RE:  Geraldine Hui   : 1995   AGE: 31 y.o. GESTATIONAL AGE:  35w2d    DIAGNOSIS:      Dichorionic diamniotic intrauterine twin gestation. Maternal obesity. Complex thrombophilia. INDICATION:        Twin gestation Dichorionic diamniotic.     TIME ON:  9:24 AM    TIME OFF:  9:45 AM      RESULT:   Twin A :    REACTIVE       Twin B :    REACTIVE       FHR Baseline Rate:   Twin A :    130 bpm       Twin B:     140 bpm    PERIODIC CHANGES:      Twin A :     Accelerations present, variability moderate, no decelerations noted  Twin B :          Accelerations present, variability moderate, no decelerations noted    COMMENTS:      She is to continue having NST's every 3-4 days, and BPP with umbilical artery doppler studies once per week        Catalina Lundborg, MD

## 2021-08-03 NOTE — LETTER
8/3/21     RE:  Tami uHghes   : 1995   AGE: 31 y.o. REFERRING PHYSICIAN:                  Hazel Levi MD    Dear   Mrs. Tami Hughes a 31 y.o.  Adama Rolon  is seen today on follow up in our office. REASON FOR APPOINTMENT:  · Follow-up on a pregnant patient with dichorionic diamniotic twin gestation and thrombophilia. MEDICATIONS:    · Prenatal Vitamins once per day   · Lovenox 40 mg subcutaneous once per day. · Vitamin D and calcium supplement. · Baby aspirin 81 mg once per day. · Iron supplement. INTERVAL HISTORY:  Mrs Tami Hughes had an uneventful course of pregnancy since her last visit to our office. When seen today in our office she had no complaints. PHYSICAL EXAMINATION:  General Appearance:  Healthy looking, alert, no acute distress. Eyes:     No pallor, no icterus, no photophobia. Ears:     No ear drainage. Nose:     No nasal drainage, no paranasal sinus tenderness. Throat:   Mucosa moist, no oral thrush, no exudate. Neck:     No nuchal rigidity. Back:     No CVA tenderness. Abdomen:    Soft nontender. Extremities:    No pretibial pitting edema, no calf muscle tenderness. Skin:     No rashes, no lesions. BP: 98/63 Weight: 268 lb (121.6 kg) Height: 5' 4\" (162.6 cm) Pulse: 102     Body mass index is 46 kg/m². Urine dipstick:  Glucose : Negative   Albumin:  Trace       An ultrasound evaluation was done in our office today. Please refer to the enclosed copy of the ultrasound report for further information. IMPRESSION:  1. A  35w2d  intrauterine gestation. Dichorionic diamniotic intrauterine twin gestation. 2. Maternal obesity. 3. Polyhydramnios noted around baby B.  4. Excessive fetal growth (EFW: twin A at the 96th centile, twin B above 99th)  5. Family history of skeletal dysplasia (2 siblings delivered at term,  neonatally). 6. Negative Personal History of DVT.   7. Tested because of family history of DVT and pulmonary embolism (her mother)  6. Received Celestone to accelerate fetal lung maturity (2021). 9. Received magnesium sulfate for fetal neural protection (2021). 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 The size of each baby is appropriate for gestational age, adequate concordant growth is noted. No anomalies are noted. 2. Only genetic amniocentesis can rule out fetal chromosomal anomalies, normal ultrasound does not. 3. She is carrying dichorionic diamniotic twin gestation. There is no risk of fetal fetal transfusion. Twin gestation is associated with an increased risk of:  · Premature delivery. (Cervical length today was reassuring against risk of  delivery.)  · Disturbance in the growth of her babies (Excessive fetal growth is noted with EFW: twin A at the 96th centile, twin B above 99th). Growth is concordant. 4. Obesity is assosiated with an increased risk of developing gestational diabetes, and disturbance in the growth of her baby, such as Large for dates and small for Dates. Gestational diabetes was ruled out with negative glucose tolerance test that was done in your office 2021.   5. She is to continue the thromboprophylactic treatment and continue the treatment with calcium and vitamin D supplementation. 6. She should continue the treatment with baby aspirin 81 mg once per day to delay onset and decrease likelihood of developing preeclampsia. 7. She gives history of having two siblings with skeletal dysplasia. The condition in this situation is genetically inherited (not a de Juanpablo mutation). I discussed with the patient the fact that the condition might be detected antenatally on genetic amniocentesis or on ultrasound done in the second trimester.  I asked the patient if she is interested in prenatal genetic testing for the skelettal dysplasia. She declined it. She said that she wouldn't consider having termination of pregnancy, if 1 or both of her babies have skeletal dysplasia, chromosomal abnormalities birth defects mental or motor retardation. 8. Polyhydramnios is noted on twin B with a deep pocket measuring 10.8 cm. Normal fluid noted around baby A.  9. Fetal well-being was confirmed today. The Biophysical profile score of 10/10 on each baby  is reassuring, and the umbilical artery Doppler studies are normal.  10. She should monitor fetal well-being at home by counting movements after dinner. If she perceives any decrease in movements,  she should call your office immediately. She is also to call, if she develops any headaches, blurred vision, abdominal pain, bleeding, or spotting, which are signs of preeclampsia. 11. Due to the multiple comorbidities, I recommend delivery anytime between 37 and 38 weeks of gestation,( or earlier if there is evidence of fetal jeopardy) after receiving 2 doses of Celestone to accelerate fetal lung maturity. 12. She is to continue to follow with you in your office for ongoing obstetric care, and should be followed up in your office with nonstress test every Friday for remainder of pregnancy. 13. She should continue to be followed up in our [facility] office with biophysical profile umbilical artery Doppler once a week every Tuesday for remainder of pregnancy. Thank you again, doctor, for allowing us to be of service to your patient. If I can be of further assistance, please do not hesitate to call. Sincerely,        Shorty Best M.D., [address] [facility]    Time spent on the encounter was over 15 minutes including counseling  coordination of care and direct face-to-face contact with the patient.     Current encounter billing:  [place] OFFICE OUTPATIENT VISIT 10-19 MINUTES [[id number]]  US OB 1 or More Fetus Limited [80562 Custom]  Biophysical profile [OBO12 Custom] x2  US Doppler Fetal Umbilical Artery [[id number] Custom]  x2    **This report has been created using voice recognition software.  It may contain minor errors     which are inherent in voice recognition technology**

## 2021-08-03 NOTE — LETTER
NON STRESS TEST INTERPRETATION    8/3/21    RE:  Jermaine Albert   : 1995   AGE: 31 y.o. GESTATIONAL AGE:  35w2d    DIAGNOSIS:      Dichorionic diamniotic intrauterine twin gestation. Maternal obesity. Complex thrombophilia. INDICATION:        Twin gestation Dichorionic diamniotic.     TIME ON:  9:24 AM    TIME OFF:  9:45 AM      RESULT:   Twin A :    REACTIVE       Twin B :    REACTIVE       FHR Baseline Rate:   Twin A :    130 bpm       Twin B:     140 bpm    PERIODIC CHANGES:      Twin A :     Accelerations present, variability moderate, no decelerations noted  Twin B :          Accelerations present, variability moderate, no decelerations noted    COMMENTS:      She is to continue having NST's every 3-4 days, and BPP with umbilical artery doppler studies once per week        Bella Cooks, MD

## 2021-08-06 ENCOUNTER — ROUTINE PRENATAL (OUTPATIENT)
Dept: OBGYN CLINIC | Age: 26
End: 2021-08-06
Payer: COMMERCIAL

## 2021-08-06 VITALS
DIASTOLIC BLOOD PRESSURE: 85 MMHG | HEART RATE: 107 BPM | SYSTOLIC BLOOD PRESSURE: 124 MMHG | WEIGHT: 267 LBS | BODY MASS INDEX: 45.58 KG/M2 | HEIGHT: 64 IN

## 2021-08-06 DIAGNOSIS — Z3A.35 35 WEEKS GESTATION OF PREGNANCY: Primary | ICD-10-CM

## 2021-08-06 DIAGNOSIS — O30.043 DICHORIONIC DIAMNIOTIC TWIN PREGNANCY IN THIRD TRIMESTER: ICD-10-CM

## 2021-08-06 LAB
GLUCOSE URINE, POC: NEGATIVE
PROTEIN UA: POSITIVE

## 2021-08-06 PROCEDURE — 81002 URINALYSIS NONAUTO W/O SCOPE: CPT | Performed by: OBSTETRICS & GYNECOLOGY

## 2021-08-06 PROCEDURE — 99999 PR OFFICE/OUTPT VISIT,PROCEDURE ONLY: CPT | Performed by: OBSTETRICS & GYNECOLOGY

## 2021-08-06 PROCEDURE — 99213 OFFICE O/P EST LOW 20 MIN: CPT | Performed by: OBSTETRICS & GYNECOLOGY

## 2021-08-06 PROCEDURE — 59025 FETAL NON-STRESS TEST: CPT | Performed by: OBSTETRICS & GYNECOLOGY

## 2021-08-06 NOTE — PROGRESS NOTES
Patient here for NST  Complaining of heaviness in abdomen.   Denies any bleeding or LOF  +FM noted x2

## 2021-08-06 NOTE — PATIENT INSTRUCTIONS
if you are sick, not feeling well or have an infectious process going on please reschedule your appointment by calling [phone]. Also if any family members are not feeling well, please do not bring them to your appointment. We appreciate your cooperation. We are doing this in order to protect our pregnant mothers+ their babies. Call your primary obstetrician with bleeding, leaking of fluid, abdominal tenderness, headache, blurry vision, epigastric pain and increased urinary frequency. Any questions contact Julia at [phone]. If you are experiencing an emergency and need immediate help, call 911 or go to go emergency room or labor and delivery. Please arrive for your scheduled appointment at least 15 minutes early with your actual insurance card+ a photo ID. Also if you need any refills ordered or have questions, it may take up 48 hours to reply. Please allow ample time for your refills. Call me when you use last refill. Thank you for your cooperation. You might be having an NST at your next appt. Please eat a large snack or breakfast before coming to office. Thank youDo kick counts after dinner. Call your primary obstetrician if less than 10 kicks in 2 hours after dinner. Call your primary obstetrician with bleeding, leaking of fluid, abdominal tenderness, headache, blurry vision, epigastric pain and increased urinary frequency. Patient Education        Weeks 34 to 39 of Your Pregnancy: Care Instructions  Overview     By now, your baby and your belly have grown quite large. It's almost time to give birth! Your baby's lungs are almost ready to breathe air. The skull bones are firm enough to protect your baby's head, but soft enough to move down through the birth canal.  You may be feeling excited and happy at times--but also anxious or scared. You might wonder how you'll know if you're in labor or what to expect during labor. Try to be open and flexible in your expectations of the birth.  Because each birth is different, there's no way to know exactly what childbirth will be like for you. Talk to your doctor or midwife about any concerns you have. If you haven't already had the Tdap shot during this pregnancy, talk to your doctor about getting it. It will help protect your  against pertussis infection. In the 36th week, most women have a test for group B streptococcus (GBS). GBS is a common bacteria that can live in the vagina and rectum. It can make your baby sick after birth. If you test positive, you will get antibiotics during labor. The medicine will help keep your baby from getting the bacteria. Follow-up care is a key part of your treatment and safety. Be sure to make and go to all appointments, and call your doctor if you are having problems. It's also a good idea to know your test results and keep a list of the medicines you take. How can you care for yourself at home? Learn about pain relief choices  · Pain is different for every woman. Talk with your doctor about your feelings about pain. · You can choose from several types of pain relief. These include medicine or breathing techniques, as well as comfort measures. You can use more than one option. · If you choose to have pain medicine during labor, talk to your doctor about your options. Some medicines lower anxiety and help with some of the pain. Others make your lower body numb so that you won't feel pain. · Be sure to tell your doctor about your pain medicine choice before you start labor or very early in your labor. You may be able to change your mind as labor progresses. · Rarely, a woman is put to sleep by medicine given through a mask or an IV. Labor and delivery  · The first stage of labor has three parts: early, active, and transition. ? Most women have early labor at home. You can stay busy or rest, eat light snacks, drink clear fluids, and start counting contractions. ?  When talking during a contraction gets hard, you may be moving to active labor. During active labor, you should head for the hospital if you are not there already. ? You are in active labor when contractions come every 3 to 4 minutes and last about 60 seconds. Your cervix is opening more rapidly. ? If your water breaks, contractions will come faster and stronger. ? During transition, your cervix is stretching, and contractions are coming more rapidly. ? You may want to push, but your cervix might not be ready. Your doctor will tell you when to push. · The second stage starts when your cervix is completely opened and you are ready to push. ? Contractions are very strong to push the baby down the birth canal.  ? You will feel the urge to push. You may feel like you need to have a bowel movement. ? You may be coached to push with contractions. These contractions will be very strong, but you will not have them as often. You can get a little rest between contractions. ? You may be emotional and irritable. You may not be aware of what is going on around you.  ? One last push, and your baby is born. · The third stage is when a few more contractions push out the placenta. This may take 30 minutes or less. · The fourth stage is the welcome recovery. You may feel overwhelmed with emotions and exhausted but alert. This is a good time to start breastfeeding. Where can you learn more? Go to https://LamahuipePixowl.HealthyTweet. org and sign in to your [employer] account. Enter J993 in the [facility] box to learn more about \"Weeks 34 to 36 of Your Pregnancy: Care Instructions. \"     If you do not have an account, please click on the \"Sign Up Now\" link. Current as of: October 8, 2020               Content Version: 12.9  © 5723-4095 Healthwise, Incorporated. Care instructions adapted under license by [facility] [employer] [facility] ([facility]).  If you have questions about a medical condition or this instruction, always ask your healthcare professional. [address] any warranty or liability for your use of this information. Patient Education        Learning About When to Call Your Doctor During Pregnancy (After 20 Weeks)  Your Care Instructions  It's common to have concerns about what might be a problem during pregnancy. Although most pregnant women don't have any serious problems, it's important to know when to call your doctor if you have certain symptoms or signs of labor. These are general suggestions. Your doctor may give you some more information about when to call. When to call your doctor (after 20 weeks)  Call 911  anytime you think you may need emergency care. For example, call if:  · You have severe vaginal bleeding. · You have sudden, severe pain in your belly. · You passed out (lost consciousness). · You have a seizure. · You see or feel the umbilical cord. · You think you are about to deliver your baby and can't make it safely to the hospital.  Call your doctor now or seek immediate medical care if:  · You have vaginal bleeding. · You have belly pain. · You have a fever. · You have symptoms of preeclampsia, such as:  ? Sudden swelling of your face, hands, or feet. ? New vision problems (such as dimness, blurring, or seeing spots). ? A severe headache. · You have a sudden release of fluid from your vagina. (You think your water broke.)  · You think that you may be in labor. This means that you've had at least 6 contractions in an hour. · You notice that your baby has stopped moving or is moving much less than normal.  · You have symptoms of a urinary tract infection. These may include:  ? Pain or burning when you urinate. ? A frequent need to urinate without being able to pass much urine. ? Pain in the flank, which is just below the rib cage and above the waist on either side of the back. ? Blood in your urine.   Watch closely for changes in your health, and be sure to contact your doctor if:  · You have vaginal discharge that smells bad.  · You have skin changes, such as:  ? A rash. ? Itching. ? Yellow color to your skin. · You have other concerns about your pregnancy. If you have labor signs at 37 weeks or more  If you have signs of labor at 37 weeks or more, your doctor may tell you to call when your labor becomes more active. Symptoms of active labor include:  · Contractions that are regular. · Contractions that are less than 5 minutes apart. · Contractions that are hard to talk through. Follow-up care is a key part of your treatment and safety. Be sure to make and go to all appointments, and call your doctor if you are having problems. It's also a good idea to know your test results and keep a list of the medicines you take. Where can you learn more? Go to https://Geelbe.Weemba. org and sign in to your [employer] account. Enter  in the [facility] box to learn more about \"Learning About When to Call Your Doctor During Pregnancy (After 20 Weeks). \"     If you do not have an account, please click on the \"Sign Up Now\" link. Current as of: October 8, 2020               Content Version: 12.9  © 2514-4555 Healthwise, [employer]. Care instructions adapted under license by [facility] ([facility]). If you have questions about a medical condition or this instruction, always ask your healthcare professional. [address] any warranty or liability for your use of this information. Patient Education        Learning About Twin Pregnancy  What is different about a twin pregnancy? In many ways, a twin pregnancy is like a single-baby pregnancy. Healthy twins develop like a single baby does until the last trimester, when their growth slows down. Twins are usually born before the usual 40-week due date. For the mother, carrying twins can be more difficult than carrying a single baby. And her risks are higher for pregnancy problems.  That's why keeping up with prenatal checks and tests is especially important. How do twins grow? Twins develop from embryos to babies like a single baby does. · By weeks 10 to 14 of your pregnancy, one or two placentas have formed inside your uterus. It is possible to hear your babies' heartbeats with a special ultrasound device. Your babies' eyes can move. Their arms and legs can bend. · Around weeks 14 to 18, hair is beginning to grow on your babies' heads. · By 18 to 22 weeks, your babies can now suck their thumbs. Around this time, you may start to feel your babies move. At first, this might feel like fluttering or \"butterflies. \"  · Around week 26, your babies can open and close their eyelids. You may notice that they respond to the sound of your or your partner's voice. You may also notice that they do less turning and twisting and more squirming. · Up to 32 weeks, twins grow rapidly. By this point, they really start to look like babies, with hair and plump skin. · Around 32 to 34 weeks, twins' pace of growth slows down. Their lungs become more ready for breathing. This marks a stage when babies born early are less likely to have breathing problems after birth. What can you expect during a twin pregnancy? With a twin pregnancy, your body makes high levels of pregnancy hormones. So morning sickness may come on earlier and stronger than if you were carrying a single baby. You may also have earlier and more intense symptoms from pregnancy, like swelling, heartburn, leg cramps, bladder discomfort, and sleep problems. Your belly may grow bigger, and you may gain more weight, sooner. Talk to your doctor about how much you might expect to gain. When you're carrying twins, you and your babies may be tested and checked more than you would for a single-baby pregnancy. · At about 10 weeks, an ultrasound can show if the babies are growing in the same amniotic sac.  If they each have their own sac and their own placenta, twins have the best chances of both growing well.  · Between weeks 18 and 20, an ultrasound may be able to show the sex of your babies. · Later in your pregnancy, you will start to have checkups more often. This will be sooner than for a single-baby pregnancy. Twins tend to be born early, but 37 weeks is a goal when mother and babies are doing well. As you get closer to delivery time, your medical team will help you know what to expect and what to do. As questions come to mind, keep a list so you can remember to ask your doctor. Follow-up care is a key part of your treatment and safety. Be sure to make and go to all appointments, and call your doctor if you are having problems. It's also a good idea to know your test results and keep a list of the medicines you take. Where can you learn more? Go to https://Spyder LynkpeSeeding Labsserenityeb.First Retail. org and sign in to your [employer] account. Enter D478 in the [employer] box to learn more about \"Learning About Twin Pregnancy. \"     If you do not have an account, please click on the \"Sign Up Now\" link. Current as of: October 8, 2020               Content Version: 12.9  © 0709-5778 [employer]. Care instructions adapted under license by [facility] ([facility]). If you have questions about a medical condition or this instruction, always ask your healthcare professional. [address] any warranty or liability for your use of this information. Patient Education        Counting Your Baby's Kicks: Care Instructions  Your Care Instructions     Counting your baby's kicks is one way your doctor can tell that your baby is healthy. Most women--especially in a first pregnancy--feel their baby move for the first time between 16 and 22 weeks. The movement may feel like flutters rather than kicks. Your baby may move more at certain times of the day. When you are active, you may notice less kicking than when you are resting.  At your prenatal visits, your doctor will ask whether the baby is active. In your last trimester, your doctor may ask you to count the number of times you feel your baby move. Follow-up care is a key part of your treatment and safety. Be sure to make and go to all appointments, and call your doctor if you are having problems. It's also a good idea to know your test results and keep a list of the medicines you take. How do you count fetal kicks? · A common method of checking your baby's movement is to count the number of kicks or moves you feel in 1 hour. Ten movements (such as kicks, flutters, or rolls) in 1 hour are normal. Some doctors suggest that you count in the morning until you get to 10 movements. Then you can quit for that day and start again the next day. · Pick your baby's most active time of day to count. This may be any time from morning to evening. · If you do not feel 10 movements in an hour, your baby may be sleeping. Wait for the next hour and count again. When should you call for help? Call your doctor now or seek immediate medical care if:    · You noticed that your baby has stopped moving or is moving much less than normal.   Watch closely for changes in your health, and be sure to contact your doctor if you have any problems. Where can you learn more? Go to https://RentablesÂ®.Polygenta Technologies. org and sign in to your [employer] account. Enter V257 in the [facility] box to learn more about \"Counting Your Baby's Kicks: Care Instructions. \"     If you do not have an account, please click on the \"Sign Up Now\" link. Current as of: October 8, 2020               Content Version: 12.9  © 4453-0686 Healthwise, Incorporated. Care instructions adapted under license by [facility] ([facility]). If you have questions about a medical condition or this instruction, always ask your healthcare professional. [address] any warranty or liability for your use of this information.

## 2021-08-09 NOTE — PROGRESS NOTES
[address] [address] [address] [facility] [address] [address]  Ph: [phone] Fax: [phone]  Aug 6, 2021     RE: Darío Rene   Dear Dr. Jordi Rosa       · Thank you for sending  Ms. Hao Springer for Nonstress test  in our office on     · 27yo Wf  @ 35w6d   · Augie Twins   · She had a reactive, reassuring NST. ? Fetal Heart Rate Baseline 135 bpm (A); 140 (B)   ? Accelerations present , with no decelerations noted . ? Moderate variability  · She noted fetal movement, no cramping or contractions . · The patient is to continue to follow with you in your office for ongoing obstetric care. · Followup visits as scheduled   · Further evaluation and management will be dependent on her clinical presentation and the results of her testing. Once again, thank you for allowing us to participate in the care of this patient and if we can be of any further assistance to you, please do not hesitate to contact us.   Sincerely,        Alexsander Mosley MD

## 2021-08-10 ENCOUNTER — ROUTINE PRENATAL (OUTPATIENT)
Dept: OBGYN CLINIC | Age: 26
DRG: 540 | End: 2021-08-10
Payer: COMMERCIAL

## 2021-08-10 ENCOUNTER — ANCILLARY PROCEDURE (OUTPATIENT)
Dept: OBGYN CLINIC | Age: 26
DRG: 540 | End: 2021-08-10
Payer: COMMERCIAL

## 2021-08-10 VITALS
HEART RATE: 88 BPM | WEIGHT: 268 LBS | HEIGHT: 64 IN | SYSTOLIC BLOOD PRESSURE: 115 MMHG | DIASTOLIC BLOOD PRESSURE: 62 MMHG | BODY MASS INDEX: 45.75 KG/M2

## 2021-08-10 DIAGNOSIS — O99.113 BLOOD COAGULATION DISORDER COMPLICATING PREGNANCY, THIRD TRIMESTER (HCC): ICD-10-CM

## 2021-08-10 DIAGNOSIS — O40.3XX2 POLYHYDRAMNIOS, THIRD TRIMESTER, FETUS 2: ICD-10-CM

## 2021-08-10 DIAGNOSIS — Z79.01 LONG TERM CURRENT USE OF ANTICOAGULANT THERAPY: ICD-10-CM

## 2021-08-10 DIAGNOSIS — O30.043 DICHORIONIC DIAMNIOTIC TWIN PREGNANCY IN THIRD TRIMESTER: Primary | ICD-10-CM

## 2021-08-10 DIAGNOSIS — O99.213 MATERNAL MORBID OBESITY IN THIRD TRIMESTER, ANTEPARTUM (HCC): ICD-10-CM

## 2021-08-10 DIAGNOSIS — E66.01 MATERNAL MORBID OBESITY IN THIRD TRIMESTER, ANTEPARTUM (HCC): ICD-10-CM

## 2021-08-10 DIAGNOSIS — D68.9 BLOOD COAGULATION DISORDER COMPLICATING PREGNANCY, THIRD TRIMESTER (HCC): ICD-10-CM

## 2021-08-10 DIAGNOSIS — Z3A.36 36 WEEKS GESTATION OF PREGNANCY: ICD-10-CM

## 2021-08-10 DIAGNOSIS — O35.2XX0 HEREDITARY FAMILIAL DISEASE AFFECTING MANAGEMENT OF MOTHER AND POSSIBLY AFFECTING FETUS, ANTEPARTUM, SINGLE OR UNSPECIFIED FETUS: ICD-10-CM

## 2021-08-10 DIAGNOSIS — O36.63X2: ICD-10-CM

## 2021-08-10 LAB
GLUCOSE URINE, POC: NORMAL
PROTEIN UA: NEGATIVE

## 2021-08-10 PROCEDURE — 1036F TOBACCO NON-USER: CPT | Performed by: OBSTETRICS & GYNECOLOGY

## 2021-08-10 PROCEDURE — 99212 OFFICE O/P EST SF 10 MIN: CPT | Performed by: OBSTETRICS & GYNECOLOGY

## 2021-08-10 PROCEDURE — 76815 OB US LIMITED FETUS(S): CPT | Performed by: OBSTETRICS & GYNECOLOGY

## 2021-08-10 PROCEDURE — 99213 OFFICE O/P EST LOW 20 MIN: CPT | Performed by: OBSTETRICS & GYNECOLOGY

## 2021-08-10 PROCEDURE — 81002 URINALYSIS NONAUTO W/O SCOPE: CPT | Performed by: OBSTETRICS & GYNECOLOGY

## 2021-08-10 PROCEDURE — G8427 DOCREV CUR MEDS BY ELIG CLIN: HCPCS | Performed by: OBSTETRICS & GYNECOLOGY

## 2021-08-10 PROCEDURE — 76820 UMBILICAL ARTERY ECHO: CPT | Performed by: OBSTETRICS & GYNECOLOGY

## 2021-08-10 PROCEDURE — 76818 FETAL BIOPHYS PROFILE W/NST: CPT | Performed by: OBSTETRICS & GYNECOLOGY

## 2021-08-10 PROCEDURE — G8419 CALC BMI OUT NRM PARAM NOF/U: HCPCS | Performed by: OBSTETRICS & GYNECOLOGY

## 2021-08-10 NOTE — PROGRESS NOTES
NST started @ 0926. Pt instructed to winter fetal movement  FHTS twin A 130s with accels, moderate variability and no decels            Twin B 140s with accels, moderate variability and no decels  Reactive per Dr Mehrdad Colón.  Ended @ 4817

## 2021-08-10 NOTE — LETTER
8/10/21     RE:  Chad Arzola   : 1995   AGE: 31 y.o. REFERRING PHYSICIAN:                  Anai Lewis MD    Dear   Mrs. Chad Arzola a 31 y.o.  Tony Katz  is seen today on follow up in our office. REASON FOR APPOINTMENT:  · Follow-up on a pregnant patient with dichorionic diamniotic twin gestation and thrombophilia. MEDICATIONS:    · Prenatal Vitamins once per day   · Lovenox 40 mg subcutaneous once per day. · Vitamin D and calcium supplement. · Baby aspirin 81 mg once per day. · Iron supplement. INTERVAL HISTORY:  Mrs Chad Arzola had an uneventful course of pregnancy since her last visit to our office. When seen today in our office she had no complaints. PHYSICAL EXAMINATION:  General Appearance:  Healthy looking, alert, no acute distress. Eyes:     No pallor, no icterus, no photophobia. Ears:     No ear drainage. Nose:     No nasal drainage, no paranasal sinus tenderness. Throat:   Mucosa moist, no oral thrush, no exudate. Neck:     No nuchal rigidity. Back:     No CVA tenderness. Abdomen:    Soft nontender. Extremities:    No pretibial pitting edema, no calf muscle tenderness. Skin:     No rashes, no lesions. BP: 115/62 Weight: 268 lb (121.6 kg) Height: 5' 4\" (162.6 cm) Pulse: 88     Body mass index is 46 kg/m². Urine dipstick:  Glucose : Negative   Albumin:  Negative       An ultrasound evaluation was done in our office today. Please refer to the enclosed copy of the ultrasound report for further information. IMPRESSION:  1. A  36w2d Dichorionic diamniotic intrauterine twin gestation. 2. Maternal obesity. 3. Polyhydramnios noted around baby B.  4. Excessive fetal growth (EFW: above 99th percentile on both babies)  5. Family history of skeletal dysplasia (2 siblings delivered at term,  neonatally). 6. Negative Personal History of DVT. 7. Tested because of family history of DVT and pulmonary embolism (her mother)  6.  Received Celestone to accelerate fetal lung maturity (6/21/2021). 9. Received magnesium sulfate for fetal neural protection (6/21/2021). 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 The size of each baby is appropriate for gestational age, adequate concordant growth is noted. No anomalies are noted. 2. Only genetic amniocentesis can rule out fetal chromosomal anomalies, normal ultrasound does not. 3. She is carrying dichorionic diamniotic twin gestation. There is no risk of fetal fetal transfusion. Twin gestation is associated with an increased risk of:  · Disturbance in the growth of her babies (Excessive fetal growth is above the 99th percentile on both babies). Growth is concordant. 4. Obesity is assosiated with an increased risk of developing gestational diabetes, and disturbance in the growth of her baby, such as Large for dates and small for Dates. Gestational diabetes was ruled out with negative glucose tolerance test that was done in your office 6/4/2021.   5. She is to continue the thromboprophylactic treatment and continue the treatment with calcium and vitamin D supplementation. 6. She should continue the treatment with baby aspirin 81 mg once per day to delay onset and decrease likelihood of developing preeclampsia. 7. She gives history of having two siblings with skeletal dysplasia. The condition in this situation is genetically inherited (not a de Juanpablo mutation). I discussed with the patient the fact that the condition might be detected antenatally on genetic amniocentesis or on ultrasound done in the second trimester. I asked the patient if she is interested in prenatal genetic testing for the skelettal dysplasia. She declined it.  She said that she wouldn't consider having termination of pregnancy, if 1 or both of her babies have skeletal dysplasia, chromosomal abnormalities birth defects mental or motor retardation. 8. Polyhydramnios is noted on twin B with a deep pocket measuring 8.6 cm. Normal fluid noted around baby A.  9. Fetal well-being was confirmed today. The Biophysical profile score of 10/10 on each baby  is reassuring, and the umbilical artery Doppler studies are normal.  10. She should monitor fetal well-being at home by counting movements after dinner. If she perceives any decrease in movements,  she should call your office immediately. She is also to call, if she develops any headaches, blurred vision, abdominal pain, bleeding, or spotting, which are signs of preeclampsia. 11. Due to the multiple comorbidities, I recommend delivery anytime between 37 and 38 weeks of gestation,( or earlier if there is evidence of fetal jeopardy) after receiving 2 doses of Celestone to accelerate fetal lung maturity. 12. She is to continue to follow with you in your office for ongoing obstetric care, and should be followed up in your office with nonstress test every Friday for remainder of pregnancy. 13. If there is a need for further ultrasound evaluations in our office please do not hesitate to call our office and schedule an appointment. Thank you again, doctor, for allowing us to be of service to your patient. If I can be of further assistance, please do not hesitate to call. Sincerely,        Bear Mansfield M.D., [address] [facility]    The total time in minutes spent reviewing medical records, reviewing imaging studies, performing ultrasonic imaging, reviewing laboratory testing, and documenting information was over 20  minutes, of which, 50% of the time was spent in patient education, counseling, and coordinating care with the patient, her provider, and/or her family. I answered all of her questions to her satisfaction.     Current encounter billing:  [place] OFFICE OUTPATIENT VISIT 10-19 MINUTES [[id number]]  US OB 1 or More Fetus Limited [04358 Custom]  Biophysical profile [OBO12 Custom] x2  US Doppler Fetal Umbilical Artery [ZCO6805 Custom]  x2    **This report has been created using voice recognition software.  It may contain minor errors     which are inherent in voice recognition technology**

## 2021-08-10 NOTE — LETTER
NON STRESS TEST INTERPRETATION    8/10/21    RE:  Sai Torres   : 1995   AGE: 31 y.o. GESTATIONAL AGE:  36w2d      DIAGNOSIS:      Dichorionic diamniotic intrauterine twin gestation. Maternal obesity. Complex thrombophilia. INDICATION:        Twin gestation Dichorionic diamniotic.     TIME ON:  9:26 AM    TIME OFF:  9:46 AM      RESULT:   Twin A :    REACTIVE       Twin B :    REACTIVE       FHR Baseline Rate:   Twin A :    130 bpm       Twin B:     140 bpm    PERIODIC CHANGES:      Twin A :     Accelerations present, variability moderate, no decelerations noted  Twin B :          Accelerations present, variability moderate, no decelerations noted    COMMENTS:      She is to continue having NST's every 3-4 days, and BPP with umbilical artery doppler studies once per week        Nelly Shelton MD

## 2021-08-10 NOTE — PATIENT INSTRUCTIONS
if you are sick, not feeling well or have an infectious process going on please reschedule your appointment by calling [phone]. Also if any family members are not feeling well, please do not bring them to your appointment. We appreciate your cooperation. We are doing this in order to protect our pregnant mothers+ their babies. Call your primary obstetrician with bleeding, leaking of fluid, abdominal tenderness, headache, blurry vision, epigastric pain and increased urinary frequency. Any questions contact Julia at [phone]. If you are experiencing an emergency and need immediate help, call 911 or go to go emergency room or labor and delivery. Please arrive for your scheduled appointment at least 15 minutes early with your actual insurance card+ a photo ID. Also if you need any refills ordered or have questions, it may take up 48 hours to reply. Please allow ample time for your refills. Call me when you use last refill. Thank you for your cooperation. You might be having an NST at your next appt. Please eat a large snack or breakfast before coming to office. Thank youDo kick counts after dinner. Call your primary obstetrician if less than 10 kicks in 2 hours after dinner. Call your primary obstetrician with bleeding, leaking of fluid, abdominal tenderness, headache, blurry vision, epigastric pain and increased urinary frequency. Patient Education        Weeks 34 to 39 of Your Pregnancy: Care Instructions  Overview     By now, your baby and your belly have grown quite large. It's almost time to give birth! Your baby's lungs are almost ready to breathe air. The skull bones are firm enough to protect your baby's head, but soft enough to move down through the birth canal.  You may be feeling excited and happy at times--but also anxious or scared. You might wonder how you'll know if you're in labor or what to expect during labor. Try to be open and flexible in your expectations of the birth.  Because each birth is different, there's no way to know exactly what childbirth will be like for you. Talk to your doctor or midwife about any concerns you have. If you haven't already had the Tdap shot during this pregnancy, talk to your doctor about getting it. It will help protect your  against pertussis infection. In the 36th week, most women have a test for group B streptococcus (GBS). GBS is a common bacteria that can live in the vagina and rectum. It can make your baby sick after birth. If you test positive, you will get antibiotics during labor. The medicine will help keep your baby from getting the bacteria. Follow-up care is a key part of your treatment and safety. Be sure to make and go to all appointments, and call your doctor if you are having problems. It's also a good idea to know your test results and keep a list of the medicines you take. How can you care for yourself at home? Learn about pain relief choices  · Pain is different for every woman. Talk with your doctor about your feelings about pain. · You can choose from several types of pain relief. These include medicine or breathing techniques, as well as comfort measures. You can use more than one option. · If you choose to have pain medicine during labor, talk to your doctor about your options. Some medicines lower anxiety and help with some of the pain. Others make your lower body numb so that you won't feel pain. · Be sure to tell your doctor about your pain medicine choice before you start labor or very early in your labor. You may be able to change your mind as labor progresses. · Rarely, a woman is put to sleep by medicine given through a mask or an IV. Labor and delivery  · The first stage of labor has three parts: early, active, and transition. ? Most women have early labor at home. You can stay busy or rest, eat light snacks, drink clear fluids, and start counting contractions. ?  When talking during a contraction gets hard, you may be moving to active labor. During active labor, you should head for the hospital if you are not there already. ? You are in active labor when contractions come every 3 to 4 minutes and last about 60 seconds. Your cervix is opening more rapidly. ? If your water breaks, contractions will come faster and stronger. ? During transition, your cervix is stretching, and contractions are coming more rapidly. ? You may want to push, but your cervix might not be ready. Your doctor will tell you when to push. · The second stage starts when your cervix is completely opened and you are ready to push. ? Contractions are very strong to push the baby down the birth canal.  ? You will feel the urge to push. You may feel like you need to have a bowel movement. ? You may be coached to push with contractions. These contractions will be very strong, but you will not have them as often. You can get a little rest between contractions. ? You may be emotional and irritable. You may not be aware of what is going on around you.  ? One last push, and your baby is born. · The third stage is when a few more contractions push out the placenta. This may take 30 minutes or less. · The fourth stage is the welcome recovery. You may feel overwhelmed with emotions and exhausted but alert. This is a good time to start breastfeeding. Where can you learn more? Go to https://Mycroft Inc.jacqueThe DoBand Campaign.[employer]. org and sign in to your [employer] account. Enter E301 in the [facility] box to learn more about \"Weeks 34 to 36 of Your Pregnancy: Care Instructions. \"     If you do not have an account, please click on the \"Sign Up Now\" link. Current as of: October 8, 2020               Content Version: 12.9  © 9303-1881 Healthwise, Incorporated. Care instructions adapted under license by [facility] [employer] [facility] ([facility]).  If you have questions about a medical condition or this instruction, always ask your healthcare professional. [address] any warranty or liability for your use of this information. Patient Education        Learning About When to Call Your Doctor During Pregnancy (After 20 Weeks)  Your Care Instructions  It's common to have concerns about what might be a problem during pregnancy. Although most pregnant women don't have any serious problems, it's important to know when to call your doctor if you have certain symptoms or signs of labor. These are general suggestions. Your doctor may give you some more information about when to call. When to call your doctor (after 20 weeks)  Call 911  anytime you think you may need emergency care. For example, call if:  · You have severe vaginal bleeding. · You have sudden, severe pain in your belly. · You passed out (lost consciousness). · You have a seizure. · You see or feel the umbilical cord. · You think you are about to deliver your baby and can't make it safely to the hospital.  Call your doctor now or seek immediate medical care if:  · You have vaginal bleeding. · You have belly pain. · You have a fever. · You have symptoms of preeclampsia, such as:  ? Sudden swelling of your face, hands, or feet. ? New vision problems (such as dimness, blurring, or seeing spots). ? A severe headache. · You have a sudden release of fluid from your vagina. (You think your water broke.)  · You think that you may be in labor. This means that you've had at least 6 contractions in an hour. · You notice that your baby has stopped moving or is moving much less than normal.  · You have symptoms of a urinary tract infection. These may include:  ? Pain or burning when you urinate. ? A frequent need to urinate without being able to pass much urine. ? Pain in the flank, which is just below the rib cage and above the waist on either side of the back. ? Blood in your urine.   Watch closely for changes in your health, and be sure to contact your doctor if:  · You have vaginal discharge that smells bad.  · You have skin changes, such as:  ? A rash. ? Itching. ? Yellow color to your skin. · You have other concerns about your pregnancy. If you have labor signs at 37 weeks or more  If you have signs of labor at 37 weeks or more, your doctor may tell you to call when your labor becomes more active. Symptoms of active labor include:  · Contractions that are regular. · Contractions that are less than 5 minutes apart. · Contractions that are hard to talk through. Follow-up care is a key part of your treatment and safety. Be sure to make and go to all appointments, and call your doctor if you are having problems. It's also a good idea to know your test results and keep a list of the medicines you take. Where can you learn more? Go to https://[employer]i [employer].ArgoPay. org and sign in to your [employer] account. Enter  in the [employer] box to learn more about \"Learning About When to Call Your Doctor During Pregnancy (After 20 Weeks). \"     If you do not have an account, please click on the \"Sign Up Now\" link. Current as of: October 8, 2020               Content Version: 12.9  © 9217-7707 Healthwise, [employer]. Care instructions adapted under license by [facility]. If you have questions about a medical condition or this instruction, always ask your healthcare professional. [address] any warranty or liability for your use of this information. Patient Education        Counting Your Baby's Kicks: Care Instructions  Your Care Instructions     Counting your baby's kicks is one way your doctor can tell that your baby is healthy. Most women--especially in a first pregnancy--feel their baby move for the first time between 16 and 22 weeks. The movement may feel like flutters rather than kicks. Your baby may move more at certain times of the day. When you are active, you may notice less kicking than when you are resting.  At your prenatal visits, your doctor will ask whether the baby is active. In your last trimester, your doctor may ask you to count the number of times you feel your baby move. Follow-up care is a key part of your treatment and safety. Be sure to make and go to all appointments, and call your doctor if you are having problems. It's also a good idea to know your test results and keep a list of the medicines you take. How do you count fetal kicks? · A common method of checking your baby's movement is to count the number of kicks or moves you feel in 1 hour. Ten movements (such as kicks, flutters, or rolls) in 1 hour are normal. Some doctors suggest that you count in the morning until you get to 10 movements. Then you can quit for that day and start again the next day. · Pick your baby's most active time of day to count. This may be any time from morning to evening. · If you do not feel 10 movements in an hour, your baby may be sleeping. Wait for the next hour and count again. When should you call for help? Call your doctor now or seek immediate medical care if:    · You noticed that your baby has stopped moving or is moving much less than normal.   Watch closely for changes in your health, and be sure to contact your doctor if you have any problems. Where can you learn more? Go to https://Map Decisions.Aptidata. org and sign in to your [employer] account. Enter M252 in the [facility] box to learn more about \"Counting Your Baby's Kicks: Care Instructions. \"     If you do not have an account, please click on the \"Sign Up Now\" link. Current as of: October 8, 2020               Content Version: 12.9  © 2006-2021 Healthwise, [employer]. Care instructions adapted under license by [facility] ([facility]). If you have questions about a medical condition or this instruction, always ask your healthcare professional. [place] [address] any warranty or liability for your use of this information.          Patient Education        Learning discomfort, and sleep problems. Your belly may grow bigger, and you may gain more weight, sooner. Talk to your doctor about how much you might expect to gain. When you're carrying twins, you and your babies may be tested and checked more than you would for a single-baby pregnancy. · At about 10 weeks, an ultrasound can show if the babies are growing in the same amniotic sac. If they each have their own sac and their own placenta, twins have the best chances of both growing well. · Between weeks 18 and 20, an ultrasound may be able to show the sex of your babies. · Later in your pregnancy, you will start to have checkups more often. This will be sooner than for a single-baby pregnancy. Twins tend to be born early, but 37 weeks is a goal when mother and babies are doing well. As you get closer to delivery time, your medical team will help you know what to expect and what to do. As questions come to mind, keep a list so you can remember to ask your doctor. Follow-up care is a key part of your treatment and safety. Be sure to make and go to all appointments, and call your doctor if you are having problems. It's also a good idea to know your test results and keep a list of the medicines you take. Where can you learn more? Go to https://Probe Scientific.Gateway EDI. org and sign in to your [employer] account. Enter H380 in the [employer] box to learn more about \"Learning About Twin Pregnancy. \"     If you do not have an account, please click on the \"Sign Up Now\" link. Current as of: October 8, 2020               Content Version: 12.9  © 1589-9886 Healthwise, Incorporated. Care instructions adapted under license by [facility] ([facility]). If you have questions about a medical condition or this instruction, always ask your healthcare professional. [place] [address] any warranty or liability for your use of this information.

## 2021-08-10 NOTE — PROGRESS NOTES
accelerate fetal lung maturity (6/21/2021). 9. Received magnesium sulfate for fetal neural protection (6/21/2021). 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 The size of each baby is appropriate for gestational age, adequate concordant growth is noted. No anomalies are noted. 2. Only genetic amniocentesis can rule out fetal chromosomal anomalies, normal ultrasound does not. 3. She is carrying dichorionic diamniotic twin gestation. There is no risk of fetal fetal transfusion. Twin gestation is associated with an increased risk of:  · Disturbance in the growth of her babies (Excessive fetal growth is above the 99th percentile on both babies). Growth is concordant. 4. Obesity is assosiated with an increased risk of developing gestational diabetes, and disturbance in the growth of her baby, such as Large for dates and small for Dates. Gestational diabetes was ruled out with negative glucose tolerance test that was done in your office 6/4/2021.   5. She is to continue the thromboprophylactic treatment and continue the treatment with calcium and vitamin D supplementation. 6. She should continue the treatment with baby aspirin 81 mg once per day to delay onset and decrease likelihood of developing preeclampsia. 7. She gives history of having two siblings with skeletal dysplasia. The condition in this situation is genetically inherited (not a de Juanpablo mutation). I discussed with the patient the fact that the condition might be detected antenatally on genetic amniocentesis or on ultrasound done in the second trimester. I asked the patient if she is interested in prenatal genetic testing for the skelettal dysplasia. She declined it.  She said that she wouldn't consider having termination of pregnancy, if 1 or both of her babies have skeletal dysplasia, chromosomal abnormalities birth defects mental or motor retardation. 8. Polyhydramnios is noted on twin B with a deep pocket measuring 8.6 cm. Normal fluid noted around baby A.  9. Fetal well-being was confirmed today. The Biophysical profile score of 10/10 on each baby  is reassuring, and the umbilical artery Doppler studies are normal.  10. She should monitor fetal well-being at home by counting movements after dinner. If she perceives any decrease in movements,  she should call your office immediately. She is also to call, if she develops any headaches, blurred vision, abdominal pain, bleeding, or spotting, which are signs of preeclampsia. 11. Due to the multiple comorbidities, I recommend delivery anytime between 37 and 38 weeks of gestation,( or earlier if there is evidence of fetal jeopardy) after receiving 2 doses of Celestone to accelerate fetal lung maturity. 12. She is to continue to follow with you in your office for ongoing obstetric care, and should be followed up in your office with nonstress test every Friday for remainder of pregnancy. 13. If there is a need for further ultrasound evaluations in our office please do not hesitate to call our office and schedule an appointment. Thank you again, doctor, for allowing us to be of service to your patient. If I can be of further assistance, please do not hesitate to call. Sincerely,        Remi De Paz M.D., [facility]    The total time in minutes spent reviewing medical records, reviewing imaging studies, performing ultrasonic imaging, reviewing laboratory testing, and documenting information was over 20  minutes, of which, 50% of the time was spent in patient education, counseling, and coordinating care with the patient, her provider, and/or her family. I answered all of her questions to her satisfaction.     Current encounter billing:  DE OFFICE OUTPATIENT VISIT 10-19 MINUTES [[id number]]  US OB 1 or More Fetus Limited [42822 Custom]  Biophysical profile [OBO12 Custom] x2  US Doppler Fetal Umbilical Artery [[id number] Custom]  x2    **This report has been created using voice recognition software. It may contain minor errors     which are inherent in voice recognition technology**                  NON STRESS TEST INTERPRETATION    8/10/21    RE:  Radha Reyes   : 1995   AGE: 31 y.o. GESTATIONAL AGE:  36w2d      DIAGNOSIS:      Dichorionic diamniotic intrauterine twin gestation. Maternal obesity. Complex thrombophilia. INDICATION:        Twin gestation Dichorionic diamniotic.     TIME ON:  9:26 AM    TIME OFF:  9:46 AM      RESULT:   Twin A :    REACTIVE       Twin B :    REACTIVE       FHR Baseline Rate:   Twin A :    130 bpm       Twin B:     140 bpm    PERIODIC CHANGES:      Twin A :     Accelerations present, variability moderate, no decelerations noted  Twin B :          Accelerations present, variability moderate, no decelerations noted    COMMENTS:      She is to continue having NST's every 3-4 days, and BPP with umbilical artery doppler studies once per week        Aracely Jolley MD

## 2021-08-12 ENCOUNTER — ANESTHESIA EVENT (OUTPATIENT)
Dept: LABOR AND DELIVERY | Age: 26
DRG: 540 | End: 2021-08-12
Payer: COMMERCIAL

## 2021-08-12 ENCOUNTER — HOSPITAL ENCOUNTER (INPATIENT)
Age: 26
LOS: 2 days | Discharge: HOME | DRG: 540 | End: 2021-08-14
Attending: OBSTETRICS & GYNECOLOGY | Admitting: OBSTETRICS & GYNECOLOGY
Payer: COMMERCIAL

## 2021-08-12 ENCOUNTER — ANESTHESIA (OUTPATIENT)
Dept: LABOR AND DELIVERY | Age: 26
DRG: 540 | End: 2021-08-12
Payer: COMMERCIAL

## 2021-08-12 VITALS — DIASTOLIC BLOOD PRESSURE: 50 MMHG | SYSTOLIC BLOOD PRESSURE: 104 MMHG | OXYGEN SATURATION: 95 %

## 2021-08-12 DIAGNOSIS — G89.18 POST-OP PAIN: Primary | ICD-10-CM

## 2021-08-12 PROBLEM — Z34.93 NORMAL PREGNANCY, THIRD TRIMESTER: Status: ACTIVE | Noted: 2021-08-12

## 2021-08-12 LAB
ABO/RH: NORMAL
AMNISURE, POC: POSITIVE
AMPHETAMINE SCREEN, URINE: NOT DETECTED
ANTIBODY SCREEN: NORMAL
BARBITURATE SCREEN URINE: NOT DETECTED
BENZODIAZEPINE SCREEN, URINE: NOT DETECTED
CANNABINOID SCREEN URINE: NOT DETECTED
COCAINE METABOLITE SCREEN URINE: NOT DETECTED
FENTANYL SCREEN, URINE: NOT DETECTED
HCT VFR BLD CALC: 34.6 % (ref 34–48)
HEMOGLOBIN: 11 G/DL (ref 11.5–15.5)
Lab: NORMAL
Lab: NORMAL
MCH RBC QN AUTO: 28.9 PG (ref 26–35)
MCHC RBC AUTO-ENTMCNC: 31.8 % (ref 32–34.5)
MCV RBC AUTO: 90.8 FL (ref 80–99.9)
METHADONE SCREEN, URINE: NOT DETECTED
NEGATIVE QC PASS/FAIL: NORMAL
OPIATE SCREEN URINE: NOT DETECTED
OXYCODONE URINE: NOT DETECTED
PDW BLD-RTO: 15.4 FL (ref 11.5–15)
PHENCYCLIDINE SCREEN URINE: NOT DETECTED
PLATELET # BLD: 216 E9/L (ref 130–450)
PMV BLD AUTO: 10.7 FL (ref 7–12)
POSITIVE QC PASS/FAIL: NORMAL
RBC # BLD: 3.81 E12/L (ref 3.5–5.5)
WBC # BLD: 7.9 E9/L (ref 4.5–11.5)

## 2021-08-12 PROCEDURE — 6370000000 HC RX 637 (ALT 250 FOR IP)

## 2021-08-12 PROCEDURE — 7100000000 HC PACU RECOVERY - FIRST 15 MIN: Performed by: OBSTETRICS & GYNECOLOGY

## 2021-08-12 PROCEDURE — 6360000002 HC RX W HCPCS: Performed by: ANESTHESIOLOGY

## 2021-08-12 PROCEDURE — 99231 SBSQ HOSP IP/OBS SF/LOW 25: CPT | Performed by: NURSE PRACTITIONER

## 2021-08-12 PROCEDURE — 80307 DRUG TEST PRSMV CHEM ANLYZR: CPT

## 2021-08-12 PROCEDURE — 88307 TISSUE EXAM BY PATHOLOGIST: CPT

## 2021-08-12 PROCEDURE — 6370000000 HC RX 637 (ALT 250 FOR IP): Performed by: OBSTETRICS & GYNECOLOGY

## 2021-08-12 PROCEDURE — 2580000003 HC RX 258: Performed by: OBSTETRICS & GYNECOLOGY

## 2021-08-12 PROCEDURE — 6360000002 HC RX W HCPCS: Performed by: OBSTETRICS & GYNECOLOGY

## 2021-08-12 PROCEDURE — 2580000003 HC RX 258

## 2021-08-12 PROCEDURE — 2500000003 HC RX 250 WO HCPCS

## 2021-08-12 PROCEDURE — 86850 RBC ANTIBODY SCREEN: CPT

## 2021-08-12 PROCEDURE — 86900 BLOOD TYPING SEROLOGIC ABO: CPT

## 2021-08-12 PROCEDURE — 2780000010 HC IMPLANT OTHER: Performed by: OBSTETRICS & GYNECOLOGY

## 2021-08-12 PROCEDURE — 2709999900 HC NON-CHARGEABLE SUPPLY: Performed by: OBSTETRICS & GYNECOLOGY

## 2021-08-12 PROCEDURE — 3700000001 HC ADD 15 MINUTES (ANESTHESIA): Performed by: OBSTETRICS & GYNECOLOGY

## 2021-08-12 PROCEDURE — 1220000000 HC SEMI PRIVATE OB R&B

## 2021-08-12 PROCEDURE — 85027 COMPLETE CBC AUTOMATED: CPT

## 2021-08-12 PROCEDURE — 86901 BLOOD TYPING SEROLOGIC RH(D): CPT

## 2021-08-12 PROCEDURE — 7100000001 HC PACU RECOVERY - ADDTL 15 MIN: Performed by: OBSTETRICS & GYNECOLOGY

## 2021-08-12 PROCEDURE — 3700000000 HC ANESTHESIA ATTENDED CARE: Performed by: OBSTETRICS & GYNECOLOGY

## 2021-08-12 PROCEDURE — 3609079900 HC CESAREAN SECTION: Performed by: OBSTETRICS & GYNECOLOGY

## 2021-08-12 PROCEDURE — 36415 COLL VENOUS BLD VENIPUNCTURE: CPT

## 2021-08-12 PROCEDURE — 6360000002 HC RX W HCPCS

## 2021-08-12 PROCEDURE — 59514 CESAREAN DELIVERY ONLY: CPT | Performed by: STUDENT IN AN ORGANIZED HEALTH CARE EDUCATION/TRAINING PROGRAM

## 2021-08-12 RX ORDER — ROCURONIUM BROMIDE 10 MG/ML
INJECTION, SOLUTION INTRAVENOUS PRN
Status: DISCONTINUED | OUTPATIENT
Start: 2021-08-12 | End: 2021-08-12 | Stop reason: SDUPTHER

## 2021-08-12 RX ORDER — TRISODIUM CITRATE DIHYDRATE AND CITRIC ACID MONOHYDRATE 500; 334 MG/5ML; MG/5ML
30 SOLUTION ORAL ONCE
Status: COMPLETED | OUTPATIENT
Start: 2021-08-12 | End: 2021-08-12

## 2021-08-12 RX ORDER — NEOSTIGMINE METHYLSULFATE 0.5 MG/ML
INJECTION, SOLUTION INTRAVENOUS PRN
Status: DISCONTINUED | OUTPATIENT
Start: 2021-08-12 | End: 2021-08-12 | Stop reason: SDUPTHER

## 2021-08-12 RX ORDER — FENTANYL CITRATE 50 UG/ML
25 INJECTION, SOLUTION INTRAMUSCULAR; INTRAVENOUS EVERY 5 MIN PRN
Status: DISCONTINUED | OUTPATIENT
Start: 2021-08-12 | End: 2021-08-12

## 2021-08-12 RX ORDER — SODIUM CHLORIDE 9 MG/ML
25 INJECTION, SOLUTION INTRAVENOUS PRN
Status: DISCONTINUED | OUTPATIENT
Start: 2021-08-12 | End: 2021-08-12 | Stop reason: SDUPTHER

## 2021-08-12 RX ORDER — FENTANYL CITRATE 50 UG/ML
INJECTION, SOLUTION INTRAMUSCULAR; INTRAVENOUS PRN
Status: DISCONTINUED | OUTPATIENT
Start: 2021-08-12 | End: 2021-08-12 | Stop reason: SDUPTHER

## 2021-08-12 RX ORDER — MEPERIDINE HYDROCHLORIDE 25 MG/ML
25 INJECTION INTRAMUSCULAR; INTRAVENOUS; SUBCUTANEOUS EVERY 4 HOURS PRN
Status: DISPENSED | OUTPATIENT
Start: 2021-08-12 | End: 2021-08-13

## 2021-08-12 RX ORDER — IBUPROFEN 800 MG/1
800 TABLET ORAL EVERY 8 HOURS
Status: DISCONTINUED | OUTPATIENT
Start: 2021-08-12 | End: 2021-08-14 | Stop reason: HOSPADM

## 2021-08-12 RX ORDER — TRISODIUM CITRATE DIHYDRATE AND CITRIC ACID MONOHYDRATE 500; 334 MG/5ML; MG/5ML
SOLUTION ORAL
Status: COMPLETED
Start: 2021-08-12 | End: 2021-08-12

## 2021-08-12 RX ORDER — SODIUM CHLORIDE 0.9 % (FLUSH) 0.9 %
10 SYRINGE (ML) INJECTION PRN
Status: DISCONTINUED | OUTPATIENT
Start: 2021-08-12 | End: 2021-08-12 | Stop reason: SDUPTHER

## 2021-08-12 RX ORDER — GLYCOPYRROLATE 0.2 MG/ML
INJECTION INTRAMUSCULAR; INTRAVENOUS PRN
Status: DISCONTINUED | OUTPATIENT
Start: 2021-08-12 | End: 2021-08-12 | Stop reason: SDUPTHER

## 2021-08-12 RX ORDER — SODIUM CHLORIDE, SODIUM LACTATE, POTASSIUM CHLORIDE, AND CALCIUM CHLORIDE .6; .31; .03; .02 G/100ML; G/100ML; G/100ML; G/100ML
1000 INJECTION, SOLUTION INTRAVENOUS ONCE
Status: COMPLETED | OUTPATIENT
Start: 2021-08-12 | End: 2021-08-12

## 2021-08-12 RX ORDER — OXYCODONE HYDROCHLORIDE AND ACETAMINOPHEN 5; 325 MG/1; MG/1
2 TABLET ORAL EVERY 6 HOURS PRN
Status: DISCONTINUED | OUTPATIENT
Start: 2021-08-13 | End: 2021-08-14 | Stop reason: HOSPADM

## 2021-08-12 RX ORDER — OXYCODONE HYDROCHLORIDE AND ACETAMINOPHEN 5; 325 MG/1; MG/1
2 TABLET ORAL EVERY 6 HOURS PRN
Status: DISCONTINUED | OUTPATIENT
Start: 2021-08-12 | End: 2021-08-12

## 2021-08-12 RX ORDER — SODIUM CHLORIDE 0.9 % (FLUSH) 0.9 %
5-40 SYRINGE (ML) INJECTION PRN
Status: DISCONTINUED | OUTPATIENT
Start: 2021-08-12 | End: 2021-08-14 | Stop reason: HOSPADM

## 2021-08-12 RX ORDER — PROPOFOL 10 MG/ML
INJECTION, EMULSION INTRAVENOUS PRN
Status: DISCONTINUED | OUTPATIENT
Start: 2021-08-12 | End: 2021-08-12 | Stop reason: SDUPTHER

## 2021-08-12 RX ORDER — SODIUM CHLORIDE, SODIUM LACTATE, POTASSIUM CHLORIDE, CALCIUM CHLORIDE 600; 310; 30; 20 MG/100ML; MG/100ML; MG/100ML; MG/100ML
INJECTION, SOLUTION INTRAVENOUS CONTINUOUS
Status: DISCONTINUED | OUTPATIENT
Start: 2021-08-12 | End: 2021-08-12 | Stop reason: SDUPTHER

## 2021-08-12 RX ORDER — MODIFIED LANOLIN
OINTMENT (GRAM) TOPICAL
Status: DISCONTINUED | OUTPATIENT
Start: 2021-08-12 | End: 2021-08-14 | Stop reason: HOSPADM

## 2021-08-12 RX ORDER — SUCCINYLCHOLINE/SOD CL,ISO/PF 200MG/10ML
SYRINGE (ML) INTRAVENOUS PRN
Status: DISCONTINUED | OUTPATIENT
Start: 2021-08-12 | End: 2021-08-12 | Stop reason: SDUPTHER

## 2021-08-12 RX ORDER — SODIUM CHLORIDE 0.9 % (FLUSH) 0.9 %
5-40 SYRINGE (ML) INJECTION EVERY 12 HOURS SCHEDULED
Status: DISCONTINUED | OUTPATIENT
Start: 2021-08-12 | End: 2021-08-14 | Stop reason: HOSPADM

## 2021-08-12 RX ORDER — MORPHINE SULFATE 4 MG/ML
INJECTION, SOLUTION INTRAMUSCULAR; INTRAVENOUS PRN
Status: DISCONTINUED | OUTPATIENT
Start: 2021-08-12 | End: 2021-08-12 | Stop reason: SDUPTHER

## 2021-08-12 RX ORDER — SODIUM CHLORIDE, SODIUM LACTATE, POTASSIUM CHLORIDE, CALCIUM CHLORIDE 600; 310; 30; 20 MG/100ML; MG/100ML; MG/100ML; MG/100ML
INJECTION, SOLUTION INTRAVENOUS CONTINUOUS PRN
Status: DISCONTINUED | OUTPATIENT
Start: 2021-08-12 | End: 2021-08-12 | Stop reason: SDUPTHER

## 2021-08-12 RX ORDER — DOCUSATE SODIUM 100 MG/1
100 CAPSULE, LIQUID FILLED ORAL 2 TIMES DAILY
Status: DISCONTINUED | OUTPATIENT
Start: 2021-08-12 | End: 2021-08-14 | Stop reason: HOSPADM

## 2021-08-12 RX ORDER — PHENYLEPHRINE HCL IN 0.9% NACL 1 MG/10 ML
SYRINGE (ML) INTRAVENOUS PRN
Status: DISCONTINUED | OUTPATIENT
Start: 2021-08-12 | End: 2021-08-12 | Stop reason: SDUPTHER

## 2021-08-12 RX ORDER — ACETAMINOPHEN 325 MG/1
650 TABLET ORAL EVERY 4 HOURS PRN
Status: DISCONTINUED | OUTPATIENT
Start: 2021-08-12 | End: 2021-08-14 | Stop reason: HOSPADM

## 2021-08-12 RX ORDER — SODIUM CHLORIDE 9 MG/ML
25 INJECTION, SOLUTION INTRAVENOUS PRN
Status: DISCONTINUED | OUTPATIENT
Start: 2021-08-12 | End: 2021-08-14 | Stop reason: HOSPADM

## 2021-08-12 RX ORDER — SODIUM CHLORIDE, SODIUM LACTATE, POTASSIUM CHLORIDE, CALCIUM CHLORIDE 600; 310; 30; 20 MG/100ML; MG/100ML; MG/100ML; MG/100ML
INJECTION, SOLUTION INTRAVENOUS CONTINUOUS
Status: DISCONTINUED | OUTPATIENT
Start: 2021-08-12 | End: 2021-08-14 | Stop reason: HOSPADM

## 2021-08-12 RX ORDER — SODIUM CHLORIDE 0.9 % (FLUSH) 0.9 %
10 SYRINGE (ML) INJECTION EVERY 12 HOURS SCHEDULED
Status: DISCONTINUED | OUTPATIENT
Start: 2021-08-12 | End: 2021-08-12 | Stop reason: SDUPTHER

## 2021-08-12 RX ADMIN — SODIUM CHLORIDE, POTASSIUM CHLORIDE, SODIUM LACTATE AND CALCIUM CHLORIDE 1000 ML: 600; 310; 30; 20 INJECTION, SOLUTION INTRAVENOUS at 06:45

## 2021-08-12 RX ADMIN — HYDROMORPHONE HYDROCHLORIDE 0.5 MG: 1 INJECTION, SOLUTION INTRAMUSCULAR; INTRAVENOUS; SUBCUTANEOUS at 08:48

## 2021-08-12 RX ADMIN — HYDROMORPHONE HYDROCHLORIDE 0.5 MG: 1 INJECTION, SOLUTION INTRAMUSCULAR; INTRAVENOUS; SUBCUTANEOUS at 10:13

## 2021-08-12 RX ADMIN — SODIUM CHLORIDE, PRESERVATIVE FREE 10 ML: 5 INJECTION INTRAVENOUS at 20:41

## 2021-08-12 RX ADMIN — Medication 100 MCG: at 07:56

## 2021-08-12 RX ADMIN — FENTANYL CITRATE 50 MCG: 50 INJECTION, SOLUTION INTRAMUSCULAR; INTRAVENOUS at 08:14

## 2021-08-12 RX ADMIN — Medication 100 MCG: at 07:52

## 2021-08-12 RX ADMIN — Medication 200 MCG: at 07:46

## 2021-08-12 RX ADMIN — MORPHINE SULFATE 2 MG: 4 INJECTION, SOLUTION INTRAMUSCULAR; INTRAVENOUS at 08:17

## 2021-08-12 RX ADMIN — IBUPROFEN 800 MG: 800 TABLET, FILM COATED ORAL at 14:36

## 2021-08-12 RX ADMIN — HYDROMORPHONE HYDROCHLORIDE 0.5 MG: 1 INJECTION, SOLUTION INTRAMUSCULAR; INTRAVENOUS; SUBCUTANEOUS at 09:13

## 2021-08-12 RX ADMIN — TRISODIUM CITRATE DIHYDRATE AND CITRIC ACID MONOHYDRATE 30 ML: 500; 334 SOLUTION ORAL at 07:09

## 2021-08-12 RX ADMIN — Medication 100 MCG: at 07:50

## 2021-08-12 RX ADMIN — NEOSTIGMINE METHYLSULFATE 3 MG: 0.5 INJECTION, SOLUTION INTRAVENOUS at 08:07

## 2021-08-12 RX ADMIN — MEPERIDINE HYDROCHLORIDE 25 MG: 25 INJECTION, SOLUTION INTRAMUSCULAR; INTRAVENOUS; SUBCUTANEOUS at 20:49

## 2021-08-12 RX ADMIN — DOCUSATE SODIUM 100 MG: 100 CAPSULE ORAL at 20:41

## 2021-08-12 RX ADMIN — SODIUM CHLORIDE, POTASSIUM CHLORIDE, SODIUM LACTATE AND CALCIUM CHLORIDE: 600; 310; 30; 20 INJECTION, SOLUTION INTRAVENOUS at 07:36

## 2021-08-12 RX ADMIN — Medication 100 MCG: at 07:44

## 2021-08-12 RX ADMIN — SODIUM CITRATE AND CITRIC ACID MONOHYDRATE 30 ML: 500; 334 SOLUTION ORAL at 07:09

## 2021-08-12 RX ADMIN — Medication 100 MCG: at 07:42

## 2021-08-12 RX ADMIN — PROPOFOL 200 MG: 10 INJECTION, EMULSION INTRAVENOUS at 07:36

## 2021-08-12 RX ADMIN — Medication 100 MCG: at 07:54

## 2021-08-12 RX ADMIN — SODIUM CHLORIDE, POTASSIUM CHLORIDE, SODIUM LACTATE AND CALCIUM CHLORIDE: 600; 310; 30; 20 INJECTION, SOLUTION INTRAVENOUS at 07:47

## 2021-08-12 RX ADMIN — ROCURONIUM BROMIDE 5 MG: 10 SOLUTION INTRAVENOUS at 07:56

## 2021-08-12 RX ADMIN — FENTANYL CITRATE 100 MCG: 50 INJECTION, SOLUTION INTRAMUSCULAR; INTRAVENOUS at 08:13

## 2021-08-12 RX ADMIN — Medication 100 MCG: at 07:45

## 2021-08-12 RX ADMIN — ROCURONIUM BROMIDE 10 MG: 10 SOLUTION INTRAVENOUS at 07:46

## 2021-08-12 RX ADMIN — MORPHINE SULFATE 2 MG: 4 INJECTION, SOLUTION INTRAMUSCULAR; INTRAVENOUS at 08:14

## 2021-08-12 RX ADMIN — Medication 160 MG: at 07:36

## 2021-08-12 RX ADMIN — CEFAZOLIN 3000 MG: 10 INJECTION, POWDER, FOR SOLUTION INTRAVENOUS at 07:20

## 2021-08-12 RX ADMIN — FENTANYL CITRATE 100 MCG: 50 INJECTION, SOLUTION INTRAMUSCULAR; INTRAVENOUS at 07:46

## 2021-08-12 RX ADMIN — Medication 100 MCG: at 07:47

## 2021-08-12 RX ADMIN — IBUPROFEN 800 MG: 800 TABLET, FILM COATED ORAL at 22:40

## 2021-08-12 RX ADMIN — Medication 999 ML/HR: at 07:46

## 2021-08-12 RX ADMIN — GLYCOPYRROLATE 0.6 MG: 0.2 INJECTION INTRAMUSCULAR; INTRAVENOUS at 08:07

## 2021-08-12 RX ADMIN — Medication 100 MCG: at 07:48

## 2021-08-12 RX ADMIN — SODIUM CHLORIDE, POTASSIUM CHLORIDE, SODIUM LACTATE AND CALCIUM CHLORIDE: 600; 310; 30; 20 INJECTION, SOLUTION INTRAVENOUS at 08:15

## 2021-08-12 ASSESSMENT — PULMONARY FUNCTION TESTS
PIF_VALUE: 19
PIF_VALUE: 1
PIF_VALUE: 42
PIF_VALUE: 5
PIF_VALUE: 2
PIF_VALUE: 1
PIF_VALUE: 23
PIF_VALUE: 3
PIF_VALUE: 1
PIF_VALUE: 23
PIF_VALUE: 1
PIF_VALUE: 23
PIF_VALUE: 0
PIF_VALUE: 20
PIF_VALUE: 23
PIF_VALUE: 21
PIF_VALUE: 12
PIF_VALUE: 19
PIF_VALUE: 0
PIF_VALUE: 0
PIF_VALUE: 21
PIF_VALUE: 24
PIF_VALUE: 22
PIF_VALUE: 5
PIF_VALUE: 8
PIF_VALUE: 23
PIF_VALUE: 23
PIF_VALUE: 2
PIF_VALUE: 1
PIF_VALUE: 20
PIF_VALUE: 34
PIF_VALUE: 1
PIF_VALUE: 20
PIF_VALUE: 1
PIF_VALUE: 23
PIF_VALUE: 23
PIF_VALUE: 20
PIF_VALUE: 1
PIF_VALUE: 15
PIF_VALUE: 24
PIF_VALUE: 24
PIF_VALUE: 3
PIF_VALUE: 22
PIF_VALUE: 23
PIF_VALUE: 0
PIF_VALUE: 33
PIF_VALUE: 1
PIF_VALUE: 21
PIF_VALUE: 5
PIF_VALUE: 5

## 2021-08-12 ASSESSMENT — PAIN SCALES - GENERAL
PAINLEVEL_OUTOF10: 3
PAINLEVEL_OUTOF10: 3
PAINLEVEL_OUTOF10: 4
PAINLEVEL_OUTOF10: 7
PAINLEVEL_OUTOF10: 8
PAINLEVEL_OUTOF10: 7

## 2021-08-12 ASSESSMENT — LIFESTYLE VARIABLES: SMOKING_STATUS: 0

## 2021-08-12 NOTE — PROGRESS NOTES
Pt sat up and dangled legs on the side of the bed. Pericare performed by nurse. Pt returned to bed. Tolerated well. Offered to remove catheter, but pt would like to keep it a little longer, as she has no visitor in room at present to help with twins.

## 2021-08-12 NOTE — ANESTHESIA PRE PROCEDURE
Department of Anesthesiology  Preprocedure Note       Name:  Beth Pimentel   Age:  31 y.o.  :  1995                                          MRN:  [id number]         Date:  2021      Surgeon: Deidre Mcdaniel): Zak Rhodes MD    Procedure: Procedure(s):   SECTION    Medications prior to admission:   Prior to Admission medications    Medication Sig Start Date End Date Taking?  Authorizing Provider   enoxaparin (LOVENOX) 40 MG/0.4ML injection Inject 0.4 mLs into the skin daily 21  Yes Zak Rhodes MD   ferrous sulfate (IRON 325) 325 (65 Fe) MG tablet Take 1 tablet by mouth 2 times daily 21  Yes Zak Rhodes MD   Prenatal Vit-Fe Fumarate-FA (PRENATAL PLUS) 27-1 MG TABS Take 1 tablet by mouth daily 3/31/21  Yes Zak Rhodes MD   aspirin (ASPIRIN CHILDRENS) 81 MG chewable tablet Take 1 tablet by mouth daily 3/30/21  Yes Tamanna Sanford MD   calcium carbonate 600 MG TABS tablet Take 1 tablet by mouth daily   Yes Historical Provider, MD   [place]-3 1000 MG CAPS Take by mouth   Yes Historical Provider, MD       Current medications:    Current Facility-Administered Medications   Medication Dose Route Frequency Provider Last Rate Last Admin    lactated ringers infusion   Intravenous Continuous Zak Rhodes MD        lactated ringers bolus  1,000 mL Intravenous Once Zak Rhodes MD        sodium chloride flush 0.9 % injection 10 mL  10 mL Intravenous 2 times per day Zak Rhodes MD        sodium chloride flush 0.9 % injection 10 mL  10 mL Intravenous PRN Zak Rhodes MD        0.9 % sodium chloride infusion  25 mL Intravenous PRN Zak Rhodes MD        citric acid-sodium citrate (BICITRA) solution 30 mL  30 mL Oral Once Zak Rhodes MD        ceFAZolin (ANCEF) 3,000 mg in dextrose 5 % 100 mL IVPB  3,000 mg Intravenous Once Zak Rhodes MD        oxytocin (PITOCIN) 10 unit bolus from the bag  10 Units Intravenous PRN Zak Rhodes MD And    oxytocin (PITOCIN) 30 units in 500 mL infusion  87.3 nan-units/min Intravenous Continuous PRN Ana Luisa Manzo MD        citric acid-sodium citrate (BICITRA) 500-334 MG/5ML solution             oxytocin (PITOCIN) 30 units in 500 mL infusion Override Pull                Allergies:  No Known Allergies    Problem List:    Patient Active Problem List   Diagnosis Code    Obesity complicating pregnancy W69.367    Coagulation defects in pregnancy, childbirth, puerperium, antepartum ([facility] [address] O99.119, D68.9    Hereditary disease in family possibly affecting pregnancy, antepartum O35. 2XX0    17 weeks gestation of pregnancy Z3A.17    20 weeks gestation of pregnancy Z3A.20    29 weeks gestation of pregnancy Z3A.29     contractions O47.9    32 weeks gestation of pregnancy Z3A.32    33 weeks gestation of pregnancy Z3A.33    33 weeks gestation of pregnancy Z3A.33    34 weeks gestation of pregnancy Z3A.34    Normal pregnancy, third trimester Z34.93       Past Medical History:        Diagnosis Date    Thrombophilia [facility])        Past Surgical History:  History reviewed. No pertinent surgical history. Social History:    Social History     Tobacco Use    Smoking status: Never Smoker    Smokeless tobacco: Never Used   Substance Use Topics    Alcohol use:  No                                Counseling given: Not Answered      Vital Signs (Current):   Vitals:    21 0624 21 0630   BP:  119/70   Pulse:  96   Resp:  18   Temp:  36.8 °C (98.3 °F)   TempSrc:  Oral   Weight: 268 lb (121.6 kg)    Height: 5' 4\" (1.626 m)                                               BP Readings from Last 3 Encounters:   21 119/70   08/10/21 115/62   21 122/77       NPO Status: Time of last liquid consumption: 0300                        Time of last solid consumption: 0000                        Date of last liquid consumption: 21                        Date of last solid food consumption: thrombophilia. Took Lovenox 40 mg at 0300 today. Abdominal:             Vascular: negative vascular ROS. Other Findings:             Anesthesia Plan      general     ASA 2     (Discussed labor options with patient and spouse. Due to Lovenox injection will proceed with GA. Patient agrees.)      MIPS: Postoperative opioids intended and Prophylactic antiemetics administered. Anesthetic plan and risks discussed with patient and spouse. Plan discussed with attending and CRNA. CBC   Lab Results   Component Value Date    WBC 9.2 2021    RBC 3.36 2021    HGB 9.6 2021    HCT 29.9 2021    MCV 89.0 2021    RDW 14.6 2021     2021     CMP  No results found for: NA, K, CL, CO2, BUN, CREATININE, GFRAA, AGRATIO, LABGLOM, GLUCOSE, PROT, CALCIUM, BILITOT, ALKPHOS, AST, ALT  BMP  No results found for: NA, K, CL, CO2, BUN, CREATININE, CALCIUM, GFRAA, LABGLOM, GLUCOSE  POCGlucose  No results for input(s): GLUCOSE in the last 72 hours. Coags  Lab Results   Component Value Date    PROTIME 10.3 2017    INR 0.9 2017    APTT 24.5 2017       HCG (If Applicable)   Lab Results   Component Value Date    PREGTESTUR POSITIVE 2021        ABGs   No results found for: PHART, PO2ART, QSW4TPO, EBM5VHP, BEART, M5TBIORB     Type & Screen (If Applicable)  Lab Results   Component Value Date    ABORH A POS 2017     Active Problem List with ICD10 Codes  Patient Active Problem List   Diagnosis Code    Obesity complicating pregnancy L37.239    Coagulation defects in pregnancy, childbirth, puerperium, antepartum ([facility] [address] O99.119, D68.9    Hereditary disease in family possibly affecting pregnancy, antepartum O35. 2XX0    17 weeks gestation of pregnancy Z3A.17    20 weeks gestation of pregnancy Z3A.20    29 weeks gestation of pregnancy Z3A.29     contractions O47.9    32 weeks gestation of pregnancy Z3A.32    33 weeks gestation of pregnancy Z3A.33    33 weeks gestation of pregnancy Z3A.33    34 weeks gestation of pregnancy Z3A.34    Normal pregnancy, third trimester Z30.80    Dichorionic diamniotic twin pregnancy in third trimester O30.043       [address] [place] [address] APRREGULO  CRNA  August 12, 2021  7:05 AM      [address] [place] [address]   8/12/2021

## 2021-08-12 NOTE — PROCEDURES
Jocelyn Fournier is a 31 y.o. female patient. No diagnosis found. Past Medical History:   Diagnosis Date    Thrombophilia ([facility])      Blood pressure (!) 96/52, pulse 90, temperature 98.6 °F (37 °C), temperature source Oral, resp. rate (P) 14, height 5' 4\" (1.626 m), weight 268 lb (121.6 kg), last menstrual period 2020, SpO2 96 %, unknown if currently breastfeeding. Procedures  PREOPERATIVE DIAGNOSES:     1. 36w4d intrauterine pregnancy. 2.  Twins in labor, breech transverse      POSTOPERATIVE DIAGNOSES:     Same.      PROCEDURE: primary low transverse  section        SURGEON: Selena Cota MD       ASSISTANT:       ESTIMATED BLOOD LOSS:  952LH        COMPLICATIONS:  None.         ANESTHESIA:   General Endotracheal Anesthesia        FINDINGS:  Austin Look  Sex:  Female×2  Fetal Position:  Breechbaby A  Transverse converted to vertex. Baby B  Apgars:  1 minute: 8; 5 minute: 9 baby A  1 minutes 7. 5 minutes, 8. Baby B  Weight:  Pending on both babies   Tubes, uterus, ovaries:  normal          DETAILS OF PROCEDURE:    The patient was taken to the operating room where General Endotracheal Anesthesia was administered and found to be adequate. Abdomen was prepped   and draped in the normal sterile fashion. Urban catheter had previously been   placed. Pfannenstiel skin incision made with a scalpel, carried down to the fascia with cautery. The fascia was nicked in the midline and extended laterally with   cautery. Muscles were  in the midline. Peritoneum was grasped with   hemostats, tented up, and entered sharply. Peritoneal incision was extended   superiorly and inferiorly with good visualization of bladder. Delee bladder blade was introduced. Bladder flap was created with sharp dissection. Low transverse uterine incision was made with a scalpel and extended bluntly. Membranes ruptured for Clear fluid.  A viable female infant ×2was delivered in the breech  presentation without diffiuclty 4.  Baby A.  Baby B was in transverse position converted to vertex and delivered without difficulty. Babies were  bulb suctioned on the abdomen. Cord was clamped and cut, and handed to waiting R.N. Cord blood was obtained. Placenta was manually extracted with 3 vessel cord. Uterus was exteriorized, cleared of all clot and debris. Uterine incision   was closed with vicryl in a running locked fashion. Good hemostasis was   noted. Uterus, tubes, and ovaries appeared normal. Uterus was returned to   the pelvis. The pelvis was irrigated, cleared of all clot and debris. Fascia was closed with 0 stratophyx  in a running fashion. Subcutaneous tissue was irrigated, made hemostatic. Skin was closed with absorbable subcutaneous staples. Baby and mom to recovery room in stable condition. Placenta to pathology: Yes.     MD El Baumann MD  8/12/2021

## 2021-08-12 NOTE — PROGRESS NOTES
Pt is a  that presents to l&d for possible ROM and ctx. Pt states she felt a gush of fluid approximately 0500. Post gush, she began feeling ctx increasing in frequency and intensity. Pt is scheduled for a c/s 21 due to twin pregnancy. Pt states +FM.  EFM applied

## 2021-08-12 NOTE — LACTATION NOTE
Assisted pt and baby B with latch at pt request. Pt was doing all things correctly with the exception of sweeping the lip down and helping keep wide gape when presenting nipple.

## 2021-08-12 NOTE — PROGRESS NOTES
Primary LTCS of viable twin baby girls at 1. APGARS Baby A 8/9. APGARS Baby B 7/8. See NICU worksheet.

## 2021-08-12 NOTE — PROGRESS NOTES
Admitted to room 304 from L&D via cart with infant and accompanied by RN from L&D. Oriented to call light at bedside, RN cell number, Doctor's orders, infant safety and security, pamphlets at bedside, visitation policy of one overnight visitor over the age of 25 who is not interchangeable, and ordering meals. IV fluids infusing at 125cc/hr. Urban draining clear yellow urine.

## 2021-08-12 NOTE — LACTATION NOTE
Pt skin to skin with baby A that just completed 10-15 min BF, baby B with meconium diaper, changed and to right breast, skin to skin and latched using nipple to nose technique, feeding well maintained for 10 minutes. Pt has larger nipples and babies need wide gape to take in. Mother is oozing colostrum and has experience breast feeding her first baby for 14 months. She wishes to be exclusive with these babies and will need added support with position and latch. Encouraged her to tandem feed as much as possible. Encouraged skin to skin and frequent attempts at breast to stimulate milk production. Instructed on normal infant behavior in the first 12-24 hours and importance of stimulating the babies frequently to eat during this time. Reviewed hand expression, and encouraged to hand express drops of colostrum when babies are sleepy. Instructed that babies may also feed 8-12 times a day- cluster feeding at times- as her milk supply is being established. Instructed on benefits of skin to skin and avoidance of pacifier / artificial nipple use until breastfeeding is well established. Educated on making sure infants have an open airway while breastfeeding and skin to skin. Instructed on hunger cues and waking techniques to try. Reviewed signs of adequate I & O; allow babies to feed ad rocio and not to limit time at breast. Information given regarding health benefits of colostrum and exclusive breastfeeding. Encouraged to call with any concerns.

## 2021-08-12 NOTE — H&P
Department of Obstetrics and Gynecology  Nurse Practitioner Obstetrics History and Physical        CHIEF COMPLAINT:  Contractions    HISTORY OF PRESENT ILLNESS:    The patient is a 31 y.o. female , Patient's last menstrual period was 2020 (exact date). ,  at 36w4d. Pt presents to l&d to with complaints of ctx and srom at 0500. Hx of excessive fetal growth, both babies about 99th%. Family hx of skeletal dysplasia. S/p celestone and magnesium sulfate. Hx of thrombophilia last dose of lovenonx at 0300. Twin dichorionic-diamniotic twins. Pt denies vb or decreased fm. OB History        2    Para   1    Term   1            AB        Living   1       SAB        TAB        Ectopic        Molar        Multiple        Live Births   1                Estimated Due Date: Estimated Date of Delivery: 21    PRENATAL CARE:  Stated above    Complicated by:   Patient Active Problem List   Diagnosis Code    Obesity complicating pregnancy J39.167    Coagulation defects in pregnancy, childbirth, puerperium, [facility]partum [facility] O99.119, D68.9    Hereditary disease in family possibly affecting pregnancy, antepartum O35. 2XX0    17 weeks gestation of pregnancy Z3A.17    20 weeks gestation of pregnancy Z3A.20    29 weeks gestation of pregnancy Z3A.29     contractions O47.9    32 weeks gestation of pregnancy Z3A.32    33 weeks gestation of pregnancy Z3A.33    33 weeks gestation of pregnancy Z3A.33    34 weeks gestation of pregnancy Z3A.34       PAST OB HISTORY  OB History        2    Para   1    Term   1            AB        Living   1       SAB        TAB        Ectopic        Molar        Multiple        Live Births   1                Past Medical History:        Diagnosis Date    Thrombophilia [facility])      Past Surgical History:    History reviewed. No pertinent surgical history. Social History:    TOBACCO:   reports that she has never smoked.  She has never used smokeless 11/23/2020 (Exact Date)   BMI 46.00 kg/m²                 Prenatal Labs  Blood Type/Rh: A pos  Antibody Screen: negative  Rubella: immune  T.  Pallidum, IgG: non-reactive   Hepatitis B Surface Antigen: non-reactive   HIV: non-reactive   Sickle Cell Screen: not available  Gonorrhea: negative  Chlamydia: negative  Group B Strep: positive        ASSESSMENT AND PLAN:  D/w Dr. Cathy Nunez  Proceed with c/s  Last dose of lovenox at 0300-  Routine orders        Electronically signed by KASIE Cloud CNP on 8/12/2021 at 6:50 AM

## 2021-08-12 NOTE — PROGRESS NOTES
Universal  Hearing screening results were discussed with parent. Questions answered. Brochure given to parent. Advised to monitor developmental milestones and contact physician for any concerns.    Bong Phillips

## 2021-08-13 LAB
HCT VFR BLD CALC: 21.7 % (ref 34–48)
HEMOGLOBIN: 7 G/DL (ref 11.5–15.5)
MCH RBC QN AUTO: 29.2 PG (ref 26–35)
MCHC RBC AUTO-ENTMCNC: 32.3 % (ref 32–34.5)
MCV RBC AUTO: 90.4 FL (ref 80–99.9)
PDW BLD-RTO: 15.5 FL (ref 11.5–15)
PLATELET # BLD: 189 E9/L (ref 130–450)
PMV BLD AUTO: 10.9 FL (ref 7–12)
RBC # BLD: 2.4 E12/L (ref 3.5–5.5)
WBC # BLD: 11.3 E9/L (ref 4.5–11.5)

## 2021-08-13 PROCEDURE — 6370000000 HC RX 637 (ALT 250 FOR IP): Performed by: OBSTETRICS & GYNECOLOGY

## 2021-08-13 PROCEDURE — 6360000002 HC RX W HCPCS: Performed by: OBSTETRICS & GYNECOLOGY

## 2021-08-13 PROCEDURE — 85027 COMPLETE CBC AUTOMATED: CPT

## 2021-08-13 PROCEDURE — 2580000003 HC RX 258: Performed by: OBSTETRICS & GYNECOLOGY

## 2021-08-13 PROCEDURE — 36415 COLL VENOUS BLD VENIPUNCTURE: CPT

## 2021-08-13 PROCEDURE — 1220000000 HC SEMI PRIVATE OB R&B

## 2021-08-13 RX ORDER — SIMETHICONE 80 MG
80 TABLET,CHEWABLE ORAL 4 TIMES DAILY
Status: DISCONTINUED | OUTPATIENT
Start: 2021-08-13 | End: 2021-08-14 | Stop reason: HOSPADM

## 2021-08-13 RX ORDER — FERROUS SULFATE 325(65) MG
325 TABLET ORAL 2 TIMES DAILY WITH MEALS
Status: DISCONTINUED | OUTPATIENT
Start: 2021-08-13 | End: 2021-08-14 | Stop reason: HOSPADM

## 2021-08-13 RX ADMIN — FERROUS SULFATE TAB 325 MG (65 MG ELEMENTAL FE) 325 MG: 325 (65 FE) TAB at 08:40

## 2021-08-13 RX ADMIN — IBUPROFEN 800 MG: 800 TABLET, FILM COATED ORAL at 15:50

## 2021-08-13 RX ADMIN — SODIUM CHLORIDE, PRESERVATIVE FREE 10 ML: 5 INJECTION INTRAVENOUS at 08:40

## 2021-08-13 RX ADMIN — SODIUM CHLORIDE, PRESERVATIVE FREE 10 ML: 5 INJECTION INTRAVENOUS at 01:53

## 2021-08-13 RX ADMIN — FERROUS SULFATE TAB 325 MG (65 MG ELEMENTAL FE) 325 MG: 325 (65 FE) TAB at 18:10

## 2021-08-13 RX ADMIN — OXYCODONE HYDROCHLORIDE AND ACETAMINOPHEN 2 TABLET: 5; 325 TABLET ORAL at 18:10

## 2021-08-13 RX ADMIN — IBUPROFEN 800 MG: 800 TABLET, FILM COATED ORAL at 08:40

## 2021-08-13 RX ADMIN — DOCUSATE SODIUM 100 MG: 100 CAPSULE ORAL at 08:39

## 2021-08-13 RX ADMIN — SODIUM CHLORIDE, PRESERVATIVE FREE 10 ML: 5 INJECTION INTRAVENOUS at 20:43

## 2021-08-13 RX ADMIN — MEPERIDINE HYDROCHLORIDE 25 MG: 25 INJECTION, SOLUTION INTRAMUSCULAR; INTRAVENOUS; SUBCUTANEOUS at 01:52

## 2021-08-13 RX ADMIN — MEPERIDINE HYDROCHLORIDE 25 MG: 25 INJECTION, SOLUTION INTRAMUSCULAR; INTRAVENOUS; SUBCUTANEOUS at 06:32

## 2021-08-13 RX ADMIN — DOCUSATE SODIUM 100 MG: 100 CAPSULE ORAL at 20:43

## 2021-08-13 RX ADMIN — SIMETHICONE 80 MG: 80 TABLET, CHEWABLE ORAL at 04:45

## 2021-08-13 RX ADMIN — Medication: at 20:51

## 2021-08-13 ASSESSMENT — PAIN SCALES - GENERAL
PAINLEVEL_OUTOF10: 4
PAINLEVEL_OUTOF10: 5
PAINLEVEL_OUTOF10: 1
PAINLEVEL_OUTOF10: 2
PAINLEVEL_OUTOF10: 4

## 2021-08-13 NOTE — PROGRESS NOTES
Morelia Larkin returned phone call, orders received to start patient on fe bid and rpt cbc in am on 8/14/21.

## 2021-08-13 NOTE — PROGRESS NOTES
called med dent answer service to notify on call doctor patient in need of gas pills, OB Norma Nolasco returned phone call at [phone], order received.

## 2021-08-13 NOTE — PROGRESS NOTES
Pt ambulated to restroom. Pericare performed by patient with minimal assist from nurse. Scant amount lochia rubra noted on pad. Pt returned to bed. Tolerated well.

## 2021-08-13 NOTE — PROGRESS NOTES
Pt resting in bed. Assessment as charted. Pt instructed to call with any needs or concerns. Pt voiced understanding.

## 2021-08-13 NOTE — PLAN OF CARE
Problem: Pain:  Goal: Pain level will decrease  Description: Pain level will decrease  Outcome: Met This Shift     Problem: Venous Thromboembolism - Risk of:  Goal: Will show no signs or symptoms of venous thromboembolism  Description: Will show no signs or symptoms of venous thromboembolism  Outcome: Met This Shift     Problem: Fluid Volume - Imbalance:  Goal: Absence of postpartum hemorrhage signs and symptoms  Description: Absence of postpartum hemorrhage signs and symptoms  Outcome: Met This Shift  Goal: Absence of imbalanced fluid volume signs and symptoms  Description: Absence of imbalanced fluid volume signs and symptoms  Outcome: Met This Shift     Problem: Infection - Surgical Site:  Goal: Will show no infection signs and symptoms  Description: Will show no infection signs and symptoms  Outcome: Met This Shift     Problem: Nausea/Vomiting:  Goal: Absence of nausea/vomiting  Description: Absence of nausea/vomiting  Outcome: Met This Shift     Problem: Pain - Acute:  Goal: Pain level will decrease  Description: Pain level will decrease  Outcome: Met This Shift     Problem: Urinary Retention:  Goal: Urinary elimination within specified parameters  Description: Urinary elimination within specified parameters  Outcome: Met This Shift     Problem: Venous Thromboembolism:  Goal: Will show no signs or symptoms of venous thromboembolism  Description: Will show no signs or symptoms of venous thromboembolism  Outcome: Met This Shift  Goal: Absence of signs or symptoms of impaired coagulation  Description: Absence of signs or symptoms of impaired coagulation  Outcome: Met This Shift

## 2021-08-13 NOTE — PROGRESS NOTES
Notified Dr. Espinoza Little of patient on Lovenox during pregnancy due to thrombophilia. Dr. Espinoza Little stated she would discuss with Dr. Maria A Dewitt before ordering.

## 2021-08-13 NOTE — PROGRESS NOTES
Progress Note    SUBJECTIVE: patient status post c section delivery day 1 doing well , normal postpartum course. Accepted level of pain    OBJECTIVE:    VITALS:  /61   Pulse 107   Temp 97.9 °F (36.6 °C)   Resp 16   Ht 5' 4\" (1.626 m)   Wt 268 lb (121.6 kg)   LMP 2020 (Exact Date)   SpO2 94%   Breastfeeding Unknown   BMI 46.00 kg/m²   Physical Exam  lung:cta  Heart : regular rythm  Abdomen:soft  Appropriate tendernessr ,firm uterus ,bs present,incision clear and dry. Extremities :normal no evidence of DVT  DATA:  CBC:   Lab Results   Component Value Date    WBC 11.3 2021    RBC 2.40 2021    HGB 7.0 2021    HCT 21.7 2021    MCV 90.4 2021    MCH 29.2 2021    MCHC 32.3 2021    RDW 15.5 2021     2021    MPV 10.9 2021       ASSESSMENT AND PLAN:  Patient Active Problem List   Diagnosis    Obesity complicating pregnancy    Coagulation defects in pregnancy, childbirth, puerperium, antepartum ([address]    Hereditary disease in family possibly affecting pregnancy, antepartum    17 weeks gestation of pregnancy    20 weeks gestation of pregnancy    29 weeks gestation of pregnancy     contractions    32 weeks gestation of pregnancy    33 weeks gestation of pregnancy    33 weeks gestation of pregnancy    34 weeks gestation of pregnancy    Normal pregnancy, third trimester    Dichorionic diamniotic twin pregnancy in third trimester     delivery, delivered, current hospitalization     Expected drop in her count.   Due to blood loss during surgery and will be started on her for acute blood loss  Normal post partum care   Anticipate discharge home in  24 hours

## 2021-08-13 NOTE — PROGRESS NOTES
Assisted pt to bathroom, tolerated well, vin care preformed, returned to bed, call light within reach.

## 2021-08-13 NOTE — PLAN OF CARE
Problem: Pain:  Goal: Pain level will decrease  Description: Pain level will decrease  8/13/2021 0801 by Catie Virk RN  Outcome: Met This Shift  8/13/2021 0232 by Lili Rogers RN  Outcome: Met This Shift  Goal: Control of acute pain  Description: Control of acute pain  Outcome: Met This Shift  Goal: Control of chronic pain  Description: Control of chronic pain  Outcome: Met This Shift     Problem: Anxiety:  Goal: Level of anxiety will decrease  Description: Level of anxiety will decrease  Outcome: Met This Shift     Problem: Aspiration - Risk of:  Goal: Absence of aspiration  Description: Absence of aspiration  Outcome: Met This Shift     Problem: Tissue Perfusion - Uteroplacental, Altered:  Goal: Absence of abnormal fetal heart rate pattern  Description: Absence of abnormal fetal heart rate pattern  Outcome: Met This Shift     Problem: Venous Thromboembolism - Risk of:  Goal: Will show no signs or symptoms of venous thromboembolism  Description: Will show no signs or symptoms of venous thromboembolism  8/13/2021 0801 by Catie Virk RN  Outcome: Met This Shift  8/13/2021 0232 by Lili Rogers RN  Outcome: Met This Shift     Problem: Fluid Volume - Imbalance:  Goal: Absence of postpartum hemorrhage signs and symptoms  Description: Absence of postpartum hemorrhage signs and symptoms  8/13/2021 0801 by Catie Virk RN  Outcome: Met This Shift  8/13/2021 0232 by Lili Rogers RN  Outcome: Met This Shift  Goal: Absence of imbalanced fluid volume signs and symptoms  Description: Absence of imbalanced fluid volume signs and symptoms  8/13/2021 0801 by Catie Virk RN  Outcome: Met This Shift  8/13/2021 0232 by Lili Rogers RN  Outcome: Met This Shift     Problem: Infection - Surgical Site:  Goal: Will show no infection signs and symptoms  Description: Will show no infection signs and symptoms  8/13/2021 0801 by Catie Virk RN  Outcome: Met This Shift  8/13/2021 0232 by Nadeen Quintero

## 2021-08-13 NOTE — LACTATION NOTE
Patient reports babies are latching well. Nipples are a little tender today. Reviewed importance of deep latch to improve comfort. Declined need for assistance at this time-will call for help at next feeding if needed. Reviewed breastfeeding positions for feeding twins.

## 2021-08-13 NOTE — PLAN OF CARE
Problem: Pain:  Goal: Pain level will decrease  Description: Pain level will decrease  8/13/2021 0801 by Carissa Kapadia RN  Outcome: Met This Shift  8/13/2021 0232 by Promise Wright RN  Outcome: Met This Shift  Goal: Control of acute pain  Description: Control of acute pain  Outcome: Met This Shift  Goal: Control of chronic pain  Description: Control of chronic pain  Outcome: Met This Shift     Problem: Fluid Volume - Imbalance:  Goal: Absence of postpartum hemorrhage signs and symptoms  Description: Absence of postpartum hemorrhage signs and symptoms  8/13/2021 0801 by Carissa Kapadia RN  Outcome: Met This Shift  8/13/2021 0232 by Promise Wright RN  Outcome: Met This Shift  Goal: Absence of imbalanced fluid volume signs and symptoms  Description: Absence of imbalanced fluid volume signs and symptoms  8/13/2021 0801 by Carissa Kapadia RN  Outcome: Met This Shift  8/13/2021 0232 by Promise Wright RN  Outcome: Met This Shift     Problem: Infection - Surgical Site:  Goal: Will show no infection signs and symptoms  Description: Will show no infection signs and symptoms  8/13/2021 0801 by Carissa Kapadia RN  Outcome: Met This Shift  8/13/2021 0232 by Promise Wright RN  Outcome: Met This Shift     Problem: Mood - Altered:  Goal: Mood stable  Description: Mood stable  Outcome: Met This Shift     Problem: Nausea/Vomiting:  Goal: Absence of nausea/vomiting  Description: Absence of nausea/vomiting  8/13/2021 0801 by Carissa Kapadia RN  Outcome: Met This Shift  8/13/2021 0232 by Promise Wright RN  Outcome: Met This Shift     Problem: Pain - Acute:  Goal: Pain level will decrease  Description: Pain level will decrease  8/13/2021 0801 by Carissa Kapadia RN  Outcome: Met This Shift  8/13/2021 0232 by Promise Wright RN  Outcome: Met This Shift     Problem: Urinary Retention:  Goal: Urinary elimination within specified parameters  Description: Urinary elimination within specified parameters  8/13/2021 0801 by Rush Bradford RN  Outcome: Met This Shift  8/13/2021 0232 by Charles Desai RN  Outcome: Met This Shift     Problem: Venous Thromboembolism:  Goal: Absence of signs or symptoms of impaired coagulation  Description: Absence of signs or symptoms of impaired coagulation  8/13/2021 0232 by Charles Desai RN  Outcome: Met This Shift

## 2021-08-14 VITALS
OXYGEN SATURATION: 98 % | RESPIRATION RATE: 18 BRPM | DIASTOLIC BLOOD PRESSURE: 67 MMHG | HEIGHT: 64 IN | WEIGHT: 268 LBS | HEART RATE: 104 BPM | BODY MASS INDEX: 45.75 KG/M2 | TEMPERATURE: 99.4 F | SYSTOLIC BLOOD PRESSURE: 117 MMHG

## 2021-08-14 LAB
HCT VFR BLD CALC: 20.5 % (ref 34–48)
HEMOGLOBIN: 6.5 G/DL (ref 11.5–15.5)
MCH RBC QN AUTO: 29.8 PG (ref 26–35)
MCHC RBC AUTO-ENTMCNC: 31.7 % (ref 32–34.5)
MCV RBC AUTO: 94 FL (ref 80–99.9)
PDW BLD-RTO: 15.8 FL (ref 11.5–15)
PLATELET # BLD: 191 E9/L (ref 130–450)
PMV BLD AUTO: 10 FL (ref 7–12)
RBC # BLD: 2.18 E12/L (ref 3.5–5.5)
WBC # BLD: 8.7 E9/L (ref 4.5–11.5)

## 2021-08-14 PROCEDURE — 90471 IMMUNIZATION ADMIN: CPT | Performed by: OBSTETRICS & GYNECOLOGY

## 2021-08-14 PROCEDURE — 85027 COMPLETE CBC AUTOMATED: CPT

## 2021-08-14 PROCEDURE — 6370000000 HC RX 637 (ALT 250 FOR IP): Performed by: OBSTETRICS & GYNECOLOGY

## 2021-08-14 PROCEDURE — 90715 TDAP VACCINE 7 YRS/> IM: CPT | Performed by: OBSTETRICS & GYNECOLOGY

## 2021-08-14 PROCEDURE — 6360000002 HC RX W HCPCS: Performed by: OBSTETRICS & GYNECOLOGY

## 2021-08-14 PROCEDURE — 2580000003 HC RX 258: Performed by: OBSTETRICS & GYNECOLOGY

## 2021-08-14 PROCEDURE — 36415 COLL VENOUS BLD VENIPUNCTURE: CPT

## 2021-08-14 RX ORDER — HYDROCODONE BITARTRATE AND ACETAMINOPHEN 5; 325 MG/1; MG/1
1 TABLET ORAL EVERY 6 HOURS PRN
Qty: 12 TABLET | Refills: 0 | Status: SHIPPED | OUTPATIENT
Start: 2021-08-14 | End: 2021-08-21

## 2021-08-14 RX ADMIN — IBUPROFEN 800 MG: 800 TABLET, FILM COATED ORAL at 08:40

## 2021-08-14 RX ADMIN — DOCUSATE SODIUM 100 MG: 100 CAPSULE ORAL at 08:40

## 2021-08-14 RX ADMIN — FERROUS SULFATE TAB 325 MG (65 MG ELEMENTAL FE) 325 MG: 325 (65 FE) TAB at 08:40

## 2021-08-14 RX ADMIN — TETANUS TOXOID, REDUCED DIPHTHERIA TOXOID AND ACELLULAR PERTUSSIS VACCINE, ADSORBED 0.5 ML: 5; 2.5; 8; 8; 2.5 SUSPENSION INTRAMUSCULAR at 11:26

## 2021-08-14 RX ADMIN — IBUPROFEN 800 MG: 800 TABLET, FILM COATED ORAL at 00:18

## 2021-08-14 RX ADMIN — SIMETHICONE 80 MG: 80 TABLET, CHEWABLE ORAL at 08:40

## 2021-08-14 RX ADMIN — SODIUM CHLORIDE, PRESERVATIVE FREE 10 ML: 5 INJECTION INTRAVENOUS at 08:39

## 2021-08-14 ASSESSMENT — PAIN SCALES - GENERAL
PAINLEVEL_OUTOF10: 4
PAINLEVEL_OUTOF10: 3

## 2021-08-14 NOTE — FLOWSHEET NOTE
Dr Rich Mention notified of hg 6.5 and patient asymptomatic and hoping for discharge today.  Will see this am.No orders at present time

## 2021-08-14 NOTE — PROGRESS NOTES
Progress Note    SUBJECTIVE: patient status post c section delivery day 2 doing well , normal postpartum course. Accepted level of pain    OBJECTIVE:    VITALS:  /67   Pulse 104   Temp 99.4 °F (37.4 °C) (Oral)   Resp 18   Ht 5' 4\" (1.626 m)   Wt 268 lb (121.6 kg)   LMP 2020 (Exact Date)   SpO2 98%   Breastfeeding Unknown   BMI 46.00 kg/m²   Physical Exam  lung:cta  Heart : regular rythm  Abdomen:soft  Appropriate tendernessr ,firm uterus ,bs present,incision clear and dry.   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, childbirth, puerperium, antepartum ([facility] [address]    Hereditary disease in family possibly affecting pregnancy, antepartum    17 weeks gestation of pregnancy    20 weeks gestation of pregnancy    29 weeks gestation of pregnancy     contractions    32 weeks gestation of pregnancy    33 weeks gestation of pregnancy    33 weeks gestation of pregnancy    34 weeks gestation of pregnancy    Normal pregnancy, third trimester    Dichorionic diamniotic twin pregnancy in third trimester     delivery, delivered, current hospitalization       Normal post partum care   Anticipate discharge home

## 2021-08-14 NOTE — LACTATION NOTE
Mom concerned that baby A is not feeding as well as baby B. Observed baby at the breast and she fed for 10 minutes with a deep latch. Lots of colostrum visible. Set up electric breast pump per mom's request.  Demonstrated use and care with dad. Encouraged them to call with questions.   Danna, [facility]

## 2021-08-14 NOTE — PROGRESS NOTES
Discharge teaching & instructions for mother & babies explained to patient who verbalized understanding of home care , meds , activity , & follow up with doctor

## 2021-08-25 NOTE — DISCHARGE SUMMARY
Obstetric Discharge Summary    Admitting Diagnosis  IUP twins at 36+ weeks   OB History        2    Para   2    Term   1       1    AB        Living   3       SAB        TAB        Ectopic        Molar        Multiple   1    Live Births   3                Reasons for Admission on 2021  6:09 AM  Normal pregnancy, third trimester [Z34.93]  No comment available  Onset of Labor   Section (Primary)    Prenatal Procedures  Ultrasound #     Intrapartum Procedures        Multiple birth?: Yes         Delivery: : Low Cervical, Transverse       Postpartum Procedures  None    Postpartum/Operative Complications       [place] Data  Information for the patient's :  Wilmichaelminia Kind Girl Adina Huerta [[id number]]   female   Birth Weight: 7 lb 7.6 oz (3.391 kg)   Information for the patient's :  Antelmominia Kind Girl Jonathan Bolanos [[id number]]   female   Birth Weight: 8 lb 7.8 oz (3.85 kg)     Discharge With Mother  Complications: No    Discharge Diagnosis       Discharge Information  Discharge Medication List as of 2021 11:38 AM      START taking these medications    Details   HYDROcodone-acetaminophen (NORCO) 5-325 MG per tablet Take 1 tablet by mouth every 6 hours as needed for Pain for up to 7 days. Intended supply: 3 days.  Take lowest dose possible to manage pain, Disp-12 tablet, R-0Normal         CONTINUE these medications which have NOT CHANGED    Details   enoxaparin (LOVENOX) 40 MG/0.4ML injection Inject 0.4 mLs into the skin daily, Disp-12 mL, R-2Normal      ferrous sulfate (IRON 325) 325 (65 Fe) MG tablet Take 1 tablet by mouth 2 times daily, Disp-60 tablet, R-5Normal      Prenatal Vit-Fe Fumarate-FA (PRENATAL PLUS) 27-1 MG TABS Take 1 tablet by mouth daily, Disp-30 tablet, R-11PLEASE FILL A PRENATAL VITAMIN THAT IS COVERED BY PATIENTS INSURANCENormal      aspirin (ASPIRIN CHILDRENS) 81 MG chewable tablet Take 1 tablet by mouth daily, Disp-30 tablet, R-3Normal      calcium carbonate 600 MG TABS tablet Take 1 tablet by mouth dailyHistorical Med      Omega-3 1000 MG CAPS Take by mouthHistorical Med             No discharge procedures on file.     Discharge to: Home  Follow up in 2 weeks      Comments

## 2022-08-26 ENCOUNTER — HOSPITAL ENCOUNTER (OUTPATIENT)
Dept: HOSPITAL 83 - LAB | Age: 27
Discharge: HOME | End: 2022-08-26
Attending: STUDENT IN AN ORGANIZED HEALTH CARE EDUCATION/TRAINING PROGRAM
Payer: COMMERCIAL

## 2022-08-26 DIAGNOSIS — E03.9: Primary | ICD-10-CM

## 2024-04-29 ENCOUNTER — HOSPITAL ENCOUNTER (EMERGENCY)
Dept: HOSPITAL 83 - ED | Age: 29
Discharge: HOME | End: 2024-04-29
Payer: COMMERCIAL

## 2024-04-29 VITALS — HEIGHT: 63.98 IN | WEIGHT: 260 LBS | BODY MASS INDEX: 44.39 KG/M2

## 2024-04-29 DIAGNOSIS — M54.2: ICD-10-CM

## 2024-04-29 DIAGNOSIS — G43.909: Primary | ICD-10-CM

## 2024-04-29 DIAGNOSIS — H92.02: ICD-10-CM

## 2024-04-29 LAB
ALP SERPL-CCNC: 87 U/L (ref 46–116)
ALT SERPL W P-5'-P-CCNC: 59 U/L (ref 5–49)
BASOPHILS # BLD AUTO: 0 10*3/UL (ref 0–0.1)
BASOPHILS NFR BLD AUTO: 0.5 % (ref 0–1)
BUN SERPL-MCNC: 10 MG/DL (ref 9–23)
CHLORIDE SERPL-SCNC: 106 MMOL/L (ref 98–107)
EOSINOPHIL # BLD AUTO: 0.1 10*3/UL (ref 0–0.4)
EOSINOPHIL # BLD AUTO: 1.3 % (ref 1–4)
ERYTHROCYTE [DISTWIDTH] IN BLOOD BY AUTOMATED COUNT: 13 % (ref 0–14.5)
HCT VFR BLD AUTO: 39.3 % (ref 37–47)
LYMPHOCYTES # BLD AUTO: 1.9 10*3/UL (ref 1.3–4.4)
LYMPHOCYTES NFR BLD AUTO: 22.8 % (ref 27–41)
MCH RBC QN AUTO: 27.7 PG (ref 27–31)
MCHC RBC AUTO-ENTMCNC: 31.8 G/DL (ref 33–37)
MCV RBC AUTO: 87.1 FL (ref 81–99)
MONOCYTES # BLD AUTO: 0.7 10*3/UL (ref 0.1–1)
MONOCYTES NFR BLD MANUAL: 9 % (ref 3–9)
NEUT #: 5.4 10*3/UL (ref 2.3–7.9)
NEUT %: 66.2 % (ref 47–73)
NRBC BLD QL AUTO: 0 10*3/UL (ref 0–0)
PLATELET # BLD AUTO: 313 10*3/UL (ref 130–400)
PMV BLD AUTO: 9.6 FL (ref 9.6–12.3)
POTASSIUM SERPL-SCNC: 3.5 MMOL/L (ref 3.4–5.1)
PROT SERPL-MCNC: 7.4 GM/DL (ref 6–8)
RBC # BLD AUTO: 4.51 10*6/UL (ref 4.1–5.1)
WBC NRBC COR # BLD AUTO: 8.2 10*3/UL (ref 4.8–10.8)

## 2025-03-22 ENCOUNTER — HOSPITAL ENCOUNTER (OUTPATIENT)
Dept: HOSPITAL 83 - LAB | Age: 30
Discharge: HOME | End: 2025-03-22
Payer: COMMERCIAL

## 2025-03-22 DIAGNOSIS — E03.9: Primary | ICD-10-CM

## 2025-03-22 LAB
ALP SERPL-CCNC: 74 U/L (ref 46–116)
ALT SERPL W P-5'-P-CCNC: 14 U/L (ref 5–49)
BUN SERPL-MCNC: 9 MG/DL (ref 9–23)
CHLORIDE SERPL-SCNC: 104 MMOL/L (ref 98–107)
CHOLEST SERPL-MCNC: 204 MG/DL (ref ?–200)
ERYTHROCYTE [DISTWIDTH] IN BLOOD BY AUTOMATED COUNT: 12.8 % (ref 0–14.5)
HCT VFR BLD AUTO: 40.3 % (ref 37–47)
LDLC SERPL DIRECT ASSAY-MCNC: 118 MG/DL (ref 9–159)
MCH RBC QN AUTO: 28 PG (ref 27–31)
MCHC RBC AUTO-ENTMCNC: 32.3 G/DL (ref 33–37)
MCV RBC AUTO: 86.7 FL (ref 81–99)
NRBC BLD QL AUTO: 0 10*3/UL (ref 0–0)
PLATELET # BLD AUTO: 355 10*3/UL (ref 130–400)
PMV BLD AUTO: 9.5 FL (ref 9.6–12.3)
POTASSIUM SERPL-SCNC: 4.5 MMOL/L (ref 3.4–5.1)
PROT SERPL-MCNC: 7.5 GM/DL (ref 6–8)
RBC # BLD AUTO: 4.65 10*6/UL (ref 4.1–5.1)
T4 FREE SERPL-MCNC: 1.21 NG/DL (ref 0.89–1.76)
TRIGL SERPL-MCNC: 151 MG/DL (ref ?–150)
WBC NRBC COR # BLD AUTO: 6.5 10*3/UL (ref 4.8–10.8)

## 2025-06-09 ENCOUNTER — HOSPITAL ENCOUNTER (EMERGENCY)
Dept: HOSPITAL 83 - ED | Age: 30
LOS: 1 days | Discharge: HOME | End: 2025-06-10
Payer: COMMERCIAL

## 2025-06-09 VITALS — WEIGHT: 260 LBS | BODY MASS INDEX: 44.39 KG/M2 | HEIGHT: 63.98 IN

## 2025-06-09 DIAGNOSIS — Z79.899: ICD-10-CM

## 2025-06-09 DIAGNOSIS — G43.909: Primary | ICD-10-CM

## (undated) DEVICE — SUTURE STRATAFIX SPRL SZ 1 L14IN ABSRB VLT L48CM CTX 1/2 SXPD2B405

## (undated) DEVICE — EXTRA LARGE, DISPOSABLE C-SECTION PROTECTOR - RETRACTOR: Brand: ALEXIS ® O C-SECTION PROTECTOR - RETRACTOR

## (undated) DEVICE — ELECTRODE PT RET AD L9FT HI MOIST COND ADH HYDRGEL CORDED

## (undated) DEVICE — TOWEL,OR,DSP,ST,BLUE,STD,6/PK,12PK/CS: Brand: MEDLINE

## (undated) DEVICE — GLOVE SURG SZ 75 L12IN FNGR THK83MIL CRM POLYISOPRENE

## (undated) DEVICE — 3M™ STERI-STRIP™ COMPOUND BENZOIN TINCTURE 40 BAGS/CARTON 4 CARTONS/CASE C1544: Brand: 3M™ STERI-STRIP™

## (undated) DEVICE — CONTAINER,SPEC,PNEUM TUBE,3OZ,STRL PATH: Brand: MEDLINE

## (undated) DEVICE — 3000CC GUARDIAN II: Brand: GUARDIAN

## (undated) DEVICE — SHEET,DRAPE,53X77,STERILE: Brand: MEDLINE

## (undated) DEVICE — BLADE SURG NO20 S STL STR DISP GLASSVAN

## (undated) DEVICE — GOWN,SIRUS,FABRNF,L,20/CS: Brand: MEDLINE

## (undated) DEVICE — PENCIL ES L3M BTTN SWCH HOLSTER W/ BLDE ELECTRD EDGE

## (undated) DEVICE — COUNTER NDL 30 COUNT DBL MAG

## (undated) DEVICE — CESAREAN BIRTH PACK II: Brand: MEDLINE INDUSTRIES, INC.

## (undated) DEVICE — HYPODERMIC SAFETY NEEDLE: Brand: MAGELLAN

## (undated) DEVICE — STRIP,CLOSURE,WOUND,MEDI-STRIP,1/2X4: Brand: MEDLINE

## (undated) DEVICE — COVER,LIGHT HANDLE,FLX,2/PK: Brand: MEDLINE INDUSTRIES, INC.

## (undated) DEVICE — GLOVE SURG SZ 65 L12IN FNGR THK83MIL CRM POLYISOPRENE

## (undated) DEVICE — CONTAINER SPEC 64OZ POLYPR PATH SNAP LOK CAP W/ LID

## (undated) DEVICE — STAPLER SKIN SQ 30 ABSRB STPL DISP INSORB

## (undated) DEVICE — SUTURE CHROMIC GUT SZ 1 L36IN ABSRB BRN L48MM CTX 1/2 CIR 905H

## (undated) DEVICE — MEDI-VAC YANKAUER SUCTION HANDLE W/BULBOUS TIP: Brand: CARDINAL HEALTH

## (undated) DEVICE — AGENT HEMSTAT W4XL4IN OXIDIZED REGENERATED CELOS STRUCTURED

## (undated) DEVICE — APPLICATOR PREP 26ML 0.7% IOD POVACRYLEX 74% ISO ALC ST

## (undated) DEVICE — GOWN,SIRUS,POLYRNF,BRTHSLV,XLN/XL,20/CS: Brand: MEDLINE

## (undated) DEVICE — Device: Brand: PORTEX

## (undated) DEVICE — PEN: MARKING STD 100/CS: Brand: MEDICAL ACTION INDUSTRIES

## (undated) DEVICE — TUBING, SUCTION, 3/16" X 12', STRAIGHT: Brand: MEDLINE

## (undated) DEVICE — CATHETERIZATION KIT FOL16 FR 2000 CC DRAINAGE BG LUBRICATH

## (undated) DEVICE — SPONGE LAP W18XL18IN WHT COT 4 PLY FLD STRUNG RADPQ DISP ST

## (undated) DEVICE — TUBE BLD COLLECT ST 1 SIL COAT 7ML 10ML